# Patient Record
Sex: FEMALE | Race: WHITE | NOT HISPANIC OR LATINO | Employment: OTHER | URBAN - NONMETROPOLITAN AREA
[De-identification: names, ages, dates, MRNs, and addresses within clinical notes are randomized per-mention and may not be internally consistent; named-entity substitution may affect disease eponyms.]

---

## 2017-01-25 DIAGNOSIS — R07.2 PRECORDIAL PAIN: Primary | ICD-10-CM

## 2017-01-26 ENCOUNTER — APPOINTMENT (OUTPATIENT)
Dept: LAB | Facility: HOSPITAL | Age: 57
End: 2017-01-26
Attending: INTERNAL MEDICINE

## 2017-01-26 ENCOUNTER — OFFICE VISIT (OUTPATIENT)
Dept: CARDIOLOGY | Facility: CLINIC | Age: 57
End: 2017-01-26

## 2017-01-26 ENCOUNTER — HOSPITAL ENCOUNTER (OUTPATIENT)
Dept: GENERAL RADIOLOGY | Facility: HOSPITAL | Age: 57
Discharge: HOME | End: 2017-01-26
Attending: INTERNAL MEDICINE | Admitting: INTERNAL MEDICINE

## 2017-01-26 VITALS
HEIGHT: 62 IN | OXYGEN SATURATION: 96 % | HEART RATE: 104 BPM | WEIGHT: 158 LBS | DIASTOLIC BLOOD PRESSURE: 80 MMHG | BODY MASS INDEX: 29.08 KG/M2 | SYSTOLIC BLOOD PRESSURE: 120 MMHG

## 2017-01-26 DIAGNOSIS — I10 ESSENTIAL HYPERTENSION: ICD-10-CM

## 2017-01-26 DIAGNOSIS — R07.2 PRECORDIAL PAIN: Primary | ICD-10-CM

## 2017-01-26 DIAGNOSIS — F17.200 SMOKER: ICD-10-CM

## 2017-01-26 DIAGNOSIS — E78.2 MIXED HYPERLIPIDEMIA: ICD-10-CM

## 2017-01-26 DIAGNOSIS — R91.1 PULMONARY NODULE: ICD-10-CM

## 2017-01-26 DIAGNOSIS — R06.09 DYSPNEA ON EXERTION: ICD-10-CM

## 2017-01-26 LAB
ALBUMIN SERPL-MCNC: 5 G/DL (ref 3.4–4.8)
ALBUMIN UR-MCNC: <0.6 MG/L
ALP SERPL-CCNC: 154 U/L (ref 38–126)
ALT SERPL W P-5'-P-CCNC: 40 U/L (ref 9–52)
ARTICHOKE IGE QN: 83 MG/DL (ref 1–129)
AST SERPL-CCNC: 30 U/L (ref 14–36)
BILIRUB CONJ SERPL-MCNC: 0 MG/DL (ref 0–0.3)
BILIRUB INDIRECT SERPL-MCNC: 0 MG/DL (ref 0–1.1)
BILIRUB SERPL-MCNC: 0.4 MG/DL (ref 0.2–1.3)
CHOLEST SERPL-MCNC: 150 MG/DL (ref 0–199)
HDLC SERPL-MCNC: 31 MG/DL (ref 60–200)
LDLC/HDLC SERPL: 1.67 {RATIO} (ref 0–3.22)
NT-PROBNP SERPL-MCNC: 17.2 PG/ML (ref 0–900)
PROT SERPL-MCNC: 8.1 G/DL (ref 6.3–8.6)
TRIGL SERPL-MCNC: 336 MG/DL (ref 20–199)

## 2017-01-26 PROCEDURE — 93000 ELECTROCARDIOGRAM COMPLETE: CPT | Performed by: INTERNAL MEDICINE

## 2017-01-26 PROCEDURE — 99203 OFFICE O/P NEW LOW 30 MIN: CPT | Performed by: INTERNAL MEDICINE

## 2017-01-26 PROCEDURE — 80076 HEPATIC FUNCTION PANEL: CPT | Performed by: INTERNAL MEDICINE

## 2017-01-26 PROCEDURE — 71020 HC CHEST PA AND LATERAL: CPT

## 2017-01-26 PROCEDURE — 86141 C-REACTIVE PROTEIN HS: CPT | Performed by: INTERNAL MEDICINE

## 2017-01-26 PROCEDURE — 36415 COLL VENOUS BLD VENIPUNCTURE: CPT | Performed by: INTERNAL MEDICINE

## 2017-01-26 PROCEDURE — 80061 LIPID PANEL: CPT | Performed by: INTERNAL MEDICINE

## 2017-01-26 PROCEDURE — 82043 UR ALBUMIN QUANTITATIVE: CPT | Performed by: INTERNAL MEDICINE

## 2017-01-26 PROCEDURE — 83880 ASSAY OF NATRIURETIC PEPTIDE: CPT | Performed by: INTERNAL MEDICINE

## 2017-01-26 RX ORDER — LIDOCAINE 50 MG/G
1 PATCH TOPICAL EVERY 24 HOURS
COMMUNITY
End: 2017-07-26

## 2017-01-26 NOTE — MR AVS SNAPSHOT
[facility] CARDIOLOGY  [phone]                    Marguerite Rob   1/26/2017 10:00 AM   Office Visit    Dept Phone:  [phone]   Encounter #:  [id number]    Provider:  Lucas Puga MD PhD   Department:  [facility] CARDIOLOGY                Your Full Care Plan              Today's Medication Changes          These changes are accurate as of: 1/26/17 11:00 AM.  If you have any questions, ask your nurse or doctor.               Stop taking medication(s)listed here:     metroNIDAZOLE 500 MG tablet   Commonly known as:  FLAGYL   Stopped by:  Lucas Puga MD PhD                      Your Updated Medication List          This list is accurate as of: 1/26/17 11:00 AM.  Always use your most recent med list.                * albuterol 108 (90 BASE) MCG/ACT inhaler   Commonly known as:  PROVENTIL HFA;VENTOLIN HFA       * albuterol (5 MG/ML) 0.5% nebulizer solution   Commonly known as:  PROVENTIL       amitriptyline 100 MG tablet   Commonly known as:  ELAVIL       amLODIPine 5 MG tablet   Commonly known as:  NORVASC       budesonide-formoterol 160-4.5 MCG/ACT inhaler   Commonly known as:  SYMBICORT       CLARITIN 10 MG tablet   Generic drug:  loratadine       famotidine 40 MG tablet   Commonly known as:  PEPCID       fluconazole 100 MG tablet   Commonly known as:  DIFLUCAN       furosemide 40 MG tablet   Commonly known as:  LASIX       HYDROcodone-acetaminophen  MG per tablet   Commonly known as:  NORCO       JANUMET  MG per tablet   Generic drug:  sitaGLIPtin-metFORMIN       lidocaine 5 %   Commonly known as:  LIDODERM       LOPID 600 MG tablet   Generic drug:  gemfibrozil       pantoprazole 20 MG EC tablet   Commonly known as:  PROTONIX       potassium chloride 20 MEQ CR tablet   Commonly known as:  K-DUR,KLOR-CON       pravastatin 20 MG tablet   Commonly known as:  PRAVACHOL       sennosides-docusate sodium 8.6-50 MG tablet   Commonly known as:   SENOKOT-S       * Notice:  This list has 2 medication(s) that are the same as other medications prescribed for you. Read the directions carefully, and ask your doctor or other care provider to review them with you.            We Performed the Following     Adult Transthoracic Echo Complete     Ambulatory Referral to Pulmonology     BNP     BNP     Hepatic Function Panel     High Sensitivity CRP     Lipid Panel     MicroAlbumin, Urine, Random     Pulmonary Function Test     Stress Test With Myocardial Perfusion One Day     Walking Oximetry     XR Chest 2 View       You Were Diagnosed With        Codes Comments    Precordial pain    -  Primary ICD-10-CM: R07.2  ICD-9-CM: 786.51     Essential hypertension     ICD-10-CM: I10  ICD-9-CM: 401.9     Mixed hyperlipidemia     ICD-10-CM: E78.2  ICD-9-CM: 272.2     Dyspnea on exertion     ICD-10-CM: R06.09  ICD-9-CM: 786.09     Smoker     ICD-10-CM: F17.200  ICD-9-CM: 305.1     Pulmonary nodule     ICD-10-CM: R91.1  ICD-9-CM: 793.11       Instructions    Nonspecific Chest Pain   Chest pain can be caused by many different conditions. There is always a chance that your pain could be related to something serious, such as a heart attack or a blood clot in your lungs. Chest pain can also be caused by conditions that are not life-threatening. If you have chest pain, it is very important to follow up with your health care provider.  CAUSES   Chest pain can be caused by:  · Heartburn.  · Pneumonia or bronchitis.  · Anxiety or stress.  · Inflammation around your heart (pericarditis) or lung (pleuritis or pleurisy).  · A blood clot in your lung.  · A collapsed lung (pneumothorax). It can develop suddenly on its own (spontaneous pneumothorax) or from trauma to the chest.  · Shingles infection (varicella-zoster virus).  · Heart attack.  · Damage to the bones, muscles, and cartilage that make up your chest wall. This can include:    Bruised bones due to injury.    Strained muscles or  cartilage due to frequent or repeated coughing or overwork.    Fracture to one or more ribs.    Sore cartilage due to inflammation (costochondritis).  RISK FACTORS   Risk factors for chest pain may include:  · Activities that increase your risk for trauma or injury to your chest.  · Respiratory infections or conditions that cause frequent coughing.  · Medical conditions or overeating that can cause heartburn.  · Heart disease or family history of heart disease.  · Conditions or health behaviors that increase your risk of developing a blood clot.  · Having had chicken pox (varicella zoster).  SIGNS AND SYMPTOMS  Chest pain can feel like:  · Burning or tingling on the surface of your chest or deep in your chest.  · Crushing, pressure, aching, or squeezing pain.  · Dull or sharp pain that is worse when you move, cough, or take a deep breath.  · Pain that is also felt in your back, neck, shoulder, or arm, or pain that spreads to any of these areas.  Your chest pain may come and go, or it may stay constant.  DIAGNOSIS  Lab tests or other studies may be needed to find the cause of your pain. Your health care provider may have you take a test called an ambulatory ECG (electrocardiogram). An ECG records your heartbeat patterns at the time the test is performed. You may also have other tests, such as:  · Transthoracic echocardiogram (TTE). During echocardiography, sound waves are used to create a picture of all of the heart structures and to look at how blood flows through your heart.  · Transesophageal echocardiogram (RODNEY). This is a more advanced imaging test that obtains images from inside your body. It allows your health care provider to see your heart in finer detail.  · Cardiac monitoring. This allows your health care provider to monitor your heart rate and rhythm in real time.  · Holter monitor. This is a portable device that records your heartbeat and can help to diagnose abnormal heartbeats. It allows your health  care provider to track your heart activity for several days, if needed.  · Stress tests. These can be done through exercise or by taking medicine that makes your heart beat more quickly.  · Blood tests.  · Imaging tests.  TREATMENT   Your treatment depends on what is causing your chest pain. Treatment may include:  · Medicines. These may include:    Acid blockers for heartburn.    Anti-inflammatory medicine.    Pain medicine for inflammatory conditions.    Antibiotic medicine, if an infection is present.    Medicines to dissolve blood clots.    Medicines to treat coronary artery disease.  · Supportive care for conditions that do not require medicines. This may include:    Resting.    Applying heat or cold packs to injured areas.    Limiting activities until pain decreases.  HOME CARE INSTRUCTIONS  · If you were prescribed an antibiotic medicine, finish it all even if you start to feel better.  · Avoid any activities that bring on chest pain.  · Do not use any tobacco products, including cigarettes, chewing tobacco, or electronic cigarettes. If you need help quitting, ask your health care provider.  · Do not drink alcohol.  · Take medicines only as directed by your health care provider.  · Keep all follow-up visits as directed by your health care provider. This is important. This includes any further testing if your chest pain does not go away.  · If heartburn is the cause for your chest pain, you may be told to keep your head raised (elevated) while sleeping. This reduces the chance that acid will go from your stomach into your esophagus.  · Make lifestyle changes as directed by your health care provider. These may include:    Getting regular exercise. Ask your health care provider to suggest some activities that are safe for you.    Eating a heart-healthy diet. A registered dietitian can help you to learn healthy eating options.    Maintaining a healthy weight.    Managing diabetes, if necessary.    Reducing  stress.  SEEK MEDICAL CARE IF:  · Your chest pain does not go away after treatment.  · You have a rash with blisters on your chest.  · You have a fever.  SEEK IMMEDIATE MEDICAL CARE IF:   · Your chest pain is worse.  · You have an increasing cough, or you cough up blood.  · You have severe abdominal pain.  · You have severe weakness.  · You faint.  · You have chills.  · You have sudden, unexplained chest discomfort.  · You have sudden, unexplained discomfort in your arms, back, neck, or jaw.  · You have shortness of breath at any time.  · You suddenly start to sweat, or your skin gets clammy.  · You feel nauseous or you vomit.  · You suddenly feel light-headed or dizzy.  · Your heart begins to beat quickly, or it feels like it is skipping beats.  These symptoms may represent a serious problem that is an emergency. Do not wait to see if the symptoms will go away. Get medical help right away. Call your local emergency services (911 in the [place] Do not drive yourself to the hospital.     This information is not intended to replace advice given to you by your health care provider. Make sure you discuss any questions you have with your health care provider.     Document Released: 09/27/2006 Document Revised: 01/08/2016 Document Reviewed: 07/24/2015  [employer] Interactive Patient Education ©2016 [employer]       Patient Instructions History      Upcoming Appointments     Visit Type Date Time Department    NEW PATIENT 1/26/2017 10:00 AM [facility] CARDIOLOGY [facility] ECHO 2D COMPLETE VT 2/7/2017  2:00 PM [facility] CARDIOLOGY [facility]    OFFICE VISIT 3/3/2017  9:00 AM [facility] CARDIOLOGY [facility]    OFFICE VISIT 6/15/2017 10:30 AM [facility] GASTROENT  [facility]      MyChart Signup     Our records indicate that you have declined [employer] signup. If you would like to sign up for [employer], please email [email or web address] or call [phone] to obtain an activation code.             Other Info from Your Visit           Your  "Appointments     Feb 07, 2017  2:00 PM CST   ECHOCARDIOGRAM 2D COMPLETE VISIT with [facility] ECHO ROOM   [facility] CARDIOLOGY (--)    [address] [place] [address] [address]  [address] [place] [id number] [phone]           Bring your insurance cards with you. Wear comfortable clothing and shoes.            Mar 03, 2017  9:00 AM CST   Office Visit with GUILLE Lopez   [facility] CARDIOLOGY (--)    [address] [place] [address] [address]  [address] [place] [id number] [phone]           Arrive 15 minutes prior to appointment.            Rony 15, 2017 10:30 AM CDT   Office Visit with Genaro Franco PA-C   [facility] GASTROENTEROLOGY (--)    [address] [place] [address] [address]  [address] [place] [id number] [phone]           Arrive 15 minutes prior to appointment.              Allergies     Penicillins  Rash      Reason for Visit     Chest Pain           Vital Signs     Blood Pressure Pulse Height Weight Oxygen Saturation Body Mass Index    120/80 (BP Location: Right arm, Patient Position: Sitting, Cuff Size: Adult) 104 62\" (157.5 cm) 158 lb (71.7 kg) 96% 28.9 kg/m2    Smoking Status                   Current Every Day Smoker           Problems and Diagnoses Noted     Precordial chest pain    -  Primary    High blood pressure        Mixed hyperlipidemia        Breathlessness on exertion        Smoker        Pulmonary nodule            "

## 2017-01-26 NOTE — LETTER
"January 26, 2017     Genaro Franco PA-C  [address] [place] [address] [address]  [place] [id number]    Patient: Marguerite Rob   YOB: 1960   Date of Visit: 1/26/2017       Dear Dr. Salvador PA-C:    Thank you for referring Marguerite Rob to me for evaluation. Below are the relevant portions of my assessment and plan of care.    If you have questions, please do not hesitate to call me. I look forward to following Marguerite along with you.         Sincerely,        Lucas Puga MD PhD        CC: GUILLE Waters MD PhD  1/26/2017 10:46 AM  Sign at close encounter  Cardiovascular Medicine   Lucas Puga M.D., Ph.D., [facility]      Genaro Franco PA-C  [address] [place] [address] [address]  [place] [address]  Thank you for asking me to see Marguerite Rob for CP.    History of Present Illness  This is a 56 y.o. female with:  1. HTN  2. HLD  3. CP  4. Hepatitis C  5. Dyspnea  A. TTE 2013 normal PASP  6. Diastolic dysfunction    Chest Pain  Marguerite Rbo complains of chest pain. In December she was having non-exertional SSCP. This is centrally located. It is a \"pain\" sensation. There is no exertional component. It often happens while she is watching her grandchildren. She saw GI. She was diagnosed with gastritis. She is on treatment. Symptoms have improved.     Dyspnea  Marguerite Rob is a 56 y.o. female who presents for evaluation of shortness of breath. She is having mild dyspnea with exertion. She has had LE edema. No prior MI or CHF. She had a TTE in 2013 with diastolic dysfunction. She had a nuclear MPS with Dr. Sandoval that was low-risk in 2010. She has had abdominal ascites. She is apparently not a candidate for treatment given her insurance. She       HTN  Concerning the patient's hypertension, the patient is currently taking Norvasc.  The patient is medication compliant.  Denies side effects.      HLD  Concerning the patient's hyperlipidemia, the patient remains on a " statin.  They are tolerating this very well.  Denies side effects.  Specifically, the patient denies any prior history of asymptomatic LFT elevation, myositis or myalgias.  Patient's laboratory evaluations are followed closely by their PCP.      Review of Systems - History obtained from chart review and the patient  General ROS: negative  positive for  - fatigue  Respiratory ROS: positive for - shortness of breath  Cardiovascular ROS: positive for - chest pain    family history includes Breast cancer in her sister; Cancer in her father and other; Coronary artery disease in her mother; Endometrial cancer in her other; Ovarian cancer in her other.     reports that she has been smoking Cigarettes.  She has been smoking about 1.00 pack per day. She has never used smokeless tobacco. She reports that she does not drink alcohol or use illicit drugs.    Allergies   Allergen Reactions   • Penicillins Rash         Current Outpatient Prescriptions:   •  albuterol (PROVENTIL HFA;VENTOLIN HFA) 108 (90 BASE) MCG/ACT inhaler, Inhale 2 puffs Every 4 (Four) Hours As Needed for wheezing., Disp: , Rfl:   •  albuterol (PROVENTIL) (5 MG/ML) 0.5% nebulizer solution, Take 2.5 mg by nebulization Every 6 (Six) Hours As Needed for wheezing., Disp: , Rfl:   •  amitriptyline (ELAVIL) 100 MG tablet, Take 100 mg by mouth Every Night., Disp: , Rfl:   •  amLODIPine (NORVASC) 5 MG tablet, Take 5 mg by mouth Daily., Disp: , Rfl:   •  budesonide-formoterol (SYMBICORT) 160-4.5 MCG/ACT inhaler, Inhale 2 puffs 2 (Two) Times a Day., Disp: , Rfl:   •  famotidine (PEPCID) 40 MG tablet, Take 40 mg by mouth At Night As Needed for heartburn., Disp: , Rfl:   •  fluconazole (DIFLUCAN) 100 MG tablet, Take 100 mg by mouth 1 (One) Time Per Week., Disp: , Rfl:   •  furosemide (LASIX) 40 MG tablet, Take 40 mg by mouth Daily., Disp: , Rfl:   •  gemfibrozil (LOPID) 600 MG tablet, Take 600 mg by mouth 2 (Two) Times a Day Before Meals., Disp: , Rfl:   •   HYDROcodone-acetaminophen (NORCO)  MG per tablet, Take 1 tablet by mouth 3 (Three) Times a Day., Disp: , Rfl:   •  lidocaine (LIDODERM) 5 %, Place 1 patch on the skin Daily. Remove & Discard patch within 12 hours or as directed by MD, Disp: , Rfl:   •  loratadine (CLARITIN) 10 MG tablet, Take 10 mg by mouth Daily., Disp: , Rfl:   •  pantoprazole (PROTONIX) 20 MG EC tablet, Take 20 mg by mouth Daily., Disp: , Rfl:   •  potassium chloride (K-DUR,KLOR-CON) 20 MEQ CR tablet, Take 20 mEq by mouth 2 (Two) Times a Day., Disp: , Rfl:   •  pravastatin (PRAVACHOL) 20 MG tablet, Take 20 mg by mouth Every Night., Disp: , Rfl:   •  sennosides-docusate sodium (SENOKOT-S) 8.6-50 MG tablet, Take 2 tablets by mouth Every Night., Disp: , Rfl:   •  sitaGLIPtin-metFORMIN (JANUMET)  MG per tablet, Take 1 tablet by mouth 2 (Two) Times a Day With Meals., Disp: , Rfl:     Physical Exam:  Vitals:    01/26/17 1006   BP: 120/80   Pulse:    SpO2:      Body mass index is 28.9 kg/(m^2).    GEN: alert, appears stated age and cooperative  Body Habitus: well-nourished  Neuro: CN II-XII grossly intact.   HEENT: Head: Normocephalic, no lesions, without obvious abnormality.  Neck / Thyroid: Supple, no masses, nodes, nodules or enlargement. No arcus senilis, xanthelasma or xanthomas. PERRL. Normal external ears. No drainage. No thyromegaly. Neck supple. No LAD. Trachea midline. Nose, normal.  JVP: 6 cm of water at 45 degrees HJR: absent      Carotid:  Upstroke: easily palpated bilaterally Volume: Normal.    Carotid Bruit:  None  Subclavian Bruit: Not present.    Lymph: No overt LAD.   Back: Normal.  Chest:  Normal Excursion: Good    I:E: Good  Pulmonary:clear to auscultation, no wheezes, rales or rhonchi, symmetric air entry. Equal chest excursion. Chest physical exam is normal. No tenderness.        Precordium:  No palpable heaves or thrusts. P2 is not palpable.   [place]:  normal size and placement Palpable S4: Not present.   Heart rate:  normal  Heart Rhythm: regular     Heart Sounds: S1: normal intensity  S2: normal intensity  S3: absent   S4: absent  Opening Snap: absent  A2-OS:  N/A  Pericardial rub: absent    Ejection click: None      Murmurs: Systolic: none  Diastolic: none  Abdomen: Soft, non-tender, normal bowel sounds; no bruits, organomegaly or masses.  Extremity: no edema, cyanosis  Pulses: Right radial artery has 2+ (normal) pulse and Left radial artery has 2+ (normal) pulse    DATA REVIEWED:   CT scan 2013, pulmonary nodule without follow-up    EKG: Dated and interpreted today.  Sinus tachycardia with a ventricular rate of 104.  Incomplete right bundle branch block.      Assessment/Plan      1. Chest pain syndrome. The pain complaints are atypical. I do not think this is cardiac in nature, but the patient is Moderate Risk.  Ischemic evaluation:requested.  The patient is not able to exercise secondary to dyspnea. EKG is Abnormal:.   -Risks/Benefits discussed.   -A hand out was provided on the type of stress test. Questions answered.   -I have asked the patient to call 911 or be seen in the ED for further CP complaints.   -Will plan on an ischemia evaluation with Gnammo MPS.  -TTE, Basic Labs, PA/LA CXR    2. Dyspnea. Etiology is likely pulmonary. There  Are no signs of HF on examination. The patient does have risk factors for HF. Functional class is: Class 3 - comfortable at rest but have symptoms with less-than-ordinary effort. She will need to be screened for PH and portal HTN given her chronic hepatitis C.   -Will obtain full PFTs, 6MWT, TTE  -She likely will need an TERRANCE evaluation as her screening was TERRANCE was abnormal.   -Ischemic eval  -Referral to Dr. Marcano for pulmonary nodule follow-up    3. HTN, essential.  HD BP noted to be well controlled today in office.    End-organ damage: None known.  DASH; medication compliance; Low sodium diet  TTE for LVH assessment  Microalbumin, CBC, BMP, EKG   Continue current treatment  regimen.    4. Dyslipidemia.  Under unknown control.  -Statin:  Yes.  -Recommended moderate-intensity statin therapy  -Lipid-lowering medications: Pravastatin   -Smoking cessation    5. Cardiac Risk Assessment: Unknown. Will check basic labs.    6. Tobacco status: current smoker.  Details of the CV risks of smoking were provided in a hand out. Benefits of cessation was covered.   I advised patient to quit, and offered support.  Educational material distributed.  3-10 mintues      Plan for follow-up: in 4-6 weeks      EMR Dragon/Transcription disclaimer:     Much of this encounter note is an electronic transcription/translation of spoken language to printed text. The electronic translation of spoken language may permit erroneous, or at times, nonsensical words or phrases to be inadvertently transcribed; Although I have reviewed the note for such errors, some may still exist.

## 2017-01-26 NOTE — PROGRESS NOTES
"Cardiovascular Medicine   Lucas Puga M.D., Ph.D., [facility]      Genaro Franco PA-C  [address] [place] [address] [address]  [place], [place] [address]  Thank you for asking me to see Marguerite Rob for CP.    History of Present Illness  This is a 56 y.o. female with:  1. HTN  2. HLD  3. CP  4. Hepatitis C  5. Dyspnea  A. TTE 2013 normal PASP  6. Diastolic dysfunction    Chest Pain  Marguertie Rob complains of chest pain. In December she was having non-exertional SSCP. This is centrally located. It is a \"pain\" sensation. There is no exertional component. It often happens while she is watching her grandchildren. She saw GI. She was diagnosed with gastritis. She is on treatment. Symptoms have improved.     Dyspnea  Marguerite Rob is a 56 y.o. female who presents for evaluation of shortness of breath. She is having mild dyspnea with exertion. She has had LE edema. No prior MI or CHF. She had a TTE in 2013 with diastolic dysfunction. She had a nuclear MPS with Dr. Sandoval that was low-risk in 2010. She has had abdominal ascites. She is apparently not a candidate for treatment given her insurance. She       HTN  Concerning the patient's hypertension, the patient is currently taking Norvasc.  The patient is medication compliant.  Denies side effects.      HLD  Concerning the patient's hyperlipidemia, the patient remains on a statin.  They are tolerating this very well.  Denies side effects.  Specifically, the patient denies any prior history of asymptomatic LFT elevation, myositis or myalgias.  Patient's laboratory evaluations are followed closely by their PCP.      Review of Systems - History obtained from chart review and the patient  General ROS: negative  positive for  - fatigue  Respiratory ROS: positive for - shortness of breath  Cardiovascular ROS: positive for - chest pain    family history includes Breast cancer in her sister; Cancer in her father and other; Coronary artery disease in her mother; Endometrial cancer in " her other; Ovarian cancer in her other.     reports that she has been smoking Cigarettes.  She has been smoking about 1.00 pack per day. She has never used smokeless tobacco. She reports that she does not drink alcohol or use illicit drugs.    Allergies   Allergen Reactions   • Penicillins Rash         Current Outpatient Prescriptions:   •  albuterol (PROVENTIL HFA;VENTOLIN HFA) 108 (90 BASE) MCG/ACT inhaler, Inhale 2 puffs Every 4 (Four) Hours As Needed for wheezing., Disp: , Rfl:   •  albuterol (PROVENTIL) (5 MG/ML) 0.5% nebulizer solution, Take 2.5 mg by nebulization Every 6 (Six) Hours As Needed for wheezing., Disp: , Rfl:   •  amitriptyline (ELAVIL) 100 MG tablet, Take 100 mg by mouth Every Night., Disp: , Rfl:   •  amLODIPine (NORVASC) 5 MG tablet, Take 5 mg by mouth Daily., Disp: , Rfl:   •  budesonide-formoterol (SYMBICORT) 160-4.5 MCG/ACT inhaler, Inhale 2 puffs 2 (Two) Times a Day., Disp: , Rfl:   •  famotidine (PEPCID) 40 MG tablet, Take 40 mg by mouth At Night As Needed for heartburn., Disp: , Rfl:   •  fluconazole (DIFLUCAN) 100 MG tablet, Take 100 mg by mouth 1 (One) Time Per Week., Disp: , Rfl:   •  furosemide (LASIX) 40 MG tablet, Take 40 mg by mouth Daily., Disp: , Rfl:   •  gemfibrozil (LOPID) 600 MG tablet, Take 600 mg by mouth 2 (Two) Times a Day Before Meals., Disp: , Rfl:   •  HYDROcodone-acetaminophen (NORCO)  MG per tablet, Take 1 tablet by mouth 3 (Three) Times a Day., Disp: , Rfl:   •  lidocaine (LIDODERM) 5 %, Place 1 patch on the skin Daily. Remove & Discard patch within 12 hours or as directed by MD, Disp: , Rfl:   •  loratadine (CLARITIN) 10 MG tablet, Take 10 mg by mouth Daily., Disp: , Rfl:   •  pantoprazole (PROTONIX) 20 MG EC tablet, Take 20 mg by mouth Daily., Disp: , Rfl:   •  potassium chloride (K-DUR,KLOR-CON) 20 MEQ CR tablet, Take 20 mEq by mouth 2 (Two) Times a Day., Disp: , Rfl:   •  pravastatin (PRAVACHOL) 20 MG tablet, Take 20 mg by mouth Every Night., Disp: , Rfl:    •  sennosides-docusate sodium (SENOKOT-S) 8.6-50 MG tablet, Take 2 tablets by mouth Every Night., Disp: , Rfl:   •  sitaGLIPtin-metFORMIN (JANUMET)  MG per tablet, Take 1 tablet by mouth 2 (Two) Times a Day With Meals., Disp: , Rfl:     Physical Exam:  Vitals:    01/26/17 1006   BP: 120/80   Pulse:    SpO2:      Body mass index is 28.9 kg/(m^2).    GEN: alert, appears stated age and cooperative  Body Habitus: well-nourished  Neuro: CN II-XII grossly intact.   HEENT: Head: Normocephalic, no lesions, without obvious abnormality.  Neck / Thyroid: Supple, no masses, nodes, nodules or enlargement. No arcus senilis, xanthelasma or xanthomas. PERRL. Normal external ears. No drainage. No thyromegaly. Neck supple. No LAD. Trachea midline. Nose, normal.  JVP: 6 cm of water at 45 degrees HJR: absent      Carotid:  Upstroke: easily palpated bilaterally Volume: Normal.    Carotid Bruit:  None  Subclavian Bruit: Not present.    Lymph: No overt LAD.   Back: Normal.  Chest:  Normal Excursion: Good    I:E: Good  Pulmonary:clear to auscultation, no wheezes, rales or rhonchi, symmetric air entry. Equal chest excursion. Chest physical exam is normal. No tenderness.        Precordium:  No palpable heaves or thrusts. P2 is not palpable.   Samson:  normal size and placement Palpable S4: Not present.   Heart rate: normal  Heart Rhythm: regular     Heart Sounds: S1: normal intensity  S2: normal intensity  S3: absent   S4: absent  Opening Snap: absent  A2-OS:  N/A  Pericardial rub: absent    Ejection click: None      Murmurs: Systolic: none  Diastolic: none  Abdomen: Soft, non-tender, normal bowel sounds; no bruits, organomegaly or masses.  Extremity: no edema, cyanosis  Pulses: Right radial artery has 2+ (normal) pulse and Left radial artery has 2+ (normal) pulse    DATA REVIEWED:   CT scan 2013, pulmonary nodule without follow-up    EKG: Dated and interpreted today.  Sinus tachycardia with a ventricular rate of 104.  Incomplete  right bundle branch block.      Assessment/Plan      1. Chest pain syndrome. The pain complaints are atypical. I do not think this is cardiac in nature, but the patient is Moderate Risk.  Ischemic evaluation:requested.  The patient is not able to exercise secondary to dyspnea. EKG is Abnormal:.   -Risks/Benefits discussed.   -A hand out was provided on the type of stress test. Questions answered.   -I have asked the patient to call 911 or be seen in the ED for further CP complaints.   -Will plan on an ischemia evaluation with Lexiscan MPS.  -TTE, Basic Labs, PA/LA CXR    2. Dyspnea. Etiology is likely pulmonary. There  Are no signs of HF on examination. The patient does have risk factors for HF. Functional class is: Class 3 - comfortable at rest but have symptoms with less-than-ordinary effort. She will need to be screened for PH and portal HTN given her chronic hepatitis C.   -Will obtain full PFTs, 6MWT, TTE  -She likely will need an TERRANCE evaluation as her screening was TERRANCE was abnormal.   -Ischemic eval  -Referral to Dr. Marcano for pulmonary nodule follow-up    3. HTN, essential.  HD BP noted to be well controlled today in office.    End-organ damage: None known.  DASH; medication compliance; Low sodium diet  TTE for LVH assessment  Microalbumin, CBC, BMP, EKG   Continue current treatment regimen.    4. Dyslipidemia.  Under unknown control.  -Statin:  Yes.  -Recommended moderate-intensity statin therapy  -Lipid-lowering medications: Pravastatin   -Smoking cessation    5. Cardiac Risk Assessment: Unknown. Will check basic labs.    6. Tobacco status: current smoker.  Details of the CV risks of smoking were provided in a hand out. Benefits of cessation was covered.   I advised patient to quit, and offered support.  Educational material distributed.  3-10 mintues      Plan for follow-up: in 4-6 weeks      EMR Dragon/Transcription disclaimer:     Much of this encounter note is an electronic transcription/translation of  spoken language to printed text. The electronic translation of spoken language may permit erroneous, or at times, nonsensical words or phrases to be inadvertently transcribed; Although I have reviewed the note for such errors, some may still exist.

## 2017-01-26 NOTE — PATIENT INSTRUCTIONS
Nonspecific Chest Pain   Chest pain can be caused by many different conditions. There is always a chance that your pain could be related to something serious, such as a heart attack or a blood clot in your lungs. Chest pain can also be caused by conditions that are not life-threatening. If you have chest pain, it is very important to follow up with your health care provider.  CAUSES   Chest pain can be caused by:  · Heartburn.  · Pneumonia or bronchitis.  · Anxiety or stress.  · Inflammation around your heart (pericarditis) or lung (pleuritis or pleurisy).  · A blood clot in your lung.  · A collapsed lung (pneumothorax). It can develop suddenly on its own (spontaneous pneumothorax) or from trauma to the chest.  · Shingles infection (varicella-zoster virus).  · Heart attack.  · Damage to the bones, muscles, and cartilage that make up your chest wall. This can include:    Bruised bones due to injury.    Strained muscles or cartilage due to frequent or repeated coughing or overwork.    Fracture to one or more ribs.    Sore cartilage due to inflammation (costochondritis).  RISK FACTORS   Risk factors for chest pain may include:  · Activities that increase your risk for trauma or injury to your chest.  · Respiratory infections or conditions that cause frequent coughing.  · Medical conditions or overeating that can cause heartburn.  · Heart disease or family history of heart disease.  · Conditions or health behaviors that increase your risk of developing a blood clot.  · Having had chicken pox (varicella zoster).  SIGNS AND SYMPTOMS  Chest pain can feel like:  · Burning or tingling on the surface of your chest or deep in your chest.  · Crushing, pressure, aching, or squeezing pain.  · Dull or sharp pain that is worse when you move, cough, or take a deep breath.  · Pain that is also felt in your back, neck, shoulder, or arm, or pain that spreads to any of these areas.  Your chest pain may come and go, or it may stay  constant.  DIAGNOSIS  Lab tests or other studies may be needed to find the cause of your pain. Your health care provider may have you take a test called an ambulatory ECG (electrocardiogram). An ECG records your heartbeat patterns at the time the test is performed. You may also have other tests, such as:  · Transthoracic echocardiogram (TTE). During echocardiography, sound waves are used to create a picture of all of the heart structures and to look at how blood flows through your heart.  · Transesophageal echocardiogram (RODNEY). This is a more advanced imaging test that obtains images from inside your body. It allows your health care provider to see your heart in finer detail.  · Cardiac monitoring. This allows your health care provider to monitor your heart rate and rhythm in real time.  · Holter monitor. This is a portable device that records your heartbeat and can help to diagnose abnormal heartbeats. It allows your health care provider to track your heart activity for several days, if needed.  · Stress tests. These can be done through exercise or by taking medicine that makes your heart beat more quickly.  · Blood tests.  · Imaging tests.  TREATMENT   Your treatment depends on what is causing your chest pain. Treatment may include:  · Medicines. These may include:    Acid blockers for heartburn.    Anti-inflammatory medicine.    Pain medicine for inflammatory conditions.    Antibiotic medicine, if an infection is present.    Medicines to dissolve blood clots.    Medicines to treat coronary artery disease.  · Supportive care for conditions that do not require medicines. This may include:    Resting.    Applying heat or cold packs to injured areas.    Limiting activities until pain decreases.  HOME CARE INSTRUCTIONS  · If you were prescribed an antibiotic medicine, finish it all even if you start to feel better.  · Avoid any activities that bring on chest pain.  · Do not use any tobacco products, including  cigarettes, chewing tobacco, or electronic cigarettes. If you need help quitting, ask your health care provider.  · Do not drink alcohol.  · Take medicines only as directed by your health care provider.  · Keep all follow-up visits as directed by your health care provider. This is important. This includes any further testing if your chest pain does not go away.  · If heartburn is the cause for your chest pain, you may be told to keep your head raised (elevated) while sleeping. This reduces the chance that acid will go from your stomach into your esophagus.  · Make lifestyle changes as directed by your health care provider. These may include:    Getting regular exercise. Ask your health care provider to suggest some activities that are safe for you.    Eating a heart-healthy diet. A registered dietitian can help you to learn healthy eating options.    Maintaining a healthy weight.    Managing diabetes, if necessary.    Reducing stress.  SEEK MEDICAL CARE IF:  · Your chest pain does not go away after treatment.  · You have a rash with blisters on your chest.  · You have a fever.  SEEK IMMEDIATE MEDICAL CARE IF:   · Your chest pain is worse.  · You have an increasing cough, or you cough up blood.  · You have severe abdominal pain.  · You have severe weakness.  · You faint.  · You have chills.  · You have sudden, unexplained chest discomfort.  · You have sudden, unexplained discomfort in your arms, back, neck, or jaw.  · You have shortness of breath at any time.  · You suddenly start to sweat, or your skin gets clammy.  · You feel nauseous or you vomit.  · You suddenly feel light-headed or dizzy.  · Your heart begins to beat quickly, or it feels like it is skipping beats.  These symptoms may represent a serious problem that is an emergency. Do not wait to see if the symptoms will go away. Get medical help right away. Call your local emergency services (911 in the [place] Do not drive yourself to the hospital.     This  information is not intended to replace advice given to you by your health care provider. Make sure you discuss any questions you have with your health care provider.     Document Released: 09/27/2006 Document Revised: 01/08/2016 Document Reviewed: 07/24/2015  [employer] Interactive Patient Education ©2016 [employer]

## 2017-01-27 LAB — CRP SERPL HS-MCNC: 1.75 MG/L (ref 0–3)

## 2017-01-31 ENCOUNTER — HOSPITAL ENCOUNTER (OUTPATIENT)
Dept: NUCLEAR MEDICINE | Facility: HOSPITAL | Age: 57
Discharge: HOME | End: 2017-01-31
Attending: INTERNAL MEDICINE

## 2017-01-31 ENCOUNTER — APPOINTMENT (OUTPATIENT)
Dept: NUCLEAR MEDICINE | Facility: HOSPITAL | Age: 57
End: 2017-01-31
Attending: INTERNAL MEDICINE

## 2017-01-31 ENCOUNTER — APPOINTMENT (OUTPATIENT)
Dept: CARDIOLOGY | Facility: HOSPITAL | Age: 57
End: 2017-01-31
Attending: INTERNAL MEDICINE

## 2017-01-31 PROCEDURE — 93016 CV STRESS TEST SUPVJ ONLY: CPT | Performed by: INTERNAL MEDICINE

## 2017-01-31 PROCEDURE — 78452 HT MUSCLE IMAGE SPECT MULT: CPT | Performed by: INTERNAL MEDICINE

## 2017-01-31 PROCEDURE — A9500 TC99M SESTAMIBI: HCPCS | Performed by: INTERNAL MEDICINE

## 2017-01-31 PROCEDURE — 93018 CV STRESS TEST I&R ONLY: CPT | Performed by: INTERNAL MEDICINE

## 2017-01-31 PROCEDURE — 0 TECHNETIUM SESTAMIBI: Performed by: INTERNAL MEDICINE

## 2017-01-31 RX ADMIN — Medication 1 DOSE: at 08:07

## 2017-02-02 ENCOUNTER — HOSPITAL ENCOUNTER (OUTPATIENT)
Dept: NUCLEAR MEDICINE | Facility: HOSPITAL | Age: 57
Discharge: HOME | End: 2017-02-02
Attending: INTERNAL MEDICINE

## 2017-02-02 ENCOUNTER — HOSPITAL ENCOUNTER (OUTPATIENT)
Dept: CARDIOLOGY | Facility: HOSPITAL | Age: 57
Discharge: HOME | End: 2017-02-02
Attending: INTERNAL MEDICINE

## 2017-02-02 VITALS
DIASTOLIC BLOOD PRESSURE: 74 MMHG | WEIGHT: 160 LBS | OXYGEN SATURATION: 98 % | HEART RATE: 93 BPM | SYSTOLIC BLOOD PRESSURE: 116 MMHG | HEIGHT: 62 IN | BODY MASS INDEX: 29.44 KG/M2

## 2017-02-02 PROCEDURE — A9500 TC99M SESTAMIBI: HCPCS | Performed by: INTERNAL MEDICINE

## 2017-02-02 PROCEDURE — 25010000002 REGADENOSON 0.4 MG/5ML SOLUTION: Performed by: INTERNAL MEDICINE

## 2017-02-02 PROCEDURE — 78452 HT MUSCLE IMAGE SPECT MULT: CPT

## 2017-02-02 PROCEDURE — 0 TECHNETIUM SESTAMIBI: Performed by: INTERNAL MEDICINE

## 2017-02-02 PROCEDURE — 93017 CV STRESS TEST TRACING ONLY: CPT

## 2017-02-02 RX ORDER — 0.9 % SODIUM CHLORIDE 0.9 %
10 VIAL (ML) INJECTION EVERY 12 HOURS SCHEDULED
Status: DISCONTINUED | OUTPATIENT
Start: 2017-02-02 | End: 2017-02-03 | Stop reason: HOSPADM

## 2017-02-02 RX ADMIN — SODIUM CHLORIDE 10 ML: 9 INJECTION INTRAMUSCULAR; INTRAVENOUS; SUBCUTANEOUS at 08:14

## 2017-02-02 RX ADMIN — REGADENOSON 0.4 MG: 0.08 INJECTION, SOLUTION INTRAVENOUS at 08:14

## 2017-02-02 RX ADMIN — Medication 1 DOSE: at 08:14

## 2017-02-03 LAB
BH CV NUCLEAR PRIOR STUDY: 2
BH CV STRESS COMMENTS STAGE 1: NORMAL
BH CV STRESS DOSE REGADENOSON STAGE 1: 0.4
BH CV STRESS DURATION MIN STAGE 1: 0
BH CV STRESS DURATION SEC STAGE 1: 10
BH CV STRESS PROTOCOL 1: NORMAL
BH CV STRESS RECOVERY BP: NORMAL MMHG
BH CV STRESS RECOVERY HR: 108 BPM
BH CV STRESS STAGE 1: 1
LV EF NUC BP: 63 %
MAXIMAL PREDICTED HEART RATE: 163 BPM
PERCENT MAX PREDICTED HR: 68.71 %
STRESS BASELINE BP: NORMAL MMHG
STRESS BASELINE HR: 93 BPM
STRESS PERCENT HR: 81 %
STRESS POST ESTIMATED WORKLOAD: 1 METS
STRESS POST PEAK BP: NORMAL MMHG
STRESS POST PEAK HR: 112 BPM
STRESS TARGET HR: 139 BPM

## 2017-02-07 ENCOUNTER — PROCEDURE VISIT (OUTPATIENT)
Dept: CARDIOLOGY | Facility: CLINIC | Age: 57
End: 2017-02-07

## 2017-02-07 DIAGNOSIS — R06.09 DYSPNEA ON EXERTION: ICD-10-CM

## 2017-02-07 DIAGNOSIS — R06.09 DYSPNEA ON EXERTION: Primary | ICD-10-CM

## 2017-02-07 PROCEDURE — 94620 PR PULMONARY STRESS TESTING,SIMPLE: CPT | Performed by: INTERNAL MEDICINE

## 2017-02-07 NOTE — PROGRESS NOTES
Marguerite Luz Maria Rob  Procedure: 6 Minute Walk Test   2/7/17  Indication:dyspnea    Pretest: BP:120/82               HR:118               Sa02:97               Dyspnea:3               Fatigue:5    Post Test: BP:120/72               HR:129               Sa02:95               Dyspnea:2               Fatigue:4    First 6MWT:yes    Supplemental oxygen during test:no    Stopped before 6 minutes:no    Pauses:none    Results in distance walked:360.46 m    Did individual experience any pain or discomfort:no    I was present for the test.  Agree with hemodynamic data.  NYHA functional class II.    Lucas Puga MD PhD

## 2017-02-09 ENCOUNTER — OFFICE VISIT (OUTPATIENT)
Dept: PULMONOLOGY | Facility: CLINIC | Age: 57
End: 2017-02-09

## 2017-02-09 VITALS
SYSTOLIC BLOOD PRESSURE: 118 MMHG | BODY MASS INDEX: 29.44 KG/M2 | WEIGHT: 160 LBS | OXYGEN SATURATION: 98 % | HEART RATE: 106 BPM | DIASTOLIC BLOOD PRESSURE: 70 MMHG | HEIGHT: 62 IN

## 2017-02-09 DIAGNOSIS — J44.9 CHRONIC OBSTRUCTIVE PULMONARY DISEASE, UNSPECIFIED COPD TYPE (HCC): Primary | ICD-10-CM

## 2017-02-09 DIAGNOSIS — J06.9 UPPER RESPIRATORY TRACT INFECTION, UNSPECIFIED TYPE: ICD-10-CM

## 2017-02-09 DIAGNOSIS — R91.1 LUNG NODULE: ICD-10-CM

## 2017-02-09 DIAGNOSIS — R06.09 DYSPNEA ON EXERTION: ICD-10-CM

## 2017-02-09 DIAGNOSIS — F17.200 TOBACCO DEPENDENCE SYNDROME: ICD-10-CM

## 2017-02-09 PROBLEM — I50.9 CHF (CONGESTIVE HEART FAILURE) (HCC): Status: ACTIVE | Noted: 2017-02-09

## 2017-02-09 PROCEDURE — 94060 EVALUATION OF WHEEZING: CPT | Performed by: INTERNAL MEDICINE

## 2017-02-09 PROCEDURE — 99204 OFFICE O/P NEW MOD 45 MIN: CPT | Performed by: INTERNAL MEDICINE

## 2017-02-09 PROCEDURE — 94727 GAS DIL/WSHOT DETER LNG VOL: CPT | Performed by: INTERNAL MEDICINE

## 2017-02-09 PROCEDURE — 94729 DIFFUSING CAPACITY: CPT | Performed by: INTERNAL MEDICINE

## 2017-02-09 PROCEDURE — 99406 BEHAV CHNG SMOKING 3-10 MIN: CPT | Performed by: INTERNAL MEDICINE

## 2017-02-09 RX ORDER — AZITHROMYCIN 250 MG/1
TABLET, FILM COATED ORAL
COMMUNITY
Start: 2017-02-06 | End: 2017-02-23

## 2017-02-09 RX ORDER — NICOTINE 21 MG/24HR
1 PATCH, TRANSDERMAL 24 HOURS TRANSDERMAL EVERY 24 HOURS
Qty: 28 PATCH | Refills: 11 | Status: SHIPPED | OUTPATIENT
Start: 2017-02-09 | End: 2017-07-26

## 2017-02-09 RX ORDER — SITAGLIPTIN AND METFORMIN HYDROCHLORIDE 1000; 50 MG/1; MG/1
1 TABLET, FILM COATED, EXTENDED RELEASE ORAL 2 TIMES DAILY
COMMUNITY
Start: 2017-02-02

## 2017-02-09 NOTE — PROGRESS NOTES
Pulmonary Consultation    Subjective     Chief Complaint   Patient presents with   • Shortness of Breath     Pulm.nodule, ref by Dr Vazquez        History of Present Illness  Marguerite Rob is a 56 y.o. female with a PMH significant for COPD/ asthma, tobacco use, and CHF who presents for evaluation of dyspnea and pulmonary nodule. Pt reports she was seen by Dr. Puga for cardiac evaluation and he noted a lung nodule in 2013. She says she was exposed to black mold several years ago and she was seen by a specialist in [place] who noted a spot on her left lung. Pt cannot recall having a CT scan. She did have what sounds like spirometry done at that time. Pt was told it was due to smoking but no further work-up was planned. She continues to smoke 1ppd for the past 42yrs. Pt has tried to quit cold turkey, but she finds it very hard to quit. She does want to quit but she feels stress and the habit cause her to continue. Pt has had a URI recently for which she is taking a z-pack. She does report being diagnosed with COPD sometime ago. Pt is taking Symbicort BID as well as albuterol nightly. She does feel the inhaler help. Pt admits when she was able to quit briefly she felt much better. She also endorses a chronic cough and wheezing when she lies down at night. Pt does admit to chest soreness, but denies edema. She does have issues with bloating/ gas and has been diagnosed with gastritis for which she takes protonix and pepcid.     Review of Systems: History obtained from chart review and the patient.  Review of Systems   HENT: Positive for congestion, postnasal drip, rhinorrhea and voice change.    Respiratory: Positive for cough, shortness of breath and wheezing.    Cardiovascular: Positive for chest pain and leg swelling.   Gastrointestinal: Positive for abdominal distention and abdominal pain.   Genitourinary: Positive for frequency.   Musculoskeletal: Positive for arthralgias and back pain.    Psychiatric/Behavioral: Positive for dysphoric mood.     As described in the HPI. Otherwise, remainder of ROS (14 systems) were negative.    Patient Active Problem List   Diagnosis   • COPD (chronic obstructive pulmonary disease)   • Tobacco dependence syndrome   • Lung nodule         Current Outpatient Prescriptions:   •  albuterol (PROVENTIL HFA;VENTOLIN HFA) 108 (90 BASE) MCG/ACT inhaler, Inhale 2 puffs Every 4 (Four) Hours As Needed for wheezing., Disp: , Rfl:   •  albuterol (PROVENTIL) (5 MG/ML) 0.5% nebulizer solution, Take 2.5 mg by nebulization Every 6 (Six) Hours As Needed for wheezing., Disp: , Rfl:   •  amitriptyline (ELAVIL) 100 MG tablet, Take 100 mg by mouth Every Night., Disp: , Rfl:   •  amLODIPine (NORVASC) 5 MG tablet, Take 5 mg by mouth Daily., Disp: , Rfl:   •  azithromycin (ZITHROMAX) 250 MG tablet, , Disp: , Rfl:   •  budesonide-formoterol (SYMBICORT) 160-4.5 MCG/ACT inhaler, Inhale 2 puffs 2 (Two) Times a Day., Disp: , Rfl:   •  famotidine (PEPCID) 40 MG tablet, Take 40 mg by mouth At Night As Needed for heartburn., Disp: , Rfl:   •  fluconazole (DIFLUCAN) 100 MG tablet, Take 100 mg by mouth 1 (One) Time Per Week., Disp: , Rfl:   •  furosemide (LASIX) 40 MG tablet, Take 40 mg by mouth Daily., Disp: , Rfl:   •  gemfibrozil (LOPID) 600 MG tablet, Take 600 mg by mouth 2 (Two) Times a Day Before Meals., Disp: , Rfl:   •  HYDROcodone-acetaminophen (NORCO)  MG per tablet, Take 1 tablet by mouth 3 (Three) Times a Day., Disp: , Rfl:   •  JANUMET XR  MG tablet sustained-release 24 hour, , Disp: , Rfl:   •  lidocaine (LIDODERM) 5 %, Place 1 patch on the skin Daily. Remove & Discard patch within 12 hours or as directed by MD, Disp: , Rfl:   •  loratadine (CLARITIN) 10 MG tablet, Take 10 mg by mouth Daily., Disp: , Rfl:   •  pantoprazole (PROTONIX) 20 MG EC tablet, Take 20 mg by mouth Daily., Disp: , Rfl:   •  potassium chloride (K-DUR,KLOR-CON) 20 MEQ CR tablet, Take 20 mEq by mouth 2  (Two) Times a Day., Disp: , Rfl:   •  pravastatin (PRAVACHOL) 20 MG tablet, Take 20 mg by mouth Every Night., Disp: , Rfl:   •  sennosides-docusate sodium (SENOKOT-S) 8.6-50 MG tablet, Take 2 tablets by mouth Every Night., Disp: , Rfl:   •  sitaGLIPtin-metFORMIN (JANUMET)  MG per tablet, Take 1 tablet by mouth 2 (Two) Times a Day With Meals., Disp: , Rfl:   •  nicotine (EQ NICOTINE) 21 MG/24HR patch, Place 1 patch on the skin Daily., Disp: 28 patch, Rfl: 11    Past Medical History   Diagnosis Date   • Alcoholic fatty liver    • Alkaline phosphatase raised    • Anxiety    • Asthma      IgE-MEDIATED ALLERGIC ASTHMA   • Backache      CHRONIC   • CHF (congestive heart failure)    • Chronic hepatitis C      2b. Fibrosure .05/F0, necroinflam .14/A0. Repeat .05/F0, .13/A0      • Chronic neck pain    • Constipation    • COPD (chronic obstructive pulmonary disease)    • Diarrhea    • Elevated levels of transaminase & lactic acid dehydrogenase    • Generalized abdominal pain    • GERD (gastroesophageal reflux disease)      WITH ESOPHAGITIS   • High risk sexual behavior    • Hypertension    • Hypokalemia    • Irritable bowel syndrome (IBS)    • Multiple joint pain    • Need for vaccination    • On long term drug therapy    • Shoulder pain    • Tobacco dependence syndrome      Past Surgical History   Procedure Laterality Date   • Appendectomy     • Back surgery     • Cholecystectomy     • Endoscopy and colonoscopy  08/13/2012     Internal & external hemorrhoids found. Scope could not pass into the TI.   • Colonoscopy w/ polypectomy  02/08/2016     External and internal hemorrhoids.The examination was otherwise normal.Fluid aspiration performed.Several biopsies obtained in the entire colon.   • Injection of medication  08/01/2013     METHYLPREDNISONE, X2   • Hernia repair     • Endoscopy  02/08/2016     Mildly severe esophagitis.Gastritis.Normal examined duodenum.Several biopsies obtained in the lower third of the  "esophagus.Several biopsies obtained in the gastric antrum.Several biopsies obtained in the first part of the duodenum.   • Endoscopy w/ peg tube placement  08/13/2012     Esophagitis seen. Biopsy taken. Gastritis in stomach. Biopsy taken. Normal duodenum. Biopsy taken.   • Hemorrhoidectomy     • Injection of medication  11/03/2011     KENALOG   • Injection of medication  02/24/2011     ROCEPHIN   • Tonsillectomy and adenoidectomy     • Total abdominal hysterectomy with salpingo oophorectomy     • Upper gastrointestinal endoscopy  02/08/2016   • Colonoscopy  02/08/2016     Social History     Social History   • Marital status:      Spouse name: N/A   • Number of children: N/A   • Years of education: N/A     Social History Main Topics   • Smoking status: Current Every Day Smoker     Packs/day: 1.00     Types: Cigarettes   • Smokeless tobacco: Never Used   • Alcohol use No   • Drug use: No   • Sexual activity: Defer     Other Topics Concern   • None     Social History Narrative     Family History   Problem Relation Age of Onset   • Coronary artery disease Mother    • Cancer Father    • Breast cancer Sister    • Cancer Other      COLORECTAL   • Endometrial cancer Other    • Ovarian cancer Other           Objective     Blood pressure 118/70, pulse 106, height 62\" (157.5 cm), weight 160 lb (72.6 kg), SpO2 98 %.  Physical Exam   Constitutional: She is oriented to person, place, and time. Vital signs are normal. She appears well-developed and well-nourished.   HENT:   Head: Normocephalic and atraumatic.   Nose: Mucosal edema present.   Mouth/Throat: Uvula is midline, oropharynx is clear and moist and mucous membranes are normal.   Mallampati 2, cobblestoning   Eyes: Conjunctivae, EOM and lids are normal. Pupils are equal, round, and reactive to light.   Neck: Trachea normal and normal range of motion. No tracheal tenderness present. No thyroid mass present.   Cardiovascular: Normal rate, regular rhythm and normal " heart sounds.  Exam reveals no gallop.    No murmur heard.  Pulmonary/Chest: Effort normal. No respiratory distress. She has wheezes (course wheeze on forced expiration). She has no rhonchi. Chest wall is not dull to percussion. She exhibits no tenderness.   Abdominal: Soft. Normal appearance and bowel sounds are normal. There is no tenderness.       Vascular Status -  Her exam exhibits no right foot edema. Her exam exhibits no left foot edema.  Lymphadenopathy:        Head (right side): No submandibular adenopathy present.        Head (left side): No submandibular adenopathy present.     She has no cervical adenopathy.        Right: No supraclavicular adenopathy present.        Left: No supraclavicular adenopathy present.   Neurological: She is alert and oriented to person, place, and time.   Skin: Skin is warm and dry.   Psychiatric: She has a normal mood and affect. Her speech is normal and behavior is normal. Judgment normal.   Nursing note and vitals reviewed.      PFTs: 2/9/17  Ratio 79  FVC 3.01/ 96%  FEV1 2.37/ 98%  TLC 7.26/ 152%  DLCO 11.53/ 53%  Normal spirometry and volumes.  Moderately reduced diffusion.  No significant bronchodilator response.    Radiology (independently reviewed and interpreted by me): CXR 1/26/17 showed NACPD.       Assessment/Plan     Marguerite was seen today for shortness of breath.    Diagnoses and all orders for this visit:    Chronic obstructive pulmonary disease, unspecified COPD type    Dyspnea on exertion    Tobacco dependence syndrome  -     nicotine (EQ NICOTINE) 21 MG/24HR patch; Place 1 patch on the skin Daily.    Upper respiratory tract infection, unspecified type    Lung nodule  -     CT Chest Without Contrast; Future         Discussion/ Recommendations:   Spirometry and volumes are normal, but diffusion is moderately reduced.  Her spirometry may be normalized due to current use of Symbicort.  Unfortunately, she does continue to smoke which I think contributes to a lot of  her current symptoms.  I do not have the CT abdomen and pelvis available which showed an upper lobe 5 mm nodule in 2013, but due to her smoking history and existence of this nodule, she is due for CT of the chest.    -Continue Symbicort 2 puffs twice a day and albuterol as needed.  -Cancel PFTs scheduled for next week.  -Obtain CT chest without contrast to evaluate for previously identified upper lobe nodule.  Follow-up after CT to review the results.  -I encouraged the patient to avoid all tobacco products.  I counselled her on ways to quit smoking, and spoke to the detriment of second hand smoke.  Discussed ways in which to quit, including with the assistance of nicotine replacement products or medications.  Patient may benefit from group therapy, and 1-800-QUIT-NOW was recommended for support. Spent 6 mins.  -Advised to start nasal saline frequently to assist with current nasal congestion.  Would use Flonase if congestion continues.  -Follow up with Dr. Puga following planned cardiac testing.           Return in about 2 weeks (around 2/23/2017) for follow-up CT chest.      Thank you for allowing me to participate in the care of Marguerite Rob. Please do not hesitate to contact me with any questions.         This document has been electronically signed by Denisse Marcano MD on February 9, 2017 10:51 AM      EMR Dragon/Transcription disclaimer:     Much of this encounter note is an electronic transcription/translation of spoken language to printed text. The electronic translation of spoken language may permit erroneous, or at times, nonsensical words or phrases to be inadvertently transcribed; Although I have reviewed the note for such errors, some may still exist.

## 2017-02-13 ENCOUNTER — HOSPITAL ENCOUNTER (OUTPATIENT)
Dept: CT IMAGING | Facility: HOSPITAL | Age: 57
Discharge: HOME | End: 2017-02-13
Admitting: INTERNAL MEDICINE

## 2017-02-13 ENCOUNTER — HOSPITAL ENCOUNTER (OUTPATIENT)
Dept: PULMONOLOGY | Facility: HOSPITAL | Age: 57
End: 2017-02-13
Attending: INTERNAL MEDICINE

## 2017-02-13 DIAGNOSIS — R91.1 LUNG NODULE: ICD-10-CM

## 2017-02-13 PROCEDURE — 71250 CT THORAX DX C-: CPT

## 2017-02-23 ENCOUNTER — OFFICE VISIT (OUTPATIENT)
Dept: PULMONOLOGY | Facility: CLINIC | Age: 57
End: 2017-02-23

## 2017-02-23 VITALS
OXYGEN SATURATION: 99 % | DIASTOLIC BLOOD PRESSURE: 78 MMHG | HEART RATE: 102 BPM | WEIGHT: 155.3 LBS | HEIGHT: 62 IN | BODY MASS INDEX: 28.58 KG/M2 | SYSTOLIC BLOOD PRESSURE: 120 MMHG

## 2017-02-23 DIAGNOSIS — J30.9 CHRONIC ALLERGIC RHINITIS: ICD-10-CM

## 2017-02-23 DIAGNOSIS — F17.200 TOBACCO DEPENDENCE SYNDROME: ICD-10-CM

## 2017-02-23 DIAGNOSIS — J44.9 COPD WITH ASTHMA (HCC): ICD-10-CM

## 2017-02-23 DIAGNOSIS — R91.1 LUNG NODULE: Primary | ICD-10-CM

## 2017-02-23 PROCEDURE — 99214 OFFICE O/P EST MOD 30 MIN: CPT | Performed by: INTERNAL MEDICINE

## 2017-02-23 PROCEDURE — 99406 BEHAV CHNG SMOKING 3-10 MIN: CPT | Performed by: INTERNAL MEDICINE

## 2017-02-23 RX ORDER — MONTELUKAST SODIUM 10 MG/1
10 TABLET ORAL NIGHTLY
Qty: 30 TABLET | Refills: 11 | Status: SHIPPED | OUTPATIENT
Start: 2017-02-23

## 2017-02-23 RX ORDER — FLUTICASONE PROPIONATE 50 MCG
1 SPRAY, SUSPENSION (ML) NASAL DAILY
COMMUNITY
Start: 2017-02-13

## 2017-02-23 NOTE — PROGRESS NOTES
Pulmonary Office Follow-up    Subjective     Marguerite Rob is seen today at the office for   Chief Complaint   Patient presents with   • Follow-up     2 week thomas, ct results         HPI  Marguerite Rob is a 56 y.o. female with a PMH significant for COPD/ asthma, tobacco use, and CHF who presents for follow-up of recent CT chest. Pt was initially seen by me on 2/9/17 at which time I recommended continuing Symbicort as well as smoking cessation. She had a CT chest to follow-up a previously described lung nodule which was performed on 2/13/17 and showed a 4mm subpleural nodule adjacent to the right major fissure in the RLL as well as scattered lower lobe blebs. She continues to use Symbicort but she is having a lot of nasal congestion. Pt has been trying flonase but it did not help so she has been putting Vicks salve in her nose. She did not try saline spray. Pt denies wheeze, fevers/ chills, epistaxis. She has had a cough productive of black stuff. She has only smoked 3 cigarettes in the past week with using the patch.       Patient Active Problem List   Diagnosis   • COPD (chronic obstructive pulmonary disease)   • Tobacco dependence syndrome   • Lung nodule   • Chronic allergic rhinitis   • COPD with asthma       Review of Systems  Review of Systems   HENT: Positive for congestion, postnasal drip and rhinorrhea.    Respiratory: Positive for cough and shortness of breath. Negative for wheezing.    Cardiovascular: Negative for leg swelling.   Gastrointestinal: Positive for abdominal pain.   Musculoskeletal: Positive for back pain.   Psychiatric/Behavioral: Positive for dysphoric mood.     As described in the HPI. Otherwise, remainder of ROS (14 systems) were negative.    Medications, Allergies, Social, and Family Histories reviewed as per EMR.    Objective     Vitals:    02/23/17 0901   BP: 120/78   Pulse: 102   SpO2: 99%     Physical Exam   Constitutional: She is oriented to person, place, and time. Vital signs  are normal. She appears well-developed and well-nourished.   HENT:   Head: Normocephalic and atraumatic.   Nose: Mucosal edema and rhinorrhea present.   Mouth/Throat: Uvula is midline, oropharynx is clear and moist and mucous membranes are normal.   Mallampati 2, cobblestoning   Eyes: Conjunctivae, EOM and lids are normal. Pupils are equal, round, and reactive to light.   Neck: Trachea normal and normal range of motion. No tracheal tenderness present. No thyroid mass present.   Cardiovascular: Normal rate, regular rhythm and normal heart sounds.  Exam reveals no gallop.    No murmur heard.  Pulmonary/Chest: Effort normal. No respiratory distress. She has no wheezes. She has no rhonchi. Chest wall is not dull to percussion. She exhibits no tenderness.   Abdominal: Soft. Normal appearance and bowel sounds are normal. There is no tenderness.       Vascular Status -  Her exam exhibits no right foot edema. Her exam exhibits no left foot edema.  Lymphadenopathy:        Head (right side): No submandibular adenopathy present.        Head (left side): No submandibular adenopathy present.     She has no cervical adenopathy.        Right: No supraclavicular adenopathy present.        Left: No supraclavicular adenopathy present.   Neurological: She is alert and oriented to person, place, and time.   Skin: Skin is warm and dry.   Psychiatric: She has a normal mood and affect. Her speech is normal and behavior is normal. Judgment normal.   Nursing note and vitals reviewed.    IMAGING: CT chest 2/13/17 (independently reviewed and interpreted by me) showed 4mm RLL subpleural nodule and scattered lower lobe blebs as well as a fatty liver.      Assessment/Plan     Marguerite was seen today for follow-up.    Diagnoses and all orders for this visit:    Lung nodule    Tobacco dependence syndrome    Chronic allergic rhinitis  -     montelukast (SINGULAIR) 10 MG tablet; Take 1 tablet by mouth Every Night.    COPD with asthma  -     montelukast  (SINGULAIR) 10 MG tablet; Take 1 tablet by mouth Every Night.         Discussion/ Recommendations:   -No further f/u of RLL nodule. Can resume annual LDCT in February 2018.  -Continue Symbicort BID and albuterol prn  -Continue Flonase daily.  -Add nasal saline and avoid petroleum-based products within the nose due to risk of aspiration causing a lipoid pneumonia.  -Stop Claritin.  -Start Singulair daily.  If continues to have issues with congestion and allergies, would add Zyrtec temporarily.  -Again, encouraged complete tobacco cessation. Offered support as well as recommendations for NRT and support groups. Spent 4mins.      Return in about 2 months (around 4/23/2017) for Recheck.          This document has been electronically signed by Denisse Marcano MD on February 23, 2017 9:49 AM      EMR Dragon/Transcription disclaimer:     Much of this encounter note is an electronic transcription/translation of spoken language to printed text. The electronic translation of spoken language may permit erroneous, or at times, nonsensical words or phrases to be inadvertently transcribed; Although I have reviewed the note for such errors, some may still exist.

## 2017-03-01 ENCOUNTER — OFFICE VISIT (OUTPATIENT)
Dept: OTOLARYNGOLOGY | Facility: CLINIC | Age: 57
End: 2017-03-01

## 2017-03-01 VITALS — WEIGHT: 153 LBS | TEMPERATURE: 98.6 F | HEIGHT: 62 IN | BODY MASS INDEX: 28.16 KG/M2

## 2017-03-01 DIAGNOSIS — Z71.6 TOBACCO ABUSE COUNSELING: ICD-10-CM

## 2017-03-01 DIAGNOSIS — E04.1 RIGHT THYROID NODULE: Primary | ICD-10-CM

## 2017-03-01 PROCEDURE — 99203 OFFICE O/P NEW LOW 30 MIN: CPT | Performed by: OTOLARYNGOLOGY

## 2017-03-01 NOTE — PROGRESS NOTES
Subjective   Marguerite Rob is a 56 y.o. female.     History of Present Illness   CC thyroid nodule  Patient has a thyroid nodule which is noted on an thyroid ultrasound because she hadn't palpable masses or left neck.  The palpable mass was associated with upper respiratory infection is now resolved.  7 with an ultrasound report which reveals a very small right-sided thyroid nodule in the midportion of the gland.  Not having any new neck problems she is a smoker family history of both benign and malignant thyroid disease.    She is unsure of the type of cancer but her father had thyroid cancer.  Her mother had some sort of thyroid disease which was not cancer according to the patient.  It's no change or swallowing she has had some sore throat but that's not new she's not have any major change in her voice and other than that one neck mass persisted.  She's had no other long-lasting neck masses.  She says she's not  any thyroid blood testing.  He does not have symptoms clearly referable to hyper or hypothyroidism.    The following portions of the patient's history were reviewed and updated as appropriate: allergies, current medications, past family history, past medical history, past social history, past surgical history and problem list.      Marguerite Rob reports that she has been smoking Cigarettes.  She has been smoking about 0.25 packs per day. She has never used smokeless tobacco. She reports that she does not drink alcohol or use illicit drugs.  Patient is a tobacco user and has been counseled for use of tobacco products    Family History   Problem Relation Age of Onset   • Coronary artery disease Mother    • Diabetes Mother    • Heart failure Mother    • Cancer Father    • Diabetes Father    • Heart failure Father    • Thyroid disease Father    • Breast cancer Sister    • Cancer Other      COLORECTAL   • Endometrial cancer Other    • Ovarian cancer Other    • Diabetes Brother          Current Outpatient  Prescriptions:   •  albuterol (PROVENTIL HFA;VENTOLIN HFA) 108 (90 BASE) MCG/ACT inhaler, Inhale 2 puffs Every 4 (Four) Hours As Needed for wheezing., Disp: , Rfl:   •  albuterol (PROVENTIL) (5 MG/ML) 0.5% nebulizer solution, Take 2.5 mg by nebulization Every 6 (Six) Hours As Needed for wheezing., Disp: , Rfl:   •  amitriptyline (ELAVIL) 100 MG tablet, Take 100 mg by mouth Every Night., Disp: , Rfl:   •  amLODIPine (NORVASC) 5 MG tablet, Take 5 mg by mouth Daily., Disp: , Rfl:   •  budesonide-formoterol (SYMBICORT) 160-4.5 MCG/ACT inhaler, Inhale 2 puffs 2 (Two) Times a Day., Disp: , Rfl:   •  famotidine (PEPCID) 40 MG tablet, Take 40 mg by mouth At Night As Needed for heartburn., Disp: , Rfl:   •  fluconazole (DIFLUCAN) 100 MG tablet, Take 100 mg by mouth 1 (One) Time Per Week., Disp: , Rfl:   •  fluticasone (FLONASE) 50 MCG/ACT nasal spray, , Disp: , Rfl:   •  furosemide (LASIX) 40 MG tablet, Take 40 mg by mouth Daily., Disp: , Rfl:   •  gemfibrozil (LOPID) 600 MG tablet, Take 600 mg by mouth 2 (Two) Times a Day Before Meals., Disp: , Rfl:   •  HYDROcodone-acetaminophen (NORCO)  MG per tablet, Take 1 tablet by mouth 3 (Three) Times a Day., Disp: , Rfl:   •  JANUMET XR  MG tablet sustained-release 24 hour, , Disp: , Rfl:   •  lidocaine (LIDODERM) 5 %, Place 1 patch on the skin Daily. Remove & Discard patch within 12 hours or as directed by MD, Disp: , Rfl:   •  montelukast (SINGULAIR) 10 MG tablet, Take 1 tablet by mouth Every Night., Disp: 30 tablet, Rfl: 11  •  nicotine (EQ NICOTINE) 21 MG/24HR patch, Place 1 patch on the skin Daily., Disp: 28 patch, Rfl: 11  •  pantoprazole (PROTONIX) 20 MG EC tablet, Take 20 mg by mouth Daily., Disp: , Rfl:   •  potassium chloride (K-DUR,KLOR-CON) 20 MEQ CR tablet, Take 20 mEq by mouth 2 (Two) Times a Day., Disp: , Rfl:   •  pravastatin (PRAVACHOL) 20 MG tablet, Take 20 mg by mouth Every Night., Disp: , Rfl:   •  sitaGLIPtin-metFORMIN (JANUMET)  MG per  tablet, Take 1 tablet by mouth 2 (Two) Times a Day With Meals., Disp: , Rfl:   •  sennosides-docusate sodium (SENOKOT-S) 8.6-50 MG tablet, Take 2 tablets by mouth Every Night., Disp: , Rfl:       Past Medical History   Diagnosis Date   • Alcoholic fatty liver    • Alkaline phosphatase raised    • Anxiety    • Asthma      IgE-MEDIATED ALLERGIC ASTHMA   • Backache      CHRONIC   • CHF (congestive heart failure)    • Chronic hepatitis C      2b. Fibrosure .05/F0, necroinflam .14/A0. Repeat .05/F0, .13/A0      • Chronic neck pain    • Constipation    • COPD (chronic obstructive pulmonary disease)    • Diabetes    • Diarrhea    • Elevated levels of transaminase & lactic acid dehydrogenase    • Emphysema, unspecified    • Generalized abdominal pain    • GERD (gastroesophageal reflux disease)      WITH ESOPHAGITIS   • Hepatitis    • High risk sexual behavior    • Hypertension    • Hypokalemia    • Irritable bowel syndrome (IBS)    • Multiple joint pain    • Need for vaccination    • On long term drug therapy    • Shoulder pain    • Tobacco dependence syndrome          Review of Systems   Constitutional: Positive for fatigue.   HENT: Positive for sore throat.    Eyes: Positive for visual disturbance.   Respiratory: Positive for shortness of breath and wheezing.    Cardiovascular: Negative.    Gastrointestinal: Positive for abdominal pain.        Heart burn   Genitourinary: Positive for frequency.   Musculoskeletal: Positive for arthralgias.   Allergic/Immunologic: Negative.    Neurological: Positive for headaches.   Hematological: Negative.    Psychiatric/Behavioral:        Depression           Objective   Physical Exam   Constitutional: She is oriented to person, place, and time. She appears well-developed and well-nourished.   HENT:   Head: Normocephalic and atraumatic.   Right Ear: Hearing, tympanic membrane, external ear and ear canal normal.   Left Ear: Hearing, tympanic membrane, external ear and ear canal normal.    Nose: Nose normal. No mucosal edema, rhinorrhea, nasal deformity or septal deviation. No epistaxis. Right sinus exhibits no maxillary sinus tenderness and no frontal sinus tenderness. Left sinus exhibits no maxillary sinus tenderness and no frontal sinus tenderness.   Mouth/Throat: Uvula is midline and oropharynx is clear and moist. No trismus in the jaw. Normal dentition. No oropharyngeal exudate or posterior oropharyngeal edema.       Eyes: Conjunctivae and EOM are normal. Pupils are equal, round, and reactive to light.   Neck: Normal range of motion. Neck supple. No JVD present. No tracheal deviation present. No thyromegaly present.   Cardiovascular: Normal rate and regular rhythm.    Pulmonary/Chest: Effort normal and breath sounds normal.   Musculoskeletal: Normal range of motion.   Lymphadenopathy:        Head (right side): No submental, no submandibular, no tonsillar, no preauricular, no posterior auricular and no occipital adenopathy present.        Head (left side): No submental, no submandibular, no tonsillar, no preauricular, no posterior auricular and no occipital adenopathy present.     She has no cervical adenopathy.        Right cervical: No superficial cervical, no deep cervical and no posterior cervical adenopathy present.       Left cervical: No superficial cervical, no deep cervical and no posterior cervical adenopathy present.   Neurological: She is alert and oriented to person, place, and time. No cranial nerve deficit.   Skin: Skin is warm.   Psychiatric: She has a normal mood and affect. Her speech is normal and behavior is normal. Thought content normal.   Nursing note and vitals reviewed.    No palpable thyroid nodule, salivary gland enlargement , or  Adenopathy or other neck masses    See rport of 3-4mm thyroid nodule.    Assessment/Plan   Marguerite was seen today for thyroid problem.    Diagnoses and all orders for this visit:    Right thyroid nodule    Tobacco abuse counseling    Other  orders  -     TSH; Future  -     T4, free; Future  -     US Thyroid; Future   I discussed the treatment options for this patient which included observation, FNA of the small thyroid nodule, repeat ultrasound, surgical excision.  This should be closely followed the patient agrees to repeat ultrasound to more months which will be 3 months after her last study.  We will do this at the same institution she had a previous ultrasound get a good comparison.  There is any enlargement and FNA is indicated at this size can be somewhat difficult for them to get adequate tissue.  She is agreement this approach.  She wanted to have the repeat ultrasound done the same location because its takes an hour and a half to get to this facility to do the ultrasound.  Also get the blood work done to have adequate understanding of her thyroid function.  As there is a history of thyroid cancer in the family is any enlargement of this nodule she actually may need a full surgical procedure for prefers this approach area especially in light of the fact her diabetes is not well controlled currently she still smoking that we lengthy discussion about smoking cessation.  Continue work on this and she is down to 3 cigarettes per day from 2 packs a day previously.  Sling through the importance of long-term follow-up and not neglecting this nodule

## 2017-03-03 ENCOUNTER — OFFICE VISIT (OUTPATIENT)
Dept: CARDIOLOGY | Facility: CLINIC | Age: 57
End: 2017-03-03

## 2017-03-03 VITALS
DIASTOLIC BLOOD PRESSURE: 72 MMHG | BODY MASS INDEX: 29.15 KG/M2 | WEIGHT: 158.4 LBS | SYSTOLIC BLOOD PRESSURE: 112 MMHG | HEART RATE: 111 BPM | HEIGHT: 62 IN | OXYGEN SATURATION: 97 %

## 2017-03-03 DIAGNOSIS — R07.2 PRECORDIAL PAIN: Primary | ICD-10-CM

## 2017-03-03 DIAGNOSIS — E78.2 MIXED DYSLIPIDEMIA: ICD-10-CM

## 2017-03-03 DIAGNOSIS — J44.9 CHRONIC OBSTRUCTIVE PULMONARY DISEASE, UNSPECIFIED COPD TYPE (HCC): ICD-10-CM

## 2017-03-03 DIAGNOSIS — R06.09 DYSPNEA ON EXERTION: ICD-10-CM

## 2017-03-03 DIAGNOSIS — I10 BENIGN ESSENTIAL HTN: ICD-10-CM

## 2017-03-03 PROBLEM — K21.00 GERD WITH ESOPHAGITIS: Status: ACTIVE | Noted: 2017-03-03

## 2017-03-03 PROBLEM — R06.00 DYSPNEA: Status: ACTIVE | Noted: 2017-03-03

## 2017-03-03 PROBLEM — R07.9 CHEST PAIN: Status: ACTIVE | Noted: 2017-03-03

## 2017-03-03 PROBLEM — K75.81 NASH (NONALCOHOLIC STEATOHEPATITIS): Status: ACTIVE | Noted: 2017-03-03

## 2017-03-03 PROCEDURE — 99214 OFFICE O/P EST MOD 30 MIN: CPT | Performed by: NURSE PRACTITIONER

## 2017-03-03 PROCEDURE — 99406 BEHAV CHNG SMOKING 3-10 MIN: CPT | Performed by: NURSE PRACTITIONER

## 2017-03-03 NOTE — PROGRESS NOTES
"Subjective:     Shortness of Breath (5 week follow up)      Shortness of Breath   This is a chronic problem. The problem occurs intermittently. The problem has been rapidly improving. Pertinent negatives include no abdominal pain, chest pain, claudication, fever, leg swelling, rash or wheezing. The symptoms are aggravated by weather changes. She has tried beta agonist inhalers, steroid inhalers, rest and OTC cough suppressants for the symptoms. The treatment provided moderate relief.     Ms. Rob presents today for test results and follow up for chest pain and dyspnea. Ischemic work up negative. She has not had any chest pain since last visit. Her most obvious condition is her abdominal distention due to TORRES and enlarged liver.   Ms. Rob has a bruise on her chin. When I asked what happened she told me that on Tuesday she \"passed out and hit her chin on the counter\". Event was witnessed. When asked about loss of consciousness, she said yes and that \"they almost had to do CPR\" and then she came around. She was transported to  [facility] where she was treated for acute bronchitis, pneumonia, hyperglycemia, and released with Levaquin PO. I encouraged her, if this happens again, to transport to [facility] for neurology coverage.     Review of Systems   Constitution: Negative for chills, decreased appetite, fever and weakness.   HENT: Negative.    Eyes: Negative.    Cardiovascular: Positive for dyspnea on exertion. Negative for chest pain, claudication, irregular heartbeat, leg swelling and palpitations.   Respiratory: Negative for cough, shortness of breath and wheezing.         Nail clubbing     Endocrine: Negative.    Skin: Positive for nail changes. Negative for dry skin, flushing and rash.   Musculoskeletal: Positive for arthritis. Negative for falls and myalgias.   Gastrointestinal: Positive for bloating and heartburn. Negative for abdominal pain, change in bowel habit and melena.   Genitourinary: Negative for " frequency and hematuria.   Neurological: Negative for dizziness, light-headedness and loss of balance.   Psychiatric/Behavioral: Negative for altered mental status and memory loss. The patient is not nervous/anxious.        Current Outpatient Prescriptions   Medication Sig Dispense Refill   • albuterol (PROVENTIL HFA;VENTOLIN HFA) 108 (90 BASE) MCG/ACT inhaler Inhale 2 puffs Every 4 (Four) Hours As Needed for wheezing.     • albuterol (PROVENTIL) (5 MG/ML) 0.5% nebulizer solution Take 2.5 mg by nebulization Every 6 (Six) Hours As Needed for wheezing.     • amitriptyline (ELAVIL) 100 MG tablet Take 100 mg by mouth Every Night.     • amLODIPine (NORVASC) 5 MG tablet Take 5 mg by mouth Daily.     • budesonide-formoterol (SYMBICORT) 160-4.5 MCG/ACT inhaler Inhale 2 puffs 2 (Two) Times a Day.     • famotidine (PEPCID) 40 MG tablet Take 40 mg by mouth At Night As Needed for heartburn.     • fluconazole (DIFLUCAN) 100 MG tablet Take 100 mg by mouth 1 (One) Time Per Week.     • fluticasone (FLONASE) 50 MCG/ACT nasal spray      • furosemide (LASIX) 40 MG tablet Take 40 mg by mouth Daily.     • gemfibrozil (LOPID) 600 MG tablet Take 600 mg by mouth 2 (Two) Times a Day Before Meals.     • HYDROcodone-acetaminophen (NORCO)  MG per tablet Take 1 tablet by mouth 3 (Three) Times a Day.     • JANUMET XR  MG tablet sustained-release 24 hour      • lidocaine (LIDODERM) 5 % Place 1 patch on the skin Daily. Remove & Discard patch within 12 hours or as directed by MD     • montelukast (SINGULAIR) 10 MG tablet Take 1 tablet by mouth Every Night. 30 tablet 11   • nicotine (EQ NICOTINE) 21 MG/24HR patch Place 1 patch on the skin Daily. 28 patch 11   • pantoprazole (PROTONIX) 20 MG EC tablet Take 20 mg by mouth Daily.     • potassium chloride (K-DUR,KLOR-CON) 20 MEQ CR tablet Take 20 mEq by mouth 2 (Two) Times a Day.     • pravastatin (PRAVACHOL) 20 MG tablet Take 20 mg by mouth Every Night.     • sennosides-docusate sodium  "(SENOKOT-S) 8.6-50 MG tablet Take 2 tablets by mouth Every Night.     • sitaGLIPtin-metFORMIN (JANUMET)  MG per tablet Take 1 tablet by mouth 2 (Two) Times a Day With Meals.       No current facility-administered medications for this visit.         Objective:     Vitals:    03/03/17 0902   BP: 112/72   BP Location: Left arm   Patient Position: Sitting   Cuff Size: Adult   Pulse: 111   SpO2: 97%   Weight: 158 lb 6.4 oz (71.8 kg)   Height: 62\" (157.5 cm)          Physical Exam   Constitutional: She is oriented to person, place, and time. She appears well-developed and well-nourished. No distress.   HENT:   Head: Normocephalic.   Neck: No JVD present.   Cardiovascular: Normal rate, regular rhythm, S1 normal, S2 normal, normal heart sounds and intact distal pulses.    No murmur heard.  Pulmonary/Chest: Effort normal and breath sounds normal. No respiratory distress. She has no wheezes. She has no rales.   Abdominal: Soft. Bowel sounds are normal.   Musculoskeletal: Normal range of motion. She exhibits no edema.   Neurological: She is alert and oriented to person, place, and time.   Skin: Skin is warm and dry. Bruising (chin from fall) noted. No cyanosis or erythema. Nails show clubbing.   Psychiatric: She has a normal mood and affect. Her behavior is normal. Judgment and thought content normal.       PFT's:  Normal spirometry  FEV1- 95% predicted    6MWT 2/7/2017:  Results in distance walked:360.46 m  Did individual experience any pain or discomfort:no  NYHA functional class II.    Cardiographics  Echocardiogram 2/1/2017  Interpretation Summary by Dr. Dowell  · All left ventricular wall segments contract normally.  · Left ventricular function is normal.     ECG: Sinus tachycardia with a ventricular rate of 104.  Incomplete right bundle branch block.    Imaging  Chest x-ray:    CT Scan:  FINDINGS:     LUNGS/PLEURA: 4 mm subpleural nodule adjacent to the right major fissure in the right lower lobe of doubtful " significance. Scattered air-filled blebs are noted in the lower lobes. Lungs otherwise appear clear  TRACHEA AND BRONCHI: The trachea and bronchi are patent.  MEDIASTINUM, JEANNETTE AND LYMPH NODES: No suspicious lymph node enlargement.  HEART AND PERICARDIUM: The heart and pericardium are normal.  VASCULAR: Unremarkable  UPPER ABDOMEN: Fatty liver  OSSEOUS STRUCTURES: Unremarkable.     IMPRESSION:  CONCLUSION:  Fatty liver. Otherwise unremarkable exam     Electronically signed by: Irvin Rowley MD 2/13/2017 11:34 AM CST    Stress Test 2/1/2017:  Interpretation Summary by Dr. Puga     · A 2-day rest/stress protocol myocardial perfusion imaging study was performed  · A pharmacological stress test was performed using regadenoson without low-level exercise.  · No ECG evidence of myocardial ischemia  · Myocardial perfusion imaging indicates a normal myocardial perfusion study with no evidence of ischemia  · TID ratio of 1.4  · Overall, low-risk study,         Lab Review      Ref. Range 1/26/2017 12:09   proBNP Latest Ref Range: 0.0 - 900.0 pg/mL 17.2   Alkaline Phosphatase Latest Ref Range: 38 - 126 U/L 154 (H)   Total Protein Latest Ref Range: 6.3 - 8.6 g/dL 8.1   ALT (SGPT) Latest Ref Range: 9 - 52 U/L 40   AST (SGOT) Latest Ref Range: 14 - 36 U/L 30   Total Bilirubin Latest Ref Range: 0.2 - 1.3 mg/dL 0.4   Albumin Latest Ref Range: 3.40 - 4.80 g/dL 5.00 (H)   Bilirubin, Direct Latest Ref Range: 0.0 - 0.3 mg/dL 0.0   Bilirubin, Indirect Latest Ref Range: 0.0 - 1.1 mg/dL 0.0   Total Cholesterol Latest Ref Range: 0 - 199 mg/dL 150   HDL Cholesterol Latest Ref Range: 60 - 200 mg/dL 31 (L)   LDL Cholesterol  Latest Ref Range: 1 - 129 mg/dL 83   Triglycerides Latest Ref Range: 20 - 199 mg/dL 336 (H)   LDL/HDL Ratio Latest Ref Range: 0.00 - 3.22  1.67   CRP, High Sensitivity Latest Ref Range: 0.00 - 3.00 mg/L 1.75       The following portions of the patient's history were reviewed and updated as appropriate: allergies,  current medications, past family history, past medical history, past social history, past surgical history and problem list.     Assessment/Plan:   Ms. Rob presents today for test results and follow up for chest pain and dyspnea. Her work up for ischemic cardiac disease is negative. She is currently treated for COPD, however her PFT results were normal. This should not be the source of dyspnea. The most likely cause of dyspnea is exercise intolerance and obesity. She has an enlarged liver (Viral Hep C) and a very distended abdomen. This extra weight is her major complaint, and likely source of MAURICE.     Diagnosis Plan   1. Precordial pain  non cardiac chest pain.   Has not had chest pain since last visit.   History of GERD with esophagitis.     2. Dyspnea on exertion  Improved. No heart failure. Most likely due to abdominal distention.      3. Benign essential HTN  Controlled on Norvasc and Lasix     4. Chronic obstructive pulmonary disease, unspecified COPD type  Inhalers. Seeing Dr. Marcano  Currently, she has been diagnosed with acute bronchitis from  [facility]. She was given levaquin and cough syrup. She cannot tolerate Mucinex due to heart racing. Told to follow up with PCP for ongoing symptoms or if fever develops.     5. Mixed dyslipidemia  Pravastatin 20mg     6. Tobacco Abuse Still smoking 3-5 ciggarettes a day. Smoking cessation counseling time spent was greater than 3 minutes but less than 10. Risks of continuing to use tobacco as well as benefits of quitting were discussed. Smoking cessation materials and alternatives were given to the patient. Willingness to quit expressed.         Follow up not needed. Continue seeing PCP, Pulmonlogist, ENT, Gastro, and other specialties that she has appointments with.

## 2017-05-18 ENCOUNTER — OFFICE VISIT (OUTPATIENT)
Dept: OTOLARYNGOLOGY | Facility: CLINIC | Age: 57
End: 2017-05-18

## 2017-05-18 ENCOUNTER — LAB (OUTPATIENT)
Dept: LAB | Facility: HOSPITAL | Age: 57
End: 2017-05-18

## 2017-05-18 ENCOUNTER — TELEPHONE (OUTPATIENT)
Dept: OTOLARYNGOLOGY | Facility: CLINIC | Age: 57
End: 2017-05-18

## 2017-05-18 VITALS — WEIGHT: 152 LBS | BODY MASS INDEX: 27.97 KG/M2 | HEIGHT: 62 IN | TEMPERATURE: 98.5 F

## 2017-05-18 DIAGNOSIS — E04.1 RIGHT THYROID NODULE: Primary | ICD-10-CM

## 2017-05-18 DIAGNOSIS — E04.1 RIGHT THYROID NODULE: ICD-10-CM

## 2017-05-18 LAB
T4 FREE SERPL-MCNC: 1.08 NG/DL (ref 0.78–2.19)
TSH SERPL DL<=0.05 MIU/L-ACNC: 2.73 MIU/ML (ref 0.46–4.68)

## 2017-05-18 PROCEDURE — 84439 ASSAY OF FREE THYROXINE: CPT | Performed by: OTOLARYNGOLOGY

## 2017-05-18 PROCEDURE — 84443 ASSAY THYROID STIM HORMONE: CPT | Performed by: OTOLARYNGOLOGY

## 2017-05-18 PROCEDURE — 99213 OFFICE O/P EST LOW 20 MIN: CPT | Performed by: OTOLARYNGOLOGY

## 2017-05-18 PROCEDURE — 36415 COLL VENOUS BLD VENIPUNCTURE: CPT

## 2017-06-15 ENCOUNTER — HOSPITAL ENCOUNTER (OUTPATIENT)
Dept: ULTRASOUND IMAGING | Facility: HOSPITAL | Age: 57
Discharge: HOME | End: 2017-06-15
Admitting: PHYSICIAN ASSISTANT

## 2017-06-15 DIAGNOSIS — B18.2 CHRONIC HEPATITIS C WITHOUT HEPATIC COMA (HCC): ICD-10-CM

## 2017-06-15 DIAGNOSIS — R10.9 CHRONIC ABDOMINAL PAIN: ICD-10-CM

## 2017-06-15 DIAGNOSIS — R07.89 OTHER CHEST PAIN: ICD-10-CM

## 2017-06-15 DIAGNOSIS — G89.29 CHRONIC ABDOMINAL PAIN: ICD-10-CM

## 2017-06-15 DIAGNOSIS — K75.81 NASH (NONALCOHOLIC STEATOHEPATITIS): ICD-10-CM

## 2017-06-15 PROCEDURE — 76705 ECHO EXAM OF ABDOMEN: CPT

## 2017-07-17 ENCOUNTER — LAB (OUTPATIENT)
Dept: LAB | Facility: HOSPITAL | Age: 57
End: 2017-07-17

## 2017-07-17 DIAGNOSIS — B18.2 CHRONIC HEPATITIS C WITHOUT HEPATIC COMA (HCC): ICD-10-CM

## 2017-07-17 DIAGNOSIS — B18.2 CHRONIC HEPATITIS C WITHOUT HEPATIC COMA (HCC): Primary | ICD-10-CM

## 2017-07-17 LAB
ALBUMIN SERPL-MCNC: 4.4 G/DL (ref 3.4–4.8)
ALBUMIN/GLOB SERPL: 1.4 G/DL (ref 1.1–1.8)
ALP SERPL-CCNC: 131 U/L (ref 38–126)
ALT SERPL W P-5'-P-CCNC: 32 U/L (ref 9–52)
ANION GAP SERPL CALCULATED.3IONS-SCNC: 13 MMOL/L (ref 5–15)
AST SERPL-CCNC: 27 U/L (ref 14–36)
BILIRUB SERPL-MCNC: 0.2 MG/DL (ref 0.2–1.3)
BUN BLD-MCNC: 8 MG/DL (ref 7–21)
BUN/CREAT SERPL: 17.4 (ref 7–25)
CALCIUM SPEC-SCNC: 8.8 MG/DL (ref 8.4–10.2)
CHLORIDE SERPL-SCNC: 103 MMOL/L (ref 95–110)
CO2 SERPL-SCNC: 22 MMOL/L (ref 22–31)
CREAT BLD-MCNC: 0.46 MG/DL (ref 0.5–1)
DEPRECATED RDW RBC AUTO: 43.8 FL (ref 36.4–46.3)
ERYTHROCYTE [DISTWIDTH] IN BLOOD BY AUTOMATED COUNT: 15 % (ref 11.5–14.5)
GFR SERPL CREATININE-BSD FRML MDRD: 140 ML/MIN/1.73 (ref 60–120)
GLOBULIN UR ELPH-MCNC: 3.2 GM/DL (ref 2.3–3.5)
GLUCOSE BLD-MCNC: 129 MG/DL (ref 60–100)
HCT VFR BLD AUTO: 38 % (ref 35–45)
HGB BLD-MCNC: 12.4 G/DL (ref 12–15.5)
INR PPP: 0.87 (ref 0.8–1.2)
MCH RBC QN AUTO: 26.5 PG (ref 26.5–34)
MCHC RBC AUTO-ENTMCNC: 32.6 G/DL (ref 31.4–36)
MCV RBC AUTO: 81.2 FL (ref 80–98)
PLATELET # BLD AUTO: 226 10*3/MM3 (ref 150–450)
PMV BLD AUTO: 10.8 FL (ref 8–12)
POTASSIUM BLD-SCNC: 4.6 MMOL/L (ref 3.5–5.1)
PROT SERPL-MCNC: 7.6 G/DL (ref 6.3–8.6)
PROTHROMBIN TIME: 11.7 SECONDS (ref 11.1–15.3)
RBC # BLD AUTO: 4.68 10*6/MM3 (ref 3.77–5.16)
SODIUM BLD-SCNC: 138 MMOL/L (ref 137–145)
WBC NRBC COR # BLD: 6.8 10*3/MM3 (ref 3.2–9.8)

## 2017-07-17 PROCEDURE — 80053 COMPREHEN METABOLIC PANEL: CPT

## 2017-07-17 PROCEDURE — 82247 BILIRUBIN TOTAL: CPT

## 2017-07-17 PROCEDURE — 84460 ALANINE AMINO (ALT) (SGPT): CPT

## 2017-07-17 PROCEDURE — 82977 ASSAY OF GGT: CPT

## 2017-07-17 PROCEDURE — 85027 COMPLETE CBC AUTOMATED: CPT

## 2017-07-17 PROCEDURE — 85610 PROTHROMBIN TIME: CPT

## 2017-07-17 PROCEDURE — 83883 ASSAY NEPHELOMETRY NOT SPEC: CPT

## 2017-07-17 PROCEDURE — 82105 ALPHA-FETOPROTEIN SERUM: CPT

## 2017-07-17 PROCEDURE — 36415 COLL VENOUS BLD VENIPUNCTURE: CPT

## 2017-07-17 PROCEDURE — 83010 ASSAY OF HAPTOGLOBIN QUANT: CPT

## 2017-07-18 LAB — AFP-TM SERPL-MCNC: 2.4 NG/ML (ref 0–8.3)

## 2017-07-19 LAB
A2 MACROGLOB SERPL-MCNC: 168 MG/DL (ref 110–276)
ALT SERPL W P-5'-P-CCNC: 25 IU/L (ref 0–40)
APO A-I SERPL-MCNC: 92 MG/DL (ref 116–209)
BILIRUB SERPL-MCNC: 0.1 MG/DL (ref 0–1.2)
FIBROSIS SCORING:: ABNORMAL
FIBROSIS STAGE SERPL QL: ABNORMAL
GGT SERPL-CCNC: 15 IU/L (ref 0–60)
HAPTOGLOB SERPL-MCNC: 155 MG/DL (ref 34–200)
HCV AB SER QL: ABNORMAL
LABORATORY COMMENT REPORT: ABNORMAL
LIMITATIONS:: ABNORMAL
LIVER FIBR SCORE SERPL CALC.FIBROSURE: 0.07 (ref 0–0.21)
NECROINFLAMM ACTIVITY SCORING:: ABNORMAL
NECROINFLAMMATORY ACT GRADE SERPL QL: ABNORMAL
NECROINFLAMMATORY ACT SCORE SERPL: 0.08 (ref 0–0.17)

## 2017-07-26 ENCOUNTER — OFFICE VISIT (OUTPATIENT)
Dept: GASTROENTEROLOGY | Facility: CLINIC | Age: 57
End: 2017-07-26

## 2017-07-26 VITALS
WEIGHT: 151.8 LBS | DIASTOLIC BLOOD PRESSURE: 74 MMHG | HEART RATE: 109 BPM | SYSTOLIC BLOOD PRESSURE: 119 MMHG | BODY MASS INDEX: 27.94 KG/M2 | HEIGHT: 62 IN

## 2017-07-26 DIAGNOSIS — K75.81 NASH (NONALCOHOLIC STEATOHEPATITIS): Primary | ICD-10-CM

## 2017-07-26 DIAGNOSIS — B18.2 CHRONIC HEPATITIS C WITHOUT HEPATIC COMA (HCC): ICD-10-CM

## 2017-07-26 DIAGNOSIS — G89.29 CHRONIC ABDOMINAL PAIN: ICD-10-CM

## 2017-07-26 DIAGNOSIS — R10.9 CHRONIC ABDOMINAL PAIN: ICD-10-CM

## 2017-07-26 DIAGNOSIS — K21.00 GASTROESOPHAGEAL REFLUX DISEASE WITH ESOPHAGITIS: ICD-10-CM

## 2017-07-26 PROCEDURE — 99213 OFFICE O/P EST LOW 20 MIN: CPT | Performed by: PHYSICIAN ASSISTANT

## 2017-07-26 RX ORDER — CETIRIZINE HYDROCHLORIDE 10 MG/1
10 TABLET ORAL DAILY
COMMUNITY

## 2017-07-26 RX ORDER — POTASSIUM CHLORIDE 750 MG/1
30 TABLET, EXTENDED RELEASE ORAL 2 TIMES DAILY
COMMUNITY

## 2017-07-26 RX ORDER — FUROSEMIDE 20 MG/1
40 TABLET ORAL 2 TIMES DAILY
COMMUNITY

## 2017-07-26 RX ORDER — PANTOPRAZOLE SODIUM 40 MG/1
40 TABLET, DELAYED RELEASE ORAL DAILY
COMMUNITY
End: 2018-02-15 | Stop reason: DRUGHIGH

## 2017-11-10 ENCOUNTER — HOSPITAL ENCOUNTER (OUTPATIENT)
Dept: ULTRASOUND IMAGING | Facility: HOSPITAL | Age: 57
Discharge: HOME | End: 2017-11-10
Admitting: OTOLARYNGOLOGY

## 2017-11-10 ENCOUNTER — HOSPITAL ENCOUNTER (OUTPATIENT)
Dept: ULTRASOUND IMAGING | Facility: HOSPITAL | Age: 57
Discharge: HOME | End: 2017-11-10

## 2017-11-10 PROCEDURE — 76536 US EXAM OF HEAD AND NECK: CPT

## 2017-12-21 ENCOUNTER — OFFICE VISIT (OUTPATIENT)
Dept: OTOLARYNGOLOGY | Facility: CLINIC | Age: 57
End: 2017-12-21

## 2017-12-21 VITALS — BODY MASS INDEX: 27.95 KG/M2 | WEIGHT: 151.9 LBS | TEMPERATURE: 97.5 F | HEIGHT: 62 IN

## 2017-12-21 DIAGNOSIS — E04.2 MULTIPLE THYROID NODULES: Primary | ICD-10-CM

## 2017-12-21 PROCEDURE — 99213 OFFICE O/P EST LOW 20 MIN: CPT | Performed by: OTOLARYNGOLOGY

## 2017-12-21 NOTE — PROGRESS NOTES
Subjective   Marguerite Luz Maria Rob is a 57 y.o. female.   CC: f/u US thyroid    History of Present Illness   Follow-up ultrasound of thyroid patient has a history of thyroid nodules she's not have any neck pain sore throat swallowing or voice changes currently says she's doing relatively well with her general health.  Not having any new swallowing problems      The following portions of the patient's history were reviewed and updated as appropriate: allergies, current medications, past family history, past medical history, past social history, past surgical history and problem list.      Current Outpatient Prescriptions:   •  albuterol (PROVENTIL HFA;VENTOLIN HFA) 108 (90 BASE) MCG/ACT inhaler, Inhale 2 puffs Every 4 (Four) Hours As Needed for wheezing., Disp: , Rfl:   •  albuterol (PROVENTIL) (5 MG/ML) 0.5% nebulizer solution, Take 2.5 mg by nebulization Every 6 (Six) Hours As Needed for wheezing., Disp: , Rfl:   •  amitriptyline (ELAVIL) 100 MG tablet, Take 100 mg by mouth Every Night., Disp: , Rfl:   •  amLODIPine (NORVASC) 5 MG tablet, Take 5 mg by mouth Daily., Disp: , Rfl:   •  budesonide-formoterol (SYMBICORT) 160-4.5 MCG/ACT inhaler, Inhale 2 puffs 2 (Two) Times a Day., Disp: , Rfl:   •  cetirizine (zyrTEC) 10 MG tablet, Take 10 mg by mouth Daily., Disp: , Rfl:   •  famotidine (PEPCID) 40 MG tablet, Take 40 mg by mouth Daily., Disp: , Rfl:   •  fluconazole (DIFLUCAN) 100 MG tablet, Take 100 mg by mouth 1 (One) Time Per Week., Disp: , Rfl:   •  fluticasone (FLONASE) 50 MCG/ACT nasal spray, , Disp: , Rfl:   •  furosemide (LASIX) 20 MG tablet, Take 20 mg by mouth Daily., Disp: , Rfl:   •  gemfibrozil (LOPID) 600 MG tablet, Take 600 mg by mouth 2 (Two) Times a Day Before Meals., Disp: , Rfl:   •  HYDROcodone-acetaminophen (NORCO)  MG per tablet, Take 1 tablet by mouth 4 (Four) Times a Day., Disp: , Rfl:   •  JANUMET XR  MG tablet sustained-release 24 hour, Take  by mouth 2 (Two) Times a Day., Disp: ,  Rfl:   •  montelukast (SINGULAIR) 10 MG tablet, Take 1 tablet by mouth Every Night., Disp: 30 tablet, Rfl: 11  •  pantoprazole (PROTONIX) 40 MG EC tablet, Take 40 mg by mouth Daily., Disp: , Rfl:   •  potassium chloride (K-DUR,KLOR-CON) 10 MEQ CR tablet, Take 10 mEq by mouth 2 (Two) Times a Day., Disp: , Rfl:   •  pravastatin (PRAVACHOL) 20 MG tablet, Take 20 mg by mouth Every Night., Disp: , Rfl:   •  sennosides-docusate sodium (SENOKOT-S) 8.6-50 MG tablet, Take 2 tablets by mouth Every Night., Disp: , Rfl:     Allergies   Allergen Reactions   • Penicillins Rash            Review of Systems   Constitutional: Negative for fever.   HENT: Negative for sore throat, trouble swallowing and voice change.    Hematological: Negative for adenopathy.           Objective   Physical Exam   Constitutional: She is oriented to person, place, and time. She appears well-developed and well-nourished.   HENT:   Head: Normocephalic and atraumatic.   Right Ear: Hearing, tympanic membrane, external ear and ear canal normal.   Left Ear: Hearing, tympanic membrane, external ear and ear canal normal.   Nose: Nose normal. No mucosal edema, rhinorrhea, nasal deformity or septal deviation. No epistaxis. Right sinus exhibits no maxillary sinus tenderness and no frontal sinus tenderness. Left sinus exhibits no maxillary sinus tenderness and no frontal sinus tenderness.   Mouth/Throat: Uvula is midline and oropharynx is clear and moist. No trismus in the jaw. Normal dentition. No oropharyngeal exudate or posterior oropharyngeal edema.       Eyes: Conjunctivae are normal. Pupils are equal, round, and reactive to light.   Neck: Normal range of motion. Neck supple. No JVD present. No tracheal deviation present. No thyromegaly present.   Cardiovascular: Normal rate.    Pulmonary/Chest: Effort normal.   Musculoskeletal: Normal range of motion.   Lymphadenopathy:        Head (right side): No submental, no submandibular, no tonsillar, no preauricular,  no posterior auricular and no occipital adenopathy present.        Head (left side): No submental, no submandibular, no tonsillar, no preauricular, no posterior auricular and no occipital adenopathy present.     She has no cervical adenopathy.        Right cervical: No superficial cervical, no deep cervical and no posterior cervical adenopathy present.       Left cervical: No superficial cervical, no deep cervical and no posterior cervical adenopathy present.   Neurological: She is alert and oriented to person, place, and time. No cranial nerve deficit.   Skin: Skin is warm.   Psychiatric: She has a normal mood and affect. Her speech is normal and behavior is normal. Thought content normal.   Nursing note and vitals reviewed.   no palpable nodules  PROCEDURE: Neck sonogram     COMPARISON: No comparison     HISTORY: Nodule     FINDINGS: Realtime grayscale and color-flow imaging is obtained  of the thyroid gland. The thyroid is normal in size with uniform  echotexture. The right lobe measures 4.5 x 1.5 x 2.3 cm. The left  lobe measures 4.6 x 1.5 x 1.5 cm.     The mid right lobe contains a nodule measuring 4 x 3 x 3 mm.  Composition:mixed cystic and solid(1)  Echogenicity:hypoechoic(2)  Shape:wider-than-tall(0)  Margins:smooth(0)  Echogenic foci: none(0)  ACR TI-RADS points: 3; ACR TI-RADS risk category: TR3:Mildly  Suspicious     The mid left lobe contains a nodule measuring 4 x 2 x 4 mm.  Composition:mixed cystic and solid(1)  Echogenicity:hypoechoic(2)  Shape:wider-than-tall(0)  Margins:smooth(0)  Echogenic foci: none(0)  ACR TI-RADS points: 3; ACR TI-RADS risk category: TR3:Mildly  Suspicious     IMPRESSION:  Tiny bilateral cystic and solid nodules.  ACR TI-RADS TR3:Mildly Suspicious  Recommendation (see below): Based on small size, no follow-up  imaging is recommended.        ACR TI-RADS recommendations:     TR5 (>=7 points):  FNA if >=1cm  Follow-up if 0.5-0.9 cm every year for 5 years     TR4 (4-6 points):  FNA  if >= 1.5cm  Follow-up if 1-1.4 cm in 1, 2, 3 and 5 years     TR3 (3 points):  FNA if >= 2.5cm  Follow-up if 1.5-2.4 cm in 1, 3 and 5 years     Note: ACR TI-RADS recommends that no more than two nodules with  the highest total points should be biopsied, and that no more  than four nodules should be followed.     Electronically signed by:  Delfina Davis MD  11/10/2017 12:38 PM  CST Workstation: [id number]   Imaging   US thyroid (Order #[id number]) on 5/18/2017 - Imaging Information   Signed by   Signed Date/Time    Phone Pager   DELFINA DAVIS            Assessment/Plan   Marguerite was seen today for follow-up.    Diagnoses and all orders for this visit:    Multiple thyroid nodules  -     US Thyroid; Future    Patient is doing well and based on the exam and imaging I think we can recheck in 1 year she's going to problems or questions a culture in stable then can probably not necessary follow-up for several years she is agreement this will call for changes or problems

## 2018-01-08 ENCOUNTER — TELEPHONE (OUTPATIENT)
Dept: GASTROENTEROLOGY | Facility: CLINIC | Age: 58
End: 2018-01-08

## 2018-01-08 NOTE — TELEPHONE ENCOUNTER
"Patient left a voicemail on 12/28/17 complaining of rectal bleeding. Her call has been returned today. Patient states this has not happened since then. She also states this is an \"on again\" \"off again\" issue. Recommend patient let our office know if this happens again. Also encouraged the patient to report to the er if she is unable to reach us.   "

## 2018-01-18 ENCOUNTER — LAB (OUTPATIENT)
Dept: LAB | Facility: HOSPITAL | Age: 58
End: 2018-01-18

## 2018-01-18 DIAGNOSIS — K75.81 NASH (NONALCOHOLIC STEATOHEPATITIS): ICD-10-CM

## 2018-01-18 DIAGNOSIS — K21.00 GASTROESOPHAGEAL REFLUX DISEASE WITH ESOPHAGITIS: ICD-10-CM

## 2018-01-18 DIAGNOSIS — G89.29 CHRONIC ABDOMINAL PAIN: ICD-10-CM

## 2018-01-18 DIAGNOSIS — B18.2 CHRONIC HEPATITIS C WITHOUT HEPATIC COMA (HCC): ICD-10-CM

## 2018-01-18 DIAGNOSIS — R10.9 CHRONIC ABDOMINAL PAIN: ICD-10-CM

## 2018-01-18 LAB
ALBUMIN SERPL-MCNC: 4.6 G/DL (ref 3.4–4.8)
ALP SERPL-CCNC: 130 U/L (ref 38–126)
ALT SERPL W P-5'-P-CCNC: 43 U/L (ref 9–52)
AST SERPL-CCNC: 32 U/L (ref 14–36)
BILIRUB CONJ SERPL-MCNC: 0 MG/DL (ref 0–0.3)
BILIRUB INDIRECT SERPL-MCNC: 0.1 MG/DL (ref 0–1.1)
BILIRUB SERPL-MCNC: 0.2 MG/DL (ref 0.2–1.3)
PROT SERPL-MCNC: 7.9 G/DL (ref 6.3–8.6)

## 2018-01-18 PROCEDURE — 82947 ASSAY GLUCOSE BLOOD QUANT: CPT

## 2018-01-18 PROCEDURE — 84460 ALANINE AMINO (ALT) (SGPT): CPT

## 2018-01-18 PROCEDURE — 83883 ASSAY NEPHELOMETRY NOT SPEC: CPT

## 2018-01-18 PROCEDURE — 82977 ASSAY OF GGT: CPT

## 2018-01-18 PROCEDURE — 83010 ASSAY OF HAPTOGLOBIN QUANT: CPT

## 2018-01-18 PROCEDURE — 82247 BILIRUBIN TOTAL: CPT

## 2018-01-18 PROCEDURE — 84450 TRANSFERASE (AST) (SGOT): CPT

## 2018-01-18 PROCEDURE — 82172 ASSAY OF APOLIPOPROTEIN: CPT

## 2018-01-18 PROCEDURE — 80076 HEPATIC FUNCTION PANEL: CPT

## 2018-01-18 PROCEDURE — 84478 ASSAY OF TRIGLYCERIDES: CPT

## 2018-01-18 PROCEDURE — 82465 ASSAY BLD/SERUM CHOLESTEROL: CPT

## 2018-01-18 PROCEDURE — 36415 COLL VENOUS BLD VENIPUNCTURE: CPT

## 2018-01-23 LAB
A2 MACROGLOB SERPL-MCNC: 144 MG/DL (ref 110–276)
A2 MACROGLOB SERPL-MCNC: 157 MG/DL (ref 110–276)
ALT SERPL W P-5'-P-CCNC: 30 IU/L (ref 0–40)
ALT SERPL W P-5'-P-CCNC: 31 IU/L (ref 0–40)
APO A-I SERPL-MCNC: 110 MG/DL (ref 116–209)
APO A-I SERPL-MCNC: 112 MG/DL (ref 116–209)
AST SERPL W P-5'-P-CCNC: 30 IU/L (ref 0–40)
BILIRUB SERPL-MCNC: 0.2 MG/DL (ref 0–1.2)
BILIRUB SERPL-MCNC: 0.3 MG/DL (ref 0–1.2)
CHOLEST SERPL-MCNC: 158 MG/DL (ref 100–199)
FIBROSIS SCORING:: ABNORMAL
FIBROSIS SCORING:: ABNORMAL
FIBROSIS STAGE SERPL QL: ABNORMAL
FIBROSIS STAGE SERPL QL: ABNORMAL
GGT SERPL-CCNC: 16 IU/L (ref 0–60)
GGT SERPL-CCNC: 17 IU/L (ref 0–60)
GLUCOSE SERPL-MCNC: 147 MG/DL (ref 65–99)
HAPTOGLOB SERPL-MCNC: 145 MG/DL (ref 34–200)
HAPTOGLOB SERPL-MCNC: 156 MG/DL (ref 34–200)
HCV AB SER QL: ABNORMAL
INTERPRETATIONS: (REFERENCE): ABNORMAL
LABORATORY COMMENT REPORT: ABNORMAL
LABORATORY COMMENT REPORT: ABNORMAL
LIMITATIONS: (REFERENCE): ABNORMAL
LIMITATIONS:: ABNORMAL
LIVER FIBR SCORE SERPL CALC.FIBROSURE: 0.07 (ref 0–0.21)
LIVER FIBR SCORE SERPL CALC.FIBROSURE: 0.11 (ref 0–0.21)
NASH GRADE (REFERENCE): ABNORMAL
NASH SCORE (REFERENCE): 0.75
NASH SCORING (REFERENCE): ABNORMAL
NECROINFLAMM ACTIVITY SCORING:: ABNORMAL
NECROINFLAMMATORY ACT GRADE SERPL QL: ABNORMAL
NECROINFLAMMATORY ACT SCORE SERPL: 0.12 (ref 0–0.17)
STEATOSIS GRADE (REFERENCE): ABNORMAL
STEATOSIS GRADING (REFERENCE): ABNORMAL
STEATOSIS SCORE (REFERENCE): 0.74 (ref 0–0.3)
TRIGL SERPL-MCNC: 227 MG/DL (ref 0–149)
WEIGHT: (REFERENCE): 151 LBS

## 2018-01-24 ENCOUNTER — APPOINTMENT (OUTPATIENT)
Dept: LAB | Facility: HOSPITAL | Age: 58
End: 2018-01-24

## 2018-01-24 ENCOUNTER — OFFICE VISIT (OUTPATIENT)
Dept: GASTROENTEROLOGY | Facility: CLINIC | Age: 58
End: 2018-01-24

## 2018-01-24 VITALS
WEIGHT: 154.2 LBS | SYSTOLIC BLOOD PRESSURE: 122 MMHG | HEART RATE: 112 BPM | HEIGHT: 62 IN | DIASTOLIC BLOOD PRESSURE: 80 MMHG | BODY MASS INDEX: 28.37 KG/M2

## 2018-01-24 DIAGNOSIS — K21.00 GASTROESOPHAGEAL REFLUX DISEASE WITH ESOPHAGITIS: ICD-10-CM

## 2018-01-24 DIAGNOSIS — B18.2 CHRONIC HEPATITIS C WITHOUT HEPATIC COMA (HCC): Primary | ICD-10-CM

## 2018-01-24 DIAGNOSIS — K75.81 NASH (NONALCOHOLIC STEATOHEPATITIS): ICD-10-CM

## 2018-01-24 DIAGNOSIS — K62.5 HEMORRHAGE OF ANUS AND RECTUM: ICD-10-CM

## 2018-01-24 LAB
AMPHET+METHAMPHET UR QL: NEGATIVE
BARBITURATES UR QL SCN: NEGATIVE
BASOPHILS # BLD AUTO: 0.05 10*3/MM3 (ref 0–0.2)
BASOPHILS NFR BLD AUTO: 1 % (ref 0–2)
BENZODIAZ UR QL SCN: NEGATIVE
CANNABINOIDS SERPL QL: NEGATIVE
COCAINE UR QL: NEGATIVE
DEPRECATED RDW RBC AUTO: 41.4 FL (ref 36.4–46.3)
EOSINOPHIL # BLD AUTO: 0.13 10*3/MM3 (ref 0–0.7)
EOSINOPHIL NFR BLD AUTO: 2.5 % (ref 0–7)
ERYTHROCYTE [DISTWIDTH] IN BLOOD BY AUTOMATED COUNT: 14 % (ref 11.5–14.5)
ETHANOL BLD-MCNC: <10 MG/DL (ref 0–10)
ETHANOL UR QL: <0.01 %
HCT VFR BLD AUTO: 42.3 % (ref 35–45)
HGB BLD-MCNC: 14 G/DL (ref 12–15.5)
IMM GRANULOCYTES # BLD: 0.06 10*3/MM3 (ref 0–0.02)
IMM GRANULOCYTES NFR BLD: 1.1 % (ref 0–0.5)
LYMPHOCYTES # BLD AUTO: 1.99 10*3/MM3 (ref 0.6–4.2)
LYMPHOCYTES NFR BLD AUTO: 38 % (ref 10–50)
MCH RBC QN AUTO: 26.8 PG (ref 26.5–34)
MCHC RBC AUTO-ENTMCNC: 33.1 G/DL (ref 31.4–36)
MCV RBC AUTO: 81 FL (ref 80–98)
METHADONE UR QL SCN: NEGATIVE
MONOCYTES # BLD AUTO: 0.52 10*3/MM3 (ref 0–0.9)
MONOCYTES NFR BLD AUTO: 9.9 % (ref 0–12)
NEUTROPHILS # BLD AUTO: 2.48 10*3/MM3 (ref 2–8.6)
NEUTROPHILS NFR BLD AUTO: 47.5 % (ref 37–80)
NRBC BLD MANUAL-RTO: 0 /100 WBC (ref 0–0)
OPIATES UR QL: POSITIVE
OXYCODONE UR QL SCN: NEGATIVE
PLATELET # BLD AUTO: 222 10*3/MM3 (ref 150–450)
PMV BLD AUTO: 10.8 FL (ref 8–12)
RBC # BLD AUTO: 5.22 10*6/MM3 (ref 3.77–5.16)
WBC NRBC COR # BLD: 5.23 10*3/MM3 (ref 3.2–9.8)

## 2018-01-24 PROCEDURE — 80307 DRUG TEST PRSMV CHEM ANLYZR: CPT | Performed by: PHYSICIAN ASSISTANT

## 2018-01-24 PROCEDURE — 85025 COMPLETE CBC W/AUTO DIFF WBC: CPT | Performed by: PHYSICIAN ASSISTANT

## 2018-01-24 PROCEDURE — 99213 OFFICE O/P EST LOW 20 MIN: CPT | Performed by: PHYSICIAN ASSISTANT

## 2018-01-24 PROCEDURE — 87522 HEPATITIS C REVRS TRNSCRPJ: CPT | Performed by: PHYSICIAN ASSISTANT

## 2018-01-24 PROCEDURE — 36415 COLL VENOUS BLD VENIPUNCTURE: CPT | Performed by: PHYSICIAN ASSISTANT

## 2018-01-24 RX ORDER — HYDROCODONE BITARTRATE AND ACETAMINOPHEN 5; 325 MG/1; MG/1
1 TABLET ORAL EVERY 6 HOURS PRN
COMMUNITY
End: 2018-02-15

## 2018-01-24 NOTE — PROGRESS NOTES
Chief Complaint   Patient presents with   • Abdominal Pain   • Hepatitis C   • Torres       ENDO PROCEDURE ORDERED:    Subjective    Marguerite Rob is a 57 y.o. female. she is here today for follow-up.    History of Present Illness    HISTORY: Patient is seen on a recheck of her chronic active hepatitis C, abdominal pain, TORRES. Last seen 07/26/2017. Genotype 2b. F0. Patient is treatment naive. She denies drug and alcohol use. Her last ultrasound showed a fatty liver on 06/15/2017. The patient had thyroid ultrasound on 11/10/2017 that showed mixed nodules. She had laboratory on 01/18/2018: LFT showed an alkaline phosphatase 130, otherwise normal. TORRES FibroSure 0.07/F0, steatosis 0.74/S3, and 0.75/N2. Within the panel glucose 147, triglycerides 227. HCV FibroSure 0.11/F0, inflammation 0.12/A0. The laboratory did not draw her HCV RNA, safe report was completed. She had had a previous colonoscopy showing hemorrhoids in 2012. Her last colonoscopy on 02/08/2016 showed hemorrhoids. She states she has been having intermittent rectal bleeding, usually if she strains. She does take Senokot if needed. She is on Pepcid and Protonix for chronic heartburn and denied nausea, vomiting, or dysphagia. Weight is up 2.7 pounds since last visit.     ASSESSMENT/PLAN: GERD appears to be doing well on Protonix and Pepcid. Chronic active hepatitis C. I recommend that we try to get approval for 8 weeks of Mavyret. She is encouraged dietary modification and weight loss. We will need CBC, HCV RNA by PCR quantitative, urine drug screen, alcohol level to try to get her medications. Hepatoma screening is due in June. She states she already has Anusol hemorrhoidal cream. She is encouraged to increase the fiber in her diet. I discussed with her the need to reduce her acid-reducing medications if we are starting her on treatment. We will plan followup in 1 month, further pending clinical course and the results of the above.          The following  portions of the patient's history were reviewed and updated as appropriate:   Past Medical History:   Diagnosis Date   • Alcoholic fatty liver    • Alkaline phosphatase raised    • Anxiety    • Asthma     IgE-MEDIATED ALLERGIC ASTHMA   • Backache     CHRONIC   • CHF (congestive heart failure)    • Chronic hepatitis C     2b. Fibrosure .05/F0, necroinflam .14/A0. Repeat .05/F0, .13/A0      • Chronic neck pain    • Constipation    • COPD (chronic obstructive pulmonary disease)    • Diabetes    • Diarrhea    • Elevated levels of transaminase & lactic acid dehydrogenase    • Emphysema, unspecified    • Generalized abdominal pain    • GERD (gastroesophageal reflux disease)     WITH ESOPHAGITIS   • Hepatitis    • High risk sexual behavior    • Hypertension    • Hypokalemia    • Irritable bowel syndrome (IBS)    • Multiple joint pain    • Need for vaccination    • On long term drug therapy    • Shoulder pain    • Tobacco dependence syndrome      Past Surgical History:   Procedure Laterality Date   • APPENDECTOMY     • APPENDECTOMY     • BACK SURGERY     • CHOLECYSTECTOMY     • CHOLECYSTECTOMY     • COLONOSCOPY  02/08/2016   • COLONOSCOPY W/ POLYPECTOMY  02/08/2016    External and internal hemorrhoids.The examination was otherwise normal.Fluid aspiration performed.Several biopsies obtained in the entire colon.   • ENDOSCOPY  02/08/2016    Mildly severe esophagitis.Gastritis.Normal examined duodenum.Several biopsies obtained in the lower third of the esophagus.Several biopsies obtained in the gastric antrum.Several biopsies obtained in the first part of the duodenum.   • ENDOSCOPY AND COLONOSCOPY  08/13/2012    Internal & external hemorrhoids found. Scope could not pass into the TI.   • ENDOSCOPY W/ PEG TUBE PLACEMENT  08/13/2012    Esophagitis seen. Biopsy taken. Gastritis in stomach. Biopsy taken. Normal duodenum. Biopsy taken.   • HEMORRHOIDECTOMY     • HERNIA REPAIR     • INJECTION OF MEDICATION  08/01/2013     METHYLPREDNISONE, X2   • INJECTION OF MEDICATION  11/03/2011    KENALOG   • INJECTION OF MEDICATION  02/24/2011    ROCEPHIN   • TONSILLECTOMY AND ADENOIDECTOMY     • TOTAL ABDOMINAL HYSTERECTOMY WITH SALPINGO OOPHORECTOMY     • UPPER GASTROINTESTINAL ENDOSCOPY  02/08/2016     Family History   Problem Relation Age of Onset   • Coronary artery disease Mother    • Diabetes Mother    • Heart failure Mother    • Cancer Father    • Diabetes Father    • Heart failure Father    • Thyroid disease Father    • Breast cancer Sister    • Cancer Other      COLORECTAL   • Endometrial cancer Other    • Ovarian cancer Other    • Diabetes Brother      OB History     No data available        Allergies   Allergen Reactions   • Doxycycline    • Penicillins Rash     Social History     Social History   • Marital status:      Spouse name: N/A   • Number of children: N/A   • Years of education: N/A     Social History Main Topics   • Smoking status: Current Every Day Smoker     Packs/day: 0.25     Types: Cigarettes   • Smokeless tobacco: Never Used   • Alcohol use No   • Drug use: No   • Sexual activity: Defer     Other Topics Concern   • None     Social History Narrative       Current Outpatient Prescriptions:   •  albuterol (PROVENTIL HFA;VENTOLIN HFA) 108 (90 BASE) MCG/ACT inhaler, Inhale 2 puffs Every 4 (Four) Hours As Needed for wheezing., Disp: , Rfl:   •  albuterol (PROVENTIL) (5 MG/ML) 0.5% nebulizer solution, Take 2.5 mg by nebulization Every 6 (Six) Hours As Needed for wheezing., Disp: , Rfl:   •  amitriptyline (ELAVIL) 100 MG tablet, Take 100 mg by mouth Every Night., Disp: , Rfl:   •  amLODIPine (NORVASC) 5 MG tablet, Take 5 mg by mouth Daily., Disp: , Rfl:   •  budesonide-formoterol (SYMBICORT) 160-4.5 MCG/ACT inhaler, Inhale 2 puffs 2 (Two) Times a Day., Disp: , Rfl:   •  cetirizine (zyrTEC) 10 MG tablet, Take 10 mg by mouth Daily., Disp: , Rfl:   •  famotidine (PEPCID) 40 MG tablet, Take 40 mg by mouth Daily., Disp: ,  "Rfl:   •  fluconazole (DIFLUCAN) 100 MG tablet, Take 100 mg by mouth 1 (One) Time Per Week., Disp: , Rfl:   •  fluticasone (FLONASE) 50 MCG/ACT nasal spray, 1 spray into each nostril Daily., Disp: , Rfl:   •  furosemide (LASIX) 20 MG tablet, Take 20 mg by mouth Daily., Disp: , Rfl:   •  gemfibrozil (LOPID) 600 MG tablet, Take 600 mg by mouth 2 (Two) Times a Day Before Meals., Disp: , Rfl:   •  HYDROcodone-acetaminophen (NORCO) 5-325 MG per tablet, Take 1 tablet by mouth Every 6 (Six) Hours As Needed., Disp: , Rfl:   •  JANUMET XR  MG tablet sustained-release 24 hour, Take  by mouth 2 (Two) Times a Day., Disp: , Rfl:   •  montelukast (SINGULAIR) 10 MG tablet, Take 1 tablet by mouth Every Night., Disp: 30 tablet, Rfl: 11  •  pantoprazole (PROTONIX) 40 MG EC tablet, Take 40 mg by mouth Daily., Disp: , Rfl:   •  potassium chloride (K-DUR,KLOR-CON) 10 MEQ CR tablet, Take 10 mEq by mouth 2 (Two) Times a Day., Disp: , Rfl:   •  pravastatin (PRAVACHOL) 20 MG tablet, Take 20 mg by mouth Every Night., Disp: , Rfl:   •  sennosides-docusate sodium (SENOKOT-S) 8.6-50 MG tablet, Take 2 tablets by mouth Every Night., Disp: , Rfl:   •  Glecaprevir-Pibrentasvir 100-40 MG tablet, Take 3 tablets by mouth Daily., Disp: 84 tablet, Rfl: 1  Review of Systems  Review of Systems       Objective    /80 (BP Location: Left arm)  Pulse 112  Ht 157.4 cm (61.97\")  Wt 69.9 kg (154 lb 3.2 oz)  BMI 28.23 kg/m2  Physical Exam   Constitutional: She is oriented to person, place, and time. She appears well-developed and well-nourished. No distress.   HENT:   Head: Normocephalic and atraumatic.   Eyes: EOM are normal. Pupils are equal, round, and reactive to light.   Neck: Normal range of motion.   Cardiovascular: Normal rate, regular rhythm and normal heart sounds.    Pulmonary/Chest: Effort normal and breath sounds normal.   Abdominal: Soft. Bowel sounds are normal. She exhibits no shifting dullness, no distension, no abdominal bruit, " no ascites and no mass. There is no hepatosplenomegaly. There is tenderness. There is no rigidity, no rebound, no guarding and no CVA tenderness. No hernia. Hernia confirmed negative in the ventral area.   Mild diffuse   Musculoskeletal: Normal range of motion.   Neurological: She is alert and oriented to person, place, and time.   Skin: Skin is warm and dry.   Psychiatric: She has a normal mood and affect. Her behavior is normal. Judgment and thought content normal.   Nursing note and vitals reviewed.    Assessment/Plan      1. Chronic hepatitis C without hepatic coma    2. TORRES (nonalcoholic steatohepatitis)    3. Gastroesophageal reflux disease with esophagitis    4. Hemorrhage of anus and rectum    .   Marguerite was seen today for abdominal pain, hepatitis c and torres.    Diagnoses and all orders for this visit:    Chronic hepatitis C without hepatic coma  Comments:  2b/F0  Orders:  -     CBC Auto Differential  -     HCV RT-PCR,Quant(Non-Graph)  -     Urine Drug Screen - Urine, Clean Catch  -     Ethanol    TORRES (nonalcoholic steatohepatitis)  Comments:  F0/S3/N2  Orders:  -     CBC Auto Differential  -     HCV RT-PCR,Quant(Non-Graph)  -     Urine Drug Screen - Urine, Clean Catch  -     Ethanol    Gastroesophageal reflux disease with esophagitis  -     CBC Auto Differential  -     HCV RT-PCR,Quant(Non-Graph)  -     Urine Drug Screen - Urine, Clean Catch  -     Ethanol    Hemorrhage of anus and rectum  -     CBC Auto Differential  -     HCV RT-PCR,Quant(Non-Graph)  -     Urine Drug Screen - Urine, Clean Catch  -     Ethanol    Other orders  -     Discontinue: Glecaprevir-Pibrentasvir 100-40 MG tablet; Take 3 tablets by mouth Daily.  -     Glecaprevir-Pibrentasvir 100-40 MG tablet; Take 3 tablets by mouth Daily.        Orders placed during this encounter include:  Orders Placed This Encounter   Procedures   • CBC Auto Differential   • Urine Drug Screen - Urine, Clean Catch   • Ethanol       Medications prescribed:  New  Medications Ordered This Visit   Medications   • Glecaprevir-Pibrentasvir 100-40 MG tablet     Sig: Take 3 tablets by mouth Daily.     Dispense:  84 tablet     Refill:  1     Discontinued Medications       Reason for Discontinue    HYDROcodone-acetaminophen (NORCO)  MG per tablet Discontinued by another clinician        Requested Prescriptions     Signed Prescriptions Disp Refills   • Glecaprevir-Pibrentasvir 100-40 MG tablet 84 tablet 1     Sig: Take 3 tablets by mouth Daily.       Review and/or summary of lab tests, radiology, procedures, medications. Review and summary of old records and obtaining of history. The risks and benefits of my recommendations, as well as other treatment options were discussed with the patient today. Questions were answered.    Follow-up: Return in about 1 month (around 2/24/2018), or if symptoms worsen or fail to improve.     * Surgery not found *      This document has been electronically signed by Genaro Franco PA-C on January 26, 2018 3:26 PM      Results for orders placed or performed in visit on 01/24/18   HCV RT-PCR,Quant(Non-Graph)   Result Value Ref Range    Hepatitis C Quantitation [id number] IU/mL    HCV log10 6.342 log10 IU/mL    Test Information Comment    CBC Auto Differential   Result Value Ref Range    WBC 5.23 3.20 - 9.80 10*3/mm3    RBC 5.22 (H) 3.77 - 5.16 10*6/mm3    Hemoglobin 14.0 12.0 - 15.5 g/dL    Hematocrit 42.3 35.0 - 45.0 %    MCV 81.0 80.0 - 98.0 fL    MCH 26.8 26.5 - 34.0 pg    MCHC 33.1 31.4 - 36.0 g/dL    RDW 14.0 11.5 - 14.5 %    RDW-SD 41.4 36.4 - 46.3 fl    MPV 10.8 8.0 - 12.0 fL    Platelets 222 150 - 450 10*3/mm3    Neutrophil % 47.5 37.0 - 80.0 %    Lymphocyte % 38.0 10.0 - 50.0 %    Monocyte % 9.9 0.0 - 12.0 %    Eosinophil % 2.5 0.0 - 7.0 %    Basophil % 1.0 0.0 - 2.0 %    Immature Grans % 1.1 (H) 0.0 - 0.5 %    Neutrophils, Absolute 2.48 2.00 - 8.60 10*3/mm3    Lymphocytes, Absolute 1.99 0.60 - 4.20 10*3/mm3    Monocytes, Absolute 0.52  0.00 - 0.90 10*3/mm3    Eosinophils, Absolute 0.13 0.00 - 0.70 10*3/mm3    Basophils, Absolute 0.05 0.00 - 0.20 10*3/mm3    Immature Grans, Absolute 0.06 (H) 0.00 - 0.02 10*3/mm3    nRBC 0.0 0.0 - 0.0 /100 WBC   Urine Drug Screen - Urine, Clean Catch   Result Value Ref Range    Amphetamine Screen, Urine Negative Negative    Barbiturates Screen, Urine Negative Negative    Benzodiazepine Screen, Urine Negative Negative    Cocaine Screen, Urine Negative Negative    Methadone Screen, Urine Negative Negative    Opiate Screen Positive (A) Negative    Oxycodone Screen, Urine Negative Negative    THC, Screen, Urine Negative Negative   Ethanol   Result Value Ref Range    Ethanol <10 0 - 10 mg/dL    Ethanol % <0.010 %   Results for orders placed or performed in visit on 01/18/18   TORRES Fibrosure   Result Value Ref Range    Fibrosis Score (References) 0.07 0.00 - 0.21    Fibrosis Stage (Reference) Comment     Steatosis Score (Reference) 0.74 (H) 0.00 - 0.30    Steatosis Grade (Reference) Comment     TORRES Score (Reference) 0.75 0.25    Torres Grade (Reference) Comment     Height: (Reference) 62 Inches    Weight: (Reference) 151 LBS    Alpha 2-Macroglobulins, Qn 144 110 - 276 mg/dL    Haptoglobin 156 34 - 200 mg/dL    Apolipoprotein A-1 112 (L) 116 - 209 mg/dL    Total Bilirubin 0.2 0.0 - 1.2 mg/dL    GGT 17 0 - 60 IU/L    ALT (SGPT) 30 0 - 40 IU/L    AST (SGOT) P5P (Reference) 30 0 - 40 IU/L    Cholesterol, Total (Reference) 158 100 - 199 mg/dL    Glucose, Serum (Reference) 147 (H) 65 - 99 mg/dL    Triglycerides 227 (H) 0 - 149 mg/dL    Interpretations: (Reference) Comment     Fibrosis Scoring: Comment     Steatosis Grading (Reference) Comment     Torres Scoring (Reference) Comment     Limitations: (Reference) Comment     Comment (Reference) Comment    Results for orders placed or performed in visit on 01/18/18   HCV FibroSURE   Result Value Ref Range    Fibrosis Score 0.11 0.00 - 0.21    Fibrosis Stage Comment     Necroinflammat  Activity Score 0.12 0.00 - 0.17    Necroinflammat Activity Grade A0-No activity     Alpha 2-Macroglobulins, Qn 157 110 - 276 mg/dL    Haptoglobin 145 34 - 200 mg/dL    Apolipoprotein A-1 110 (L) 116 - 209 mg/dL    Total Bilirubin 0.3 0.0 - 1.2 mg/dL    GGT 16 0 - 60 IU/L    ALT (SGPT) 31 0 - 40 IU/L    HCV Qual Interp Comment     Fibrosis Scoring: Comment     Necroinflamm Activity Scoring: Comment     Limitations: Comment     Comment Comment    Results for orders placed or performed in visit on 01/18/18   Hepatic Function Panel   Result Value Ref Range    Total Protein 7.9 6.3 - 8.6 g/dL    Albumin 4.60 3.40 - 4.80 g/dL    ALT (SGPT) 43 9 - 52 U/L    AST (SGOT) 32 14 - 36 U/L    Alkaline Phosphatase 130 (H) 38 - 126 U/L    Total Bilirubin 0.2 0.2 - 1.3 mg/dL    Bilirubin, Direct 0.0 0.0 - 0.3 mg/dL    Bilirubin, Indirect 0.1 0.0 - 1.1 mg/dL   Results for orders placed or performed in visit on 07/17/17   AFP Tumor Marker   Result Value Ref Range    AFP Tumor Marker 2.4 0.0 - 8.3 ng/mL   Results for orders placed or performed in visit on 07/17/17   HCV FibroSURE   Result Value Ref Range    Fibrosis Score 0.07 0.00 - 0.21    Fibrosis Stage Comment     Necroinflammat Activity Score 0.08 0.00 - 0.17    Necroinflammat Activity Grade A0-No activity     Alpha 2-Macroglobulins, Qn 168 110 - 276 mg/dL    Haptoglobin 155 34 - 200 mg/dL    Apolipoprotein A-1 92 (L) 116 - 209 mg/dL    Total Bilirubin 0.1 0.0 - 1.2 mg/dL    GGT 15 0 - 60 IU/L    ALT (SGPT) 25 0 - 40 IU/L    HCV Qual Interp Comment     Fibrosis Scoring: Comment     Necroinflamm Activity Scoring: Comment     Limitations: Comment     Comment Comment      *Note: Due to a large number of results and/or encounters for the requested time period, some results have not been displayed. A complete set of results can be found in Results Review.       Some portions of this note have been dictated using voice recognition software and may contain errors and/or omissions.

## 2018-01-25 LAB
HCV RNA SERPL NAA+PROBE-ACNC: NORMAL IU/ML
HCV RNA SERPL NAA+PROBE-LOG IU: 6.34 LOG10 IU/ML
TEST INFORMATION: NORMAL

## 2018-02-07 ENCOUNTER — TELEPHONE (OUTPATIENT)
Dept: GASTROENTEROLOGY | Facility: CLINIC | Age: 58
End: 2018-02-07

## 2018-02-07 DIAGNOSIS — B18.2 CHRONIC HEPATITIS C WITHOUT HEPATIC COMA (HCC): Primary | ICD-10-CM

## 2018-02-07 NOTE — TELEPHONE ENCOUNTER
Patient has been contacted and made aware that her Rx for Mavyret has been approved. Her rx has to go through [facility] and that has been sent in for her. She will contact our office once she receives the medication. She is aware that she can not take any medication until she is seen in office first. Patient voiced understanding.

## 2018-02-15 ENCOUNTER — OFFICE VISIT (OUTPATIENT)
Dept: GASTROENTEROLOGY | Facility: CLINIC | Age: 58
End: 2018-02-15

## 2018-02-15 ENCOUNTER — OFFICE VISIT (OUTPATIENT)
Dept: PULMONOLOGY | Facility: CLINIC | Age: 58
End: 2018-02-15

## 2018-02-15 VITALS
SYSTOLIC BLOOD PRESSURE: 118 MMHG | DIASTOLIC BLOOD PRESSURE: 72 MMHG | HEART RATE: 114 BPM | WEIGHT: 156.8 LBS | HEIGHT: 62 IN | BODY MASS INDEX: 28.85 KG/M2

## 2018-02-15 VITALS
SYSTOLIC BLOOD PRESSURE: 118 MMHG | BODY MASS INDEX: 28.85 KG/M2 | OXYGEN SATURATION: 96 % | HEART RATE: 116 BPM | HEIGHT: 62 IN | DIASTOLIC BLOOD PRESSURE: 72 MMHG | WEIGHT: 156.8 LBS

## 2018-02-15 DIAGNOSIS — Z87.891 PERSONAL HISTORY OF TOBACCO USE, PRESENTING HAZARDS TO HEALTH: ICD-10-CM

## 2018-02-15 DIAGNOSIS — B18.2 CHRONIC HEPATITIS C WITHOUT HEPATIC COMA (HCC): Primary | ICD-10-CM

## 2018-02-15 DIAGNOSIS — K21.00 GASTROESOPHAGEAL REFLUX DISEASE WITH ESOPHAGITIS: ICD-10-CM

## 2018-02-15 DIAGNOSIS — J44.9 COPD WITH ASTHMA (HCC): Primary | ICD-10-CM

## 2018-02-15 DIAGNOSIS — J30.89 CHRONIC NON-SEASONAL ALLERGIC RHINITIS, UNSPECIFIED TRIGGER: ICD-10-CM

## 2018-02-15 DIAGNOSIS — F17.200 TOBACCO DEPENDENCE SYNDROME: ICD-10-CM

## 2018-02-15 DIAGNOSIS — K75.81 NASH (NONALCOHOLIC STEATOHEPATITIS): ICD-10-CM

## 2018-02-15 PROCEDURE — 99214 OFFICE O/P EST MOD 30 MIN: CPT | Performed by: INTERNAL MEDICINE

## 2018-02-15 PROCEDURE — G0296 VISIT TO DETERM LDCT ELIG: HCPCS | Performed by: INTERNAL MEDICINE

## 2018-02-15 PROCEDURE — 99213 OFFICE O/P EST LOW 20 MIN: CPT | Performed by: PHYSICIAN ASSISTANT

## 2018-02-15 PROCEDURE — 99406 BEHAV CHNG SMOKING 3-10 MIN: CPT | Performed by: INTERNAL MEDICINE

## 2018-02-15 RX ORDER — HYDROCODONE BITARTRATE AND ACETAMINOPHEN 7.5; 325 MG/1; MG/1
1 TABLET ORAL 3 TIMES DAILY
COMMUNITY
End: 2019-05-14 | Stop reason: HOSPADM

## 2018-02-15 RX ORDER — BENZONATATE 100 MG/1
100 CAPSULE ORAL 3 TIMES DAILY PRN
COMMUNITY
End: 2019-04-30

## 2018-02-15 RX ORDER — OMEPRAZOLE 20 MG/1
20 CAPSULE, DELAYED RELEASE ORAL DAILY
Qty: 30 CAPSULE | Refills: 1 | Status: SHIPPED | OUTPATIENT
Start: 2018-02-15 | End: 2018-08-02

## 2018-02-15 RX ORDER — DIPHENOXYLATE HYDROCHLORIDE AND ATROPINE SULFATE 2.5; .025 MG/1; MG/1
1 TABLET ORAL 4 TIMES DAILY PRN
COMMUNITY
End: 2019-04-30

## 2018-02-15 NOTE — PROGRESS NOTES
Chief Complaint   Patient presents with   • Hepatitis C     first visit to start 8 weeks Mavyret       ENDO PROCEDURE ORDERED:    Subjective    Marguerite Rob is a 57 y.o. female. she is here today for follow-up.    History of Present Illness    SUBJECTIVE:  The patient was seen on a recheck of her chronic hepatitis C, GERD, abdominal pain.  Last seen 01/24/2018.  Genotype 2B, F0.  The patient is treatment naive.  She agrees to be compliant with treatment protocol.  She has been approved for 8 weeks treatment of Mavyret.  Studies from last visit show negative alcohol level, fairly normal CBC.  Urine drug screen positive for opiates.  HCV RNA by PCR quantitative was 2,200,000 international units per mL, equivalent to 6.342 log international units.  The patient states she is to have a knee replacement in [place].  She states she is going to be going to a new pain clinic.  She is concerned about any interactions with the medications.  We had a long discussion of potential drug interactions.  She will stop the Pepcid and will decrease her Protonix to 20 mg daily.  I cautioned her not to take the medication with her cholesterol medications.  She denied nausea, vomiting, dysphagia.  Bowels are moving without blood.  Weight is up 2.5 pounds since last visit.  Last colonoscopy 02/08/2016.    ASSESSMENT/PLAN:  Patient with chronic GERD, epigastric pain.  We will try decreasing her Protonix to 20 mg daily.  She is ready to begin 8 weeks treatment with Mavyret.  I discussed the risks, benefits, alternatives, potential drug interactions, the importance of following protocol and getting her laboratories every 2 weeks.  She agrees to be compliant.  We will plan follow up at 5 weeks.  She will be due for hepatoma screening in June 2018.       The following portions of the patient's history were reviewed and updated as appropriate:   Past Medical History:   Diagnosis Date   • Alcoholic fatty liver    • Alkaline phosphatase  raised    • Anxiety    • Asthma     IgE-MEDIATED ALLERGIC ASTHMA   • Backache     CHRONIC   • CHF (congestive heart failure)    • Chronic hepatitis C     2b. Fibrosure .05/F0, necroinflam .14/A0. Repeat .05/F0, .13/A0      • Chronic neck pain    • Constipation    • COPD (chronic obstructive pulmonary disease)    • Diabetes    • Diarrhea    • Elevated levels of transaminase & lactic acid dehydrogenase    • Emphysema, unspecified    • Generalized abdominal pain    • GERD (gastroesophageal reflux disease)     WITH ESOPHAGITIS   • Hepatitis    • High risk sexual behavior    • Hypertension    • Hypokalemia    • Irritable bowel syndrome (IBS)    • Multiple joint pain    • Need for vaccination    • On long term drug therapy    • Shoulder pain    • Tobacco dependence syndrome      Past Surgical History:   Procedure Laterality Date   • APPENDECTOMY     • APPENDECTOMY     • BACK SURGERY     • CHOLECYSTECTOMY     • CHOLECYSTECTOMY     • COLONOSCOPY  02/08/2016   • COLONOSCOPY W/ POLYPECTOMY  02/08/2016    External and internal hemorrhoids.The examination was otherwise normal.Fluid aspiration performed.Several biopsies obtained in the entire colon.   • ENDOSCOPY  02/08/2016    Mildly severe esophagitis.Gastritis.Normal examined duodenum.Several biopsies obtained in the lower third of the esophagus.Several biopsies obtained in the gastric antrum.Several biopsies obtained in the first part of the duodenum.   • ENDOSCOPY AND COLONOSCOPY  08/13/2012    Internal & external hemorrhoids found. Scope could not pass into the TI.   • ENDOSCOPY W/ PEG TUBE PLACEMENT  08/13/2012    Esophagitis seen. Biopsy taken. Gastritis in stomach. Biopsy taken. Normal duodenum. Biopsy taken.   • HEMORRHOIDECTOMY     • HERNIA REPAIR     • INJECTION OF MEDICATION  08/01/2013    METHYLPREDNISONE, X2   • INJECTION OF MEDICATION  11/03/2011    KENALOG   • INJECTION OF MEDICATION  02/24/2011    ROCEPHIN   • TONSILLECTOMY AND ADENOIDECTOMY     • TOTAL  ABDOMINAL HYSTERECTOMY WITH SALPINGO OOPHORECTOMY     • UPPER GASTROINTESTINAL ENDOSCOPY  02/08/2016     Family History   Problem Relation Age of Onset   • Coronary artery disease Mother    • Diabetes Mother    • Heart failure Mother    • Cancer Father    • Diabetes Father    • Heart failure Father    • Thyroid disease Father    • Breast cancer Sister    • Cancer Other      COLORECTAL   • Endometrial cancer Other    • Ovarian cancer Other    • Diabetes Brother      OB History     No data available        Allergies   Allergen Reactions   • Doxycycline Itching   • Penicillins Rash     Social History     Social History   • Marital status:      Spouse name: N/A   • Number of children: N/A   • Years of education: N/A     Social History Main Topics   • Smoking status: Current Every Day Smoker     Packs/day: 0.25     Types: Cigarettes   • Smokeless tobacco: Never Used   • Alcohol use No   • Drug use: No   • Sexual activity: Defer     Other Topics Concern   • None     Social History Narrative       Current Outpatient Prescriptions:   •  albuterol (PROVENTIL HFA;VENTOLIN HFA) 108 (90 BASE) MCG/ACT inhaler, Inhale 2 puffs Every 4 (Four) Hours As Needed for wheezing., Disp: , Rfl:   •  albuterol (PROVENTIL) (5 MG/ML) 0.5% nebulizer solution, Take 2.5 mg by nebulization Every 6 (Six) Hours As Needed for wheezing., Disp: , Rfl:   •  amitriptyline (ELAVIL) 100 MG tablet, Take 100 mg by mouth Every Night., Disp: , Rfl:   •  amLODIPine (NORVASC) 5 MG tablet, Take 5 mg by mouth Daily., Disp: , Rfl:   •  benzonatate (TESSALON) 100 MG capsule, Take 100 mg by mouth 3 (Three) Times a Day As Needed for Cough., Disp: , Rfl:   •  budesonide-formoterol (SYMBICORT) 160-4.5 MCG/ACT inhaler, Inhale 2 puffs 2 (Two) Times a Day., Disp: , Rfl:   •  cetirizine (zyrTEC) 10 MG tablet, Take 10 mg by mouth Daily., Disp: , Rfl:   •  diphenoxylate-atropine (LOMOTIL) 2.5-0.025 MG per tablet, Take 1 tablet by mouth 4 (Four) Times a Day As Needed  "for Diarrhea., Disp: , Rfl:   •  fluconazole (DIFLUCAN) 100 MG tablet, Take 100 mg by mouth 1 (One) Time Per Week., Disp: , Rfl:   •  fluticasone (FLONASE) 50 MCG/ACT nasal spray, 1 spray into each nostril Daily., Disp: , Rfl:   •  furosemide (LASIX) 20 MG tablet, Take 20 mg by mouth Daily., Disp: , Rfl:   •  gemfibrozil (LOPID) 600 MG tablet, Take 600 mg by mouth 2 (Two) Times a Day Before Meals., Disp: , Rfl:   •  Glecaprevir-Pibrentasvir 100-40 MG tablet, Take 3 tablets by mouth Daily., Disp: 84 tablet, Rfl: 1  •  HYDROcodone-acetaminophen (NORCO) 7.5-325 MG per tablet, Take 1 tablet by mouth 3 (Three) Times a Day., Disp: , Rfl:   •  JANUMET XR  MG tablet sustained-release 24 hour, Take  by mouth 2 (Two) Times a Day., Disp: , Rfl:   •  montelukast (SINGULAIR) 10 MG tablet, Take 1 tablet by mouth Every Night., Disp: 30 tablet, Rfl: 11  •  potassium chloride (K-DUR,KLOR-CON) 10 MEQ CR tablet, Take 10 mEq by mouth 2 (Two) Times a Day., Disp: , Rfl:   •  pravastatin (PRAVACHOL) 20 MG tablet, Take 20 mg by mouth Every Night., Disp: , Rfl:   •  sennosides-docusate sodium (SENOKOT-S) 8.6-50 MG tablet, Take 2 tablets by mouth Every Night., Disp: , Rfl:   •  omeprazole (priLOSEC) 20 MG capsule, Take 1 capsule by mouth Daily., Disp: 30 capsule, Rfl: 1  Review of Systems  Review of Systems       Objective    /72 (BP Location: Left arm)  Pulse 114  Ht 157.4 cm (61.97\")  Wt 71.1 kg (156 lb 12.8 oz)  BMI 28.71 kg/m2  Physical Exam   Constitutional: She is oriented to person, place, and time. She appears well-developed and well-nourished. No distress.   HENT:   Head: Normocephalic and atraumatic.   Eyes: EOM are normal. Pupils are equal, round, and reactive to light.   Neck: Normal range of motion.   Cardiovascular: Normal rate, regular rhythm and normal heart sounds.    Pulmonary/Chest: Effort normal and breath sounds normal.   Abdominal: Soft. Bowel sounds are normal. She exhibits no shifting dullness, no " distension, no abdominal bruit, no ascites and no mass. There is no hepatosplenomegaly. There is tenderness. There is no rigidity, no rebound, no guarding and no CVA tenderness. No hernia. Hernia confirmed negative in the ventral area.   Mild diffuse   Musculoskeletal: Normal range of motion.   Neurological: She is alert and oriented to person, place, and time.   Skin: Skin is warm and dry.   Psychiatric: She has a normal mood and affect. Her behavior is normal. Judgment and thought content normal.   Nursing note and vitals reviewed.    Assessment/Plan      1. Chronic hepatitis C without hepatic coma    2. TORRES (nonalcoholic steatohepatitis)    3. Gastroesophageal reflux disease with esophagitis    .   Marguerite was seen today for hepatitis c.    Diagnoses and all orders for this visit:    Chronic hepatitis C without hepatic coma  Comments:  2b/F0  Orders:  -     CBC Auto Differential; Future  -     Comprehensive Metabolic Panel; Future  -     CBC Auto Differential; Future  -     Comprehensive Metabolic Panel; Future  -     TSH; Future  -     HCV RT-PCR,Quant(Non-Graph); Future    TORRES (nonalcoholic steatohepatitis)    Gastroesophageal reflux disease with esophagitis    Other orders  -     omeprazole (priLOSEC) 20 MG capsule; Take 1 capsule by mouth Daily.        Orders placed during this encounter include:  Orders Placed This Encounter   Procedures   • CBC Auto Differential     Due 3/2/18     Standing Status:   Future     Standing Expiration Date:   3/9/2018   • Comprehensive Metabolic Panel     Due 3/2/18     Standing Status:   Future     Standing Expiration Date:   3/9/2018   • CBC Auto Differential     3/16/18     Standing Status:   Future     Standing Expiration Date:   3/23/2018   • Comprehensive Metabolic Panel     3/16/18     Standing Status:   Future     Standing Expiration Date:   3/23/2018   • TSH     3/16/18     Standing Status:   Future     Standing Expiration Date:   3/23/2018       Medications  prescribed:  New Medications Ordered This Visit   Medications   • omeprazole (priLOSEC) 20 MG capsule     Sig: Take 1 capsule by mouth Daily.     Dispense:  30 capsule     Refill:  1     Discontinued Medications       Reason for Discontinue    HYDROcodone-acetaminophen (NORCO) 5-325 MG per tablet Cost of medication        Requested Prescriptions     Signed Prescriptions Disp Refills   • omeprazole (priLOSEC) 20 MG capsule 30 capsule 1     Sig: Take 1 capsule by mouth Daily.       Review and/or summary of lab tests, radiology, procedures, medications. Review and summary of old records and obtaining of history. The risks and benefits of my recommendations, as well as other treatment options were discussed with the patient today. Questions were answered.    Follow-up: Return in about 5 weeks (around 3/22/2018), or if symptoms worsen or fail to improve, for After the above.     * Surgery not found *      This document has been electronically signed by Genaro Franco PA-C on February 18, 2018 7:00 PM      Results for orders placed or performed in visit on 01/24/18   HCV RT-PCR,Quant(Non-Graph)   Result Value Ref Range    Hepatitis C Quantitation [id number] IU/mL    HCV log10 6.342 log10 IU/mL    Test Information Comment    CBC Auto Differential   Result Value Ref Range    WBC 5.23 3.20 - 9.80 10*3/mm3    RBC 5.22 (H) 3.77 - 5.16 10*6/mm3    Hemoglobin 14.0 12.0 - 15.5 g/dL    Hematocrit 42.3 35.0 - 45.0 %    MCV 81.0 80.0 - 98.0 fL    MCH 26.8 26.5 - 34.0 pg    MCHC 33.1 31.4 - 36.0 g/dL    RDW 14.0 11.5 - 14.5 %    RDW-SD 41.4 36.4 - 46.3 fl    MPV 10.8 8.0 - 12.0 fL    Platelets 222 150 - 450 10*3/mm3    Neutrophil % 47.5 37.0 - 80.0 %    Lymphocyte % 38.0 10.0 - 50.0 %    Monocyte % 9.9 0.0 - 12.0 %    Eosinophil % 2.5 0.0 - 7.0 %    Basophil % 1.0 0.0 - 2.0 %    Immature Grans % 1.1 (H) 0.0 - 0.5 %    Neutrophils, Absolute 2.48 2.00 - 8.60 10*3/mm3    Lymphocytes, Absolute 1.99 0.60 - 4.20 10*3/mm3    Monocytes,  Absolute 0.52 0.00 - 0.90 10*3/mm3    Eosinophils, Absolute 0.13 0.00 - 0.70 10*3/mm3    Basophils, Absolute 0.05 0.00 - 0.20 10*3/mm3    Immature Grans, Absolute 0.06 (H) 0.00 - 0.02 10*3/mm3    nRBC 0.0 0.0 - 0.0 /100 WBC   Urine Drug Screen - Urine, Clean Catch   Result Value Ref Range    Amphetamine Screen, Urine Negative Negative    Barbiturates Screen, Urine Negative Negative    Benzodiazepine Screen, Urine Negative Negative    Cocaine Screen, Urine Negative Negative    Methadone Screen, Urine Negative Negative    Opiate Screen Positive (A) Negative    Oxycodone Screen, Urine Negative Negative    THC, Screen, Urine Negative Negative   Ethanol   Result Value Ref Range    Ethanol <10 0 - 10 mg/dL    Ethanol % <0.010 %   Results for orders placed or performed in visit on 01/18/18   TORRES Fibrosure   Result Value Ref Range    Fibrosis Score (References) 0.07 0.00 - 0.21    Fibrosis Stage (Reference) Comment     Steatosis Score (Reference) 0.74 (H) 0.00 - 0.30    Steatosis Grade (Reference) Comment     TORRES Score (Reference) 0.75 0.25    Torres Grade (Reference) Comment     Height: (Reference) 62 Inches    Weight: (Reference) 151 LBS    Alpha 2-Macroglobulins, Qn 144 110 - 276 mg/dL    Haptoglobin 156 34 - 200 mg/dL    Apolipoprotein A-1 112 (L) 116 - 209 mg/dL    Total Bilirubin 0.2 0.0 - 1.2 mg/dL    GGT 17 0 - 60 IU/L    ALT (SGPT) 30 0 - 40 IU/L    AST (SGOT) P5P (Reference) 30 0 - 40 IU/L    Cholesterol, Total (Reference) 158 100 - 199 mg/dL    Glucose, Serum (Reference) 147 (H) 65 - 99 mg/dL    Triglycerides 227 (H) 0 - 149 mg/dL    Interpretations: (Reference) Comment     Fibrosis Scoring: Comment     Steatosis Grading (Reference) Comment     Torres Scoring (Reference) Comment     Limitations: (Reference) Comment     Comment (Reference) Comment    Results for orders placed or performed in visit on 01/18/18   HCV FibroSURE   Result Value Ref Range    Fibrosis Score 0.11 0.00 - 0.21    Fibrosis Stage Comment      Necroinflammat Activity Score 0.12 0.00 - 0.17    Necroinflammat Activity Grade A0-No activity     Alpha 2-Macroglobulins, Qn 157 110 - 276 mg/dL    Haptoglobin 145 34 - 200 mg/dL    Apolipoprotein A-1 110 (L) 116 - 209 mg/dL    Total Bilirubin 0.3 0.0 - 1.2 mg/dL    GGT 16 0 - 60 IU/L    ALT (SGPT) 31 0 - 40 IU/L    HCV Qual Interp Comment     Fibrosis Scoring: Comment     Necroinflamm Activity Scoring: Comment     Limitations: Comment     Comment Comment    Results for orders placed or performed in visit on 01/18/18   Hepatic Function Panel   Result Value Ref Range    Total Protein 7.9 6.3 - 8.6 g/dL    Albumin 4.60 3.40 - 4.80 g/dL    ALT (SGPT) 43 9 - 52 U/L    AST (SGOT) 32 14 - 36 U/L    Alkaline Phosphatase 130 (H) 38 - 126 U/L    Total Bilirubin 0.2 0.2 - 1.3 mg/dL    Bilirubin, Direct 0.0 0.0 - 0.3 mg/dL    Bilirubin, Indirect 0.1 0.0 - 1.1 mg/dL   Results for orders placed or performed in visit on 07/17/17   AFP Tumor Marker   Result Value Ref Range    AFP Tumor Marker 2.4 0.0 - 8.3 ng/mL   Results for orders placed or performed in visit on 07/17/17   HCV FibroSURE   Result Value Ref Range    Fibrosis Score 0.07 0.00 - 0.21    Fibrosis Stage Comment     Necroinflammat Activity Score 0.08 0.00 - 0.17    Necroinflammat Activity Grade A0-No activity     Alpha 2-Macroglobulins, Qn 168 110 - 276 mg/dL    Haptoglobin 155 34 - 200 mg/dL    Apolipoprotein A-1 92 (L) 116 - 209 mg/dL    Total Bilirubin 0.1 0.0 - 1.2 mg/dL    GGT 15 0 - 60 IU/L    ALT (SGPT) 25 0 - 40 IU/L    HCV Qual Interp Comment     Fibrosis Scoring: Comment     Necroinflamm Activity Scoring: Comment     Limitations: Comment     Comment Comment      *Note: Due to a large number of results and/or encounters for the requested time period, some results have not been displayed. A complete set of results can be found in Results Review.       Some portions of this note have been dictated using voice recognition software and may contain errors and/or  omissions.

## 2018-02-15 NOTE — PROGRESS NOTES
Pulmonary Office Follow-up    Subjective     Marguerite Rob is seen today at the office for   Chief Complaint   Patient presents with   • Follow-up     1 year         HPI  Marguerite Rob is a 57 y.o. female with a PMH significant for COPD/ asthma, tobacco use, and CHF who presents for follow-up of COPD. she was last seen on 2/23/17, at which time I recommended continuing Symbicort, albuterol, Flonase, and changing Claritin to zyrtec.  I also recommended annual LD CT screen to resume and February 2018.  Unfortunately, she was lost to follow-up and is just presenting now a year later.  She states since her last visit that she continues to do well on Symbicort and has not required her albuterol.  She has been able to cut back on her smoking is currently smoking 0.5ppd.  She had the patch but when she uses it made her sick though she believes it may have been due to smoking while using the patch.  Her allergies are much better since changing Claritin to Zyrtec and she continues on Flonase as well.  Patient denies any recent exacerbations or steroid use.  She is about to begin a new antiviral therapy for her chronic hepatitis C at the direction of GI.      Patient Active Problem List   Diagnosis   • COPD (chronic obstructive pulmonary disease)   • Tobacco dependence syndrome   • Lung nodule   • Chronic allergic rhinitis   • COPD with asthma   • Chest pain   • Dyspnea   • Benign essential HTN   • Mixed dyslipidemia   • TORRES (nonalcoholic steatohepatitis)   • GERD with esophagitis   • DM (diabetes mellitus)   • Mold exposure   • Smoker       Review of Systems  Review of Systems   Constitutional: Negative for fatigue.   HENT: Negative for congestion, postnasal drip and rhinorrhea.    Respiratory: Positive for cough and shortness of breath. Negative for wheezing.    Cardiovascular: Negative for leg swelling.   Gastrointestinal: Positive for abdominal pain.   Musculoskeletal: Positive for back pain.     As described in  the HPI. Otherwise, remainder of ROS (14 systems) were negative.    Medications, Allergies, Social, and Family Histories reviewed as per EMR.    Objective     Vitals:    02/15/18 1033   BP: 118/72   Pulse: 116   SpO2: 96%     Physical Exam   Constitutional: She is oriented to person, place, and time. Vital signs are normal. She appears well-developed and well-nourished.   HENT:   Head: Normocephalic and atraumatic.   Nose: No mucosal edema.   Mouth/Throat: Uvula is midline, oropharynx is clear and moist and mucous membranes are normal.   Mallampati 2   Eyes: Conjunctivae, EOM and lids are normal. Pupils are equal, round, and reactive to light.   Neck: Trachea normal and normal range of motion. No tracheal tenderness present. No thyroid mass present.   Cardiovascular: Normal rate, regular rhythm and normal heart sounds.  Exam reveals no gallop.    No murmur heard.  Pulmonary/Chest: Effort normal. No respiratory distress. She has no wheezes. She has no rhonchi. She exhibits no tenderness.   Abdominal: Soft. Normal appearance and bowel sounds are normal. She exhibits distension. There is no hepatosplenomegaly. There is no tenderness.   Musculoskeletal:   Normal gait, no extremity edema       Vascular Status -  Her exam exhibits no right foot edema. Her exam exhibits no left foot edema.  Lymphadenopathy:        Head (right side): No submandibular adenopathy present.        Head (left side): No submandibular adenopathy present.     She has no cervical adenopathy.        Right: No supraclavicular adenopathy present.        Left: No supraclavicular adenopathy present.   Neurological: She is alert and oriented to person, place, and time.   Skin: Skin is warm and dry.   Psychiatric: She has a normal mood and affect. Her speech is normal and behavior is normal. Judgment normal.   Nursing note and vitals reviewed.    IMAGING: CT chest 2/13/17 (independently reviewed and interpreted by me) showed 4mm RLL subpleural nodule and  scattered lower lobe blebs as well as a fatty liver.      Assessment/Plan     Marguerite was seen today for follow-up.    Diagnoses and all orders for this visit:    COPD with asthma    Tobacco dependence syndrome    Chronic non-seasonal allergic rhinitis, unspecified trigger    Personal history of tobacco use, presenting hazards to health  -     CT Chest Low Dose Wo; Future         Discussion/ Recommendations:   She continues to do well on Symbicort as maintenance with infrequent albuterol need.  Unfortunately, she does continue to smoke but is committed to complete cessation because she is taking care of her grandkids.  Her allergies are better controlled on her current regimen.    -Continue Symbicort BID and albuterol prn  -Continue Flonase, singulair and zyrtec.  -Encouraged frequent nasal saline.   -The patient meets criteria for lung cancer screening CT (LDCT).  Reviewed benefits of such screening including, early detection of potentially curable lung cancer.  Also, discussed risks of screening including, radiation exposure, test anxiety, false positives, risk associated with future procedures, and possibility of clinically significant incidental findings.  The patient does agree to undergo lung cancer screening.  I did inform her that it is possible her Medicaid plan may not cover and she understood.   -Again, encouraged complete tobacco cessation.  Recommended that she pick a quit date and start using the nicotine patch.  Offered support.  Spent 3mins.    She is to call approximately 2 days following her CT to receive results.    Return in about 6 months (around 8/15/2018) for Recheck.          This document has been electronically signed by Denisse Marcano MD on February 15, 2018 10:49 AM      EMR Dragon/Transcription disclaimer:     Much of this encounter note is an electronic transcription/translation of spoken language to printed text. The electronic translation of spoken language may permit erroneous, or at  times, nonsensical words or phrases to be inadvertently transcribed; Although I have reviewed the note for such errors, some may still exist.

## 2018-02-16 ENCOUNTER — DOCUMENTATION (OUTPATIENT)
Dept: CARDIOLOGY | Facility: CLINIC | Age: 58
End: 2018-02-16

## 2018-02-20 ENCOUNTER — TELEPHONE (OUTPATIENT)
Dept: GASTROENTEROLOGY | Facility: CLINIC | Age: 58
End: 2018-02-20

## 2018-02-20 NOTE — TELEPHONE ENCOUNTER
----- Message from Genaro Franco PA-C sent at 2/20/2018 10:12 AM CST -----  She can take fish oil. The patient wanted to stop the lopid. She was taking all her meds together. She shouldn't take the lopid with any other meds  ----- Message -----     From: Talia Gustafson MA     Sent: 2/20/2018   9:38 AM       To: LEMUEL Roman from Dr. Hdz's office called asking if the patient was supposed to stop her lopid and pravastatin and if so could she take fish oil while taking Mavyret?

## 2018-03-02 ENCOUNTER — LAB (OUTPATIENT)
Dept: LAB | Facility: HOSPITAL | Age: 58
End: 2018-03-02

## 2018-03-02 DIAGNOSIS — B18.2 CHRONIC HEPATITIS C WITHOUT HEPATIC COMA (HCC): ICD-10-CM

## 2018-03-02 LAB
ALBUMIN SERPL-MCNC: 4.5 G/DL (ref 3.4–4.8)
ALBUMIN/GLOB SERPL: 1.4 G/DL (ref 1.1–1.8)
ALP SERPL-CCNC: 122 U/L (ref 38–126)
ALT SERPL W P-5'-P-CCNC: 49 U/L (ref 9–52)
ANION GAP SERPL CALCULATED.3IONS-SCNC: 17 MMOL/L (ref 5–15)
AST SERPL-CCNC: 23 U/L (ref 14–36)
BASOPHILS # BLD AUTO: 0.05 10*3/MM3 (ref 0–0.2)
BASOPHILS NFR BLD AUTO: 0.7 % (ref 0–2)
BILIRUB SERPL-MCNC: 0.3 MG/DL (ref 0.2–1.3)
BUN BLD-MCNC: 12 MG/DL (ref 7–21)
BUN/CREAT SERPL: 24.5 (ref 7–25)
CALCIUM SPEC-SCNC: 9.8 MG/DL (ref 8.4–10.2)
CHLORIDE SERPL-SCNC: 99 MMOL/L (ref 95–110)
CO2 SERPL-SCNC: 24 MMOL/L (ref 22–31)
CREAT BLD-MCNC: 0.49 MG/DL (ref 0.5–1)
DEPRECATED RDW RBC AUTO: 38 FL (ref 36.4–46.3)
EOSINOPHIL # BLD AUTO: 0.25 10*3/MM3 (ref 0–0.7)
EOSINOPHIL NFR BLD AUTO: 3.6 % (ref 0–7)
ERYTHROCYTE [DISTWIDTH] IN BLOOD BY AUTOMATED COUNT: 13.4 % (ref 11.5–14.5)
GFR SERPL CREATININE-BSD FRML MDRD: 130 ML/MIN/1.73 (ref 60–120)
GLOBULIN UR ELPH-MCNC: 3.2 GM/DL (ref 2.3–3.5)
GLUCOSE BLD-MCNC: 145 MG/DL (ref 60–100)
HCT VFR BLD AUTO: 40 % (ref 35–45)
HGB BLD-MCNC: 13.4 G/DL (ref 12–15.5)
IMM GRANULOCYTES # BLD: 0.04 10*3/MM3 (ref 0–0.02)
IMM GRANULOCYTES NFR BLD: 0.6 % (ref 0–0.5)
LYMPHOCYTES # BLD AUTO: 1.76 10*3/MM3 (ref 0.6–4.2)
LYMPHOCYTES NFR BLD AUTO: 25.3 % (ref 10–50)
MCH RBC QN AUTO: 26.4 PG (ref 26.5–34)
MCHC RBC AUTO-ENTMCNC: 33.5 G/DL (ref 31.4–36)
MCV RBC AUTO: 78.9 FL (ref 80–98)
MONOCYTES # BLD AUTO: 0.45 10*3/MM3 (ref 0–0.9)
MONOCYTES NFR BLD AUTO: 6.5 % (ref 0–12)
NEUTROPHILS # BLD AUTO: 4.41 10*3/MM3 (ref 2–8.6)
NEUTROPHILS NFR BLD AUTO: 63.3 % (ref 37–80)
PLATELET # BLD AUTO: 236 10*3/MM3 (ref 150–450)
PMV BLD AUTO: 10.5 FL (ref 8–12)
POTASSIUM BLD-SCNC: 4.1 MMOL/L (ref 3.5–5.1)
PROT SERPL-MCNC: 7.7 G/DL (ref 6.3–8.6)
RBC # BLD AUTO: 5.07 10*6/MM3 (ref 3.77–5.16)
SODIUM BLD-SCNC: 140 MMOL/L (ref 137–145)
WBC NRBC COR # BLD: 6.96 10*3/MM3 (ref 3.2–9.8)

## 2018-03-02 PROCEDURE — 80053 COMPREHEN METABOLIC PANEL: CPT

## 2018-03-02 PROCEDURE — 36415 COLL VENOUS BLD VENIPUNCTURE: CPT

## 2018-03-02 PROCEDURE — 85025 COMPLETE CBC W/AUTO DIFF WBC: CPT

## 2018-03-16 ENCOUNTER — TRANSCRIBE ORDERS (OUTPATIENT)
Dept: LAB | Facility: HOSPITAL | Age: 58
End: 2018-03-16

## 2018-03-16 ENCOUNTER — LAB (OUTPATIENT)
Dept: LAB | Facility: HOSPITAL | Age: 58
End: 2018-03-16

## 2018-03-16 DIAGNOSIS — B18.2 HEPATITIS C CARRIER (HCC): ICD-10-CM

## 2018-03-16 DIAGNOSIS — B18.2 CHRONIC HEPATITIS C WITHOUT HEPATIC COMA (HCC): ICD-10-CM

## 2018-03-16 DIAGNOSIS — B18.2 HEPATITIS C CARRIER (HCC): Primary | ICD-10-CM

## 2018-03-16 LAB
ALBUMIN SERPL-MCNC: 4.4 G/DL (ref 3.4–4.8)
ALBUMIN/GLOB SERPL: 1.4 G/DL (ref 1.1–1.8)
ALP SERPL-CCNC: 125 U/L (ref 38–126)
ALT SERPL W P-5'-P-CCNC: 39 U/L (ref 9–52)
ANION GAP SERPL CALCULATED.3IONS-SCNC: 14 MMOL/L (ref 5–15)
AST SERPL-CCNC: 25 U/L (ref 14–36)
BASOPHILS # BLD AUTO: 0.04 10*3/MM3 (ref 0–0.2)
BASOPHILS NFR BLD AUTO: 0.6 % (ref 0–2)
BILIRUB SERPL-MCNC: 0.4 MG/DL (ref 0.2–1.3)
BUN BLD-MCNC: 11 MG/DL (ref 7–21)
BUN/CREAT SERPL: 19.6 (ref 7–25)
CALCIUM SPEC-SCNC: 9.8 MG/DL (ref 8.4–10.2)
CHLORIDE SERPL-SCNC: 100 MMOL/L (ref 95–110)
CO2 SERPL-SCNC: 26 MMOL/L (ref 22–31)
CREAT BLD-MCNC: 0.56 MG/DL (ref 0.5–1)
DEPRECATED RDW RBC AUTO: 38.6 FL (ref 36.4–46.3)
EOSINOPHIL # BLD AUTO: 0.23 10*3/MM3 (ref 0–0.7)
EOSINOPHIL NFR BLD AUTO: 3.2 % (ref 0–7)
ERYTHROCYTE [DISTWIDTH] IN BLOOD BY AUTOMATED COUNT: 13.7 % (ref 11.5–14.5)
GFR SERPL CREATININE-BSD FRML MDRD: 111 ML/MIN/1.73 (ref 60–120)
GLOBULIN UR ELPH-MCNC: 3.2 GM/DL (ref 2.3–3.5)
GLUCOSE BLD-MCNC: 162 MG/DL (ref 60–100)
HCT VFR BLD AUTO: 39.2 % (ref 35–45)
HGB BLD-MCNC: 13.3 G/DL (ref 12–15.5)
IMM GRANULOCYTES # BLD: 0.04 10*3/MM3 (ref 0–0.02)
IMM GRANULOCYTES NFR BLD: 0.6 % (ref 0–0.5)
LYMPHOCYTES # BLD AUTO: 2.16 10*3/MM3 (ref 0.6–4.2)
LYMPHOCYTES NFR BLD AUTO: 29.8 % (ref 10–50)
MCH RBC QN AUTO: 26.8 PG (ref 26.5–34)
MCHC RBC AUTO-ENTMCNC: 33.9 G/DL (ref 31.4–36)
MCV RBC AUTO: 78.9 FL (ref 80–98)
MONOCYTES # BLD AUTO: 0.52 10*3/MM3 (ref 0–0.9)
MONOCYTES NFR BLD AUTO: 7.2 % (ref 0–12)
NEUTROPHILS # BLD AUTO: 4.25 10*3/MM3 (ref 2–8.6)
NEUTROPHILS NFR BLD AUTO: 58.6 % (ref 37–80)
PLATELET # BLD AUTO: 243 10*3/MM3 (ref 150–450)
PMV BLD AUTO: 10.5 FL (ref 8–12)
POTASSIUM BLD-SCNC: 4.3 MMOL/L (ref 3.5–5.1)
PROT SERPL-MCNC: 7.6 G/DL (ref 6.3–8.6)
RBC # BLD AUTO: 4.97 10*6/MM3 (ref 3.77–5.16)
SODIUM BLD-SCNC: 140 MMOL/L (ref 137–145)
TSH SERPL DL<=0.05 MIU/L-ACNC: 2.47 MIU/ML (ref 0.46–4.68)
WBC NRBC COR # BLD: 7.24 10*3/MM3 (ref 3.2–9.8)

## 2018-03-16 PROCEDURE — 80053 COMPREHEN METABOLIC PANEL: CPT

## 2018-03-16 PROCEDURE — 36415 COLL VENOUS BLD VENIPUNCTURE: CPT

## 2018-03-16 PROCEDURE — 84443 ASSAY THYROID STIM HORMONE: CPT

## 2018-03-16 PROCEDURE — 85025 COMPLETE CBC W/AUTO DIFF WBC: CPT

## 2018-03-16 PROCEDURE — 87522 HEPATITIS C REVRS TRNSCRPJ: CPT

## 2018-03-17 LAB
HCV RNA SERPL NAA+PROBE-ACNC: NORMAL IU/ML
TEST INFORMATION: NORMAL

## 2018-03-22 ENCOUNTER — OFFICE VISIT (OUTPATIENT)
Dept: GASTROENTEROLOGY | Facility: CLINIC | Age: 58
End: 2018-03-22

## 2018-03-22 VITALS
DIASTOLIC BLOOD PRESSURE: 72 MMHG | WEIGHT: 160 LBS | BODY MASS INDEX: 29.44 KG/M2 | HEIGHT: 62 IN | SYSTOLIC BLOOD PRESSURE: 108 MMHG | HEART RATE: 114 BPM

## 2018-03-22 DIAGNOSIS — K75.81 NASH (NONALCOHOLIC STEATOHEPATITIS): ICD-10-CM

## 2018-03-22 DIAGNOSIS — B18.2 CHRONIC HEPATITIS C WITHOUT HEPATIC COMA (HCC): Primary | ICD-10-CM

## 2018-03-22 DIAGNOSIS — K21.00 GASTROESOPHAGEAL REFLUX DISEASE WITH ESOPHAGITIS: ICD-10-CM

## 2018-03-22 PROCEDURE — 99213 OFFICE O/P EST LOW 20 MIN: CPT | Performed by: PHYSICIAN ASSISTANT

## 2018-03-22 NOTE — PATIENT INSTRUCTIONS
Hepatitis C  Hepatitis C is a liver infection. It is caused by a germ that can spread through blood and other bodily fluids. Your doctor will use blood and liver tests to:  · Check for this infection.  · Decide how to treat you.  · Check your health after treatment.  Follow these instructions at home:  · Rest.  · Do not take any medicine unless your doctor says it is okay. This includes over-the-counter medicine and birth control pills.  · Do not drink alcohol.  · Do not have sex until your doctor says it is okay.  · Do not share toothbrushes, nail clippers, razors, or needles.  · Take all medicines as told by your doctor.  Contact a doctor if:  · You have a fever.  · Your belly (abdomen) hurts.  · Your pee (urine) is dark.  · Your poop (bowel movement) is the color of xander.  · You have joint pain.  Get help right away if:  · You feel more and more tired (fatigued).  · You feel more and more weak.  · You do not feel like eating.  · You feel sick to your stomach (nauseous) or throw up (vomit).  · Your skin or the whites of your eyes turn yellow (jaundice) or turn more yellow than they were before.  · You bruise or bleed easily.  This information is not intended to replace advice given to you by your health care provider. Make sure you discuss any questions you have with your health care provider.  Document Released: 11/30/2009 Document Revised: 05/25/2017 Document Reviewed: 04/01/2015  [employer] Interactive Patient Education © 2017 [employer]  MyPlate from [employer]  The general, healthful diet is based on the 2010 Dietary Guidelines for Americans. The amount of food you need to eat from each food group depends on your age, sex, and level of physical activity and can be individualized by a dietitian. Go to ChooseMyPlate.gov for more information.  What do I need to know about the MyPlate plan?  · Enjoy your food, but eat less.  · Avoid oversized portions.  ¨ ½ of your plate should include fruits and vegetables.  ¨ ¼ of  your plate should be grains.  ¨ ¼ of your plate should be protein.  Grains   · Make at least half of your grains whole grains.  · For a 2,000 calorie daily food plan, eat 6 oz every day.  · 1 oz is about 1 slice bread, 1 cup cereal, or ½ cup cooked rice, cereal, or pasta.  Vegetables   · Make half your plate fruits and vegetables.  · For a 2,000 calorie daily food plan, eat 2½ cups every day.  · 1 cup is about 1 cup raw or cooked vegetables or vegetable juice or 2 cups raw leafy greens.  Fruits   · Make half your plate fruits and vegetables.  · For a 2,000 calorie daily food plan, eat 2 cups every day.  · 1 cup is about 1 cup fruit or 100% fruit juice or ½ cup dried fruit.  Protein   · For a 2,000 calorie daily food plan, eat 5½ oz every day.  · 1 oz is about 1 oz meat, poultry, or fish, ¼ cup cooked beans, 1 egg, 1 Tbsp peanut butter, or ½ oz nuts or seeds.  Dairy   · Switch to fat-free or low-fat (1%) milk.  · For a 2,000 calorie daily food plan, eat 3 cups every day.  · 1 cup is about 1 cup milk or yogurt or soy milk (soy beverage), 1½ oz natural cheese, or 2 oz processed cheese.  Fats, Oils, and Empty Calories   · Only small amounts of oils are recommended.  · Empty calories are calories from solid fats or added sugars.  · Compare sodium in foods like soup, bread, and frozen meals. Choose the foods with lower numbers.  · Drink water instead of sugary drinks.  What foods can I eat?  Grains   Whole grains such as whole wheat, quinoa, millet, and bulgur. Bread, rolls, and pasta made from whole grains. Brown or wild rice. Hot or cold cereals made from whole grains and without added sugar.  Vegetables   All fresh vegetables, especially fresh red, dark green, or orange vegetables. Peas and beans. Low-sodium frozen or canned vegetables prepared without added salt. Low-sodium vegetable juices.  Fruits   All fresh, frozen, and dried fruits. Canned fruit packed in water or fruit juice without added sugar. Fruit juices  without added sugar.  Meats and Other Protein Sources   Boiled, baked, or grilled lean meat trimmed of fat. Skinless poultry. Fresh seafood and shellfish. Canned seafood packed in water. Unsalted nuts and unsalted nut butters. Tofu. Dried beans and pea. Eggs.  Dairy   Low-fat or fat-free milk, yogurt, and cheeses.  Sweets and Desserts   Frozen desserts made from low-fat milk.  Fats and Oils   Olive, peanut, and canola oils and margarine. Salad dressing and mayonnaise made from these oils.  Other   Soups and casseroles made from allowed ingredients and without added fat or salt.  The items listed above may not be a complete list of recommended foods or beverages. Contact your dietitian for more options.   What foods are not recommended?  Grains   Sweetened, low-fiber cereals. Packaged baked goods. Snack crackers and chips. Cheese crackers, butter crackers, and biscuits. Frozen waffles, sweet breads, doughnuts, pastries, packaged baking mixes, pancakes, cakes, and cookies.  Vegetables   Regular canned or frozen vegetables or vegetables prepared with salt. Canned tomatoes. Canned tomato sauce. Fried vegetables. Vegetables in cream sauce or cheese sauce.  Fruits   Fruits packed in syrup or made with added sugar.  Meats and Other Protein Sources   Marbled or fatty meats such as ribs. Poultry with skin. Fried meats, poultry, eggs, or fish. Sausages, hot dogs, and deli meats such as pastrami, bologna, or salami.  Dairy   Whole milk, cream, cheeses made from whole milk, sour cream. Ice cream or yogurt made from whole milk or with added sugar.  Beverages   For adults, no more than one alcoholic drink per day. Regular soft drinks or other sugary beverages. Juice drinks.  Sweets and Desserts   Sugary or fatty desserts, candy, and other sweets.  Fats and Oils   Solid shortening or partially hydrogenated oils. Solid margarine. Margarine that contains trans fats. Butter.  The items listed above may not be a complete list of foods  and beverages to avoid. Contact your dietitian for more information.   This information is not intended to replace advice given to you by your health care provider. Make sure you discuss any questions you have with your health care provider.  Document Released: 01/06/2009 Document Revised: 05/25/2017 Document Reviewed: 11/26/2014  [facility] Interactive Patient Education © 2017 [facility]  BMI for Adults  Body mass index (BMI) is a number that is calculated from a person's weight and height. In most adults, the number is used to find how much of an adult's weight is made up of fat. BMI is not as accurate as a direct measure of body fat.  How is BMI calculated?  BMI is calculated by dividing weight in kilograms by height in meters squared. It can also be calculated by dividing weight in pounds by height in inches squared, then multiplying the resulting number by 703. Charts are available to help you find your BMI quickly and easily without doing this calculation.  How is BMI interpreted?  Health care professionals use BMI charts to identify whether an adult is underweight, at a normal weight, or overweight based on the following guidelines:  · Underweight: BMI less than 18.5.  · Normal weight: BMI between 18.5 and 24.9.  · Overweight: BMI between 25 and 29.9.  · Obese: BMI of 30 and above.  BMI is usually interpreted the same for males and females.  Weight includes both fat and muscle, so someone with a muscular build, such as an athlete, may have a BMI that is higher than 24.9. In cases like these, BMI may not accurately depict body fat. To determine if excess body fat is the cause of a BMI of 25 or higher, further assessments may need to be done by a health care provider.  Why is BMI a useful tool?  BMI is used to identify a possible weight problem that may be related to a medical problem or may increase the risk for medical problems. BMI can also be used to promote changes to reach a healthy weight.  This  information is not intended to replace advice given to you by your health care provider. Make sure you discuss any questions you have with your health care provider.  Document Released: 08/29/2005 Document Revised: 04/27/2017 Document Reviewed: 05/15/2015  [employer] Interactive Patient Education © 2017 [employer]

## 2018-03-30 ENCOUNTER — LAB (OUTPATIENT)
Dept: LAB | Facility: HOSPITAL | Age: 58
End: 2018-03-30

## 2018-03-30 DIAGNOSIS — B18.2 CHRONIC HEPATITIS C WITHOUT HEPATIC COMA (HCC): ICD-10-CM

## 2018-03-30 LAB
ALBUMIN SERPL-MCNC: 4.3 G/DL (ref 3.4–4.8)
ALBUMIN/GLOB SERPL: 1.4 G/DL (ref 1.1–1.8)
ALP SERPL-CCNC: 125 U/L (ref 38–126)
ALT SERPL W P-5'-P-CCNC: 28 U/L (ref 9–52)
ANION GAP SERPL CALCULATED.3IONS-SCNC: 16 MMOL/L (ref 5–15)
AST SERPL-CCNC: 18 U/L (ref 14–36)
BASOPHILS # BLD AUTO: 0.04 10*3/MM3 (ref 0–0.2)
BASOPHILS NFR BLD AUTO: 0.5 % (ref 0–2)
BILIRUB SERPL-MCNC: 0.5 MG/DL (ref 0.2–1.3)
BUN BLD-MCNC: 14 MG/DL (ref 7–21)
BUN/CREAT SERPL: 24.1 (ref 7–25)
CALCIUM SPEC-SCNC: 9.5 MG/DL (ref 8.4–10.2)
CHLORIDE SERPL-SCNC: 98 MMOL/L (ref 95–110)
CO2 SERPL-SCNC: 27 MMOL/L (ref 22–31)
CREAT BLD-MCNC: 0.58 MG/DL (ref 0.5–1)
DEPRECATED RDW RBC AUTO: 39.6 FL (ref 36.4–46.3)
EOSINOPHIL # BLD AUTO: 0.21 10*3/MM3 (ref 0–0.7)
EOSINOPHIL NFR BLD AUTO: 2.7 % (ref 0–7)
ERYTHROCYTE [DISTWIDTH] IN BLOOD BY AUTOMATED COUNT: 13.8 % (ref 11.5–14.5)
GFR SERPL CREATININE-BSD FRML MDRD: 106 ML/MIN/1.73 (ref 51–120)
GLOBULIN UR ELPH-MCNC: 3.1 GM/DL (ref 2.3–3.5)
GLUCOSE BLD-MCNC: 118 MG/DL (ref 60–100)
HCT VFR BLD AUTO: 38.6 % (ref 35–45)
HGB BLD-MCNC: 13 G/DL (ref 12–15.5)
IMM GRANULOCYTES # BLD: 0.07 10*3/MM3 (ref 0–0.02)
IMM GRANULOCYTES NFR BLD: 0.9 % (ref 0–0.5)
LYMPHOCYTES # BLD AUTO: 2.26 10*3/MM3 (ref 0.6–4.2)
LYMPHOCYTES NFR BLD AUTO: 29.4 % (ref 10–50)
MCH RBC QN AUTO: 26.7 PG (ref 26.5–34)
MCHC RBC AUTO-ENTMCNC: 33.7 G/DL (ref 31.4–36)
MCV RBC AUTO: 79.3 FL (ref 80–98)
MONOCYTES # BLD AUTO: 0.5 10*3/MM3 (ref 0–0.9)
MONOCYTES NFR BLD AUTO: 6.5 % (ref 0–12)
NEUTROPHILS # BLD AUTO: 4.61 10*3/MM3 (ref 2–8.6)
NEUTROPHILS NFR BLD AUTO: 60 % (ref 37–80)
NRBC BLD MANUAL-RTO: 0 /100 WBC (ref 0–0)
PLATELET # BLD AUTO: 246 10*3/MM3 (ref 150–450)
PMV BLD AUTO: 10 FL (ref 8–12)
POTASSIUM BLD-SCNC: 3.9 MMOL/L (ref 3.5–5.1)
PROT SERPL-MCNC: 7.4 G/DL (ref 6.3–8.6)
RBC # BLD AUTO: 4.87 10*6/MM3 (ref 3.77–5.16)
SODIUM BLD-SCNC: 141 MMOL/L (ref 137–145)
WBC NRBC COR # BLD: 7.69 10*3/MM3 (ref 3.2–9.8)

## 2018-03-30 PROCEDURE — 36415 COLL VENOUS BLD VENIPUNCTURE: CPT

## 2018-03-30 PROCEDURE — 80053 COMPREHEN METABOLIC PANEL: CPT

## 2018-03-30 PROCEDURE — 85025 COMPLETE CBC W/AUTO DIFF WBC: CPT

## 2018-04-20 ENCOUNTER — LAB (OUTPATIENT)
Dept: LAB | Facility: HOSPITAL | Age: 58
End: 2018-04-20

## 2018-04-20 ENCOUNTER — TRANSCRIBE ORDERS (OUTPATIENT)
Dept: LAB | Facility: HOSPITAL | Age: 58
End: 2018-04-20

## 2018-04-20 DIAGNOSIS — I10 ESSENTIAL (PRIMARY) HYPERTENSION: ICD-10-CM

## 2018-04-20 DIAGNOSIS — B18.2 CHRONIC HEPATITIS C WITHOUT HEPATIC COMA (HCC): ICD-10-CM

## 2018-04-20 DIAGNOSIS — F11.90 OPIATE USE: Primary | ICD-10-CM

## 2018-04-20 LAB
ALBUMIN SERPL-MCNC: 4.3 G/DL (ref 3.4–4.8)
ALBUMIN UR-MCNC: <0.6 MG/L
ALBUMIN/GLOB SERPL: 1.3 G/DL (ref 1.1–1.8)
ALP SERPL-CCNC: 106 U/L (ref 38–126)
ALT SERPL W P-5'-P-CCNC: 33 U/L (ref 9–52)
ANION GAP SERPL CALCULATED.3IONS-SCNC: 16 MMOL/L (ref 5–15)
AST SERPL-CCNC: 28 U/L (ref 14–36)
BILIRUB SERPL-MCNC: 0.1 MG/DL (ref 0.2–1.3)
BUN BLD-MCNC: 11 MG/DL (ref 7–21)
BUN/CREAT SERPL: 21.2 (ref 7–25)
CALCIUM SPEC-SCNC: 9.4 MG/DL (ref 8.4–10.2)
CHLORIDE SERPL-SCNC: 101 MMOL/L (ref 95–110)
CO2 SERPL-SCNC: 23 MMOL/L (ref 22–31)
CREAT BLD-MCNC: 0.52 MG/DL (ref 0.5–1)
DEPRECATED RDW RBC AUTO: 39.4 FL (ref 36.4–46.3)
ERYTHROCYTE [DISTWIDTH] IN BLOOD BY AUTOMATED COUNT: 14 % (ref 11.5–14.5)
GFR SERPL CREATININE-BSD FRML MDRD: 121 ML/MIN/1.73 (ref 51–120)
GLOBULIN UR ELPH-MCNC: 3.2 GM/DL (ref 2.3–3.5)
GLUCOSE BLD-MCNC: 160 MG/DL (ref 60–100)
HBA1C MFR BLD: 7.9 % (ref 4–5.6)
HCT VFR BLD AUTO: 39.2 % (ref 35–45)
HGB BLD-MCNC: 12.9 G/DL (ref 12–15.5)
MCH RBC QN AUTO: 25.6 PG (ref 26.5–34)
MCHC RBC AUTO-ENTMCNC: 32.9 G/DL (ref 31.4–36)
MCV RBC AUTO: 77.9 FL (ref 80–98)
PLATELET # BLD AUTO: 227 10*3/MM3 (ref 150–450)
PMV BLD AUTO: 11.1 FL (ref 8–12)
POTASSIUM BLD-SCNC: 4.2 MMOL/L (ref 3.5–5.1)
PROT SERPL-MCNC: 7.5 G/DL (ref 6.3–8.6)
RBC # BLD AUTO: 5.03 10*6/MM3 (ref 3.77–5.16)
SODIUM BLD-SCNC: 140 MMOL/L (ref 137–145)
TSH SERPL DL<=0.05 MIU/L-ACNC: 2.52 MIU/ML (ref 0.46–4.68)
WBC NRBC COR # BLD: 6.72 10*3/MM3 (ref 3.2–9.8)

## 2018-04-20 PROCEDURE — 84443 ASSAY THYROID STIM HORMONE: CPT | Performed by: NURSE PRACTITIONER

## 2018-04-20 PROCEDURE — 80061 LIPID PANEL: CPT | Performed by: NURSE PRACTITIONER

## 2018-04-20 PROCEDURE — 85027 COMPLETE CBC AUTOMATED: CPT

## 2018-04-20 PROCEDURE — 83010 ASSAY OF HAPTOGLOBIN QUANT: CPT

## 2018-04-20 PROCEDURE — 83036 HEMOGLOBIN GLYCOSYLATED A1C: CPT | Performed by: NURSE PRACTITIONER

## 2018-04-20 PROCEDURE — 83883 ASSAY NEPHELOMETRY NOT SPEC: CPT

## 2018-04-20 PROCEDURE — 36415 COLL VENOUS BLD VENIPUNCTURE: CPT

## 2018-04-20 PROCEDURE — 82247 BILIRUBIN TOTAL: CPT

## 2018-04-20 PROCEDURE — 84460 ALANINE AMINO (ALT) (SGPT): CPT

## 2018-04-20 PROCEDURE — 82977 ASSAY OF GGT: CPT

## 2018-04-20 PROCEDURE — 82172 ASSAY OF APOLIPOPROTEIN: CPT

## 2018-04-20 PROCEDURE — 83700 LIPOPRO BLD ELECTROPHORETIC: CPT | Performed by: NURSE PRACTITIONER

## 2018-04-20 PROCEDURE — 80053 COMPREHEN METABOLIC PANEL: CPT | Performed by: NURSE PRACTITIONER

## 2018-04-20 PROCEDURE — 87522 HEPATITIS C REVRS TRNSCRPJ: CPT

## 2018-04-20 PROCEDURE — 82043 UR ALBUMIN QUANTITATIVE: CPT | Performed by: NURSE PRACTITIONER

## 2018-04-21 LAB
HCV RNA SERPL NAA+PROBE-ACNC: NORMAL IU/ML
TEST INFORMATION: NORMAL

## 2018-04-24 LAB
A2 MACROGLOB SERPL-MCNC: 207 MG/DL (ref 110–276)
ALT SERPL W P-5'-P-CCNC: 25 IU/L (ref 0–40)
APO A-I SERPL-MCNC: 114 MG/DL (ref 116–209)
BILIRUB SERPL-MCNC: 0.1 MG/DL (ref 0–1.2)
FIBROSIS SCORING:: ABNORMAL
FIBROSIS STAGE SERPL QL: ABNORMAL
GGT SERPL-CCNC: 19 IU/L (ref 0–60)
HAPTOGLOB SERPL-MCNC: 231 MG/DL (ref 34–200)
HCV AB SER QL: ABNORMAL
LABORATORY COMMENT REPORT: ABNORMAL
LIMITATIONS:: ABNORMAL
LIVER FIBR SCORE SERPL CALC.FIBROSURE: 0.07 (ref 0–0.21)
NECROINFLAMM ACTIVITY SCORING:: ABNORMAL
NECROINFLAMMATORY ACT GRADE SERPL QL: ABNORMAL
NECROINFLAMMATORY ACT SCORE SERPL: 0.08 (ref 0–0.17)

## 2018-04-27 LAB
CHOLEST SERPL-MCNC: 186 MG/DL (ref 100–199)
HDLC SERPL-MCNC: 36 MG/DL
LDLC SERPL CALC-MCNC: ABNORMAL MG/DL (ref 0–99)
LP SERPL ELPH-IMP: ABNORMAL
TRIGL SERPL-MCNC: 448 MG/DL (ref 0–149)
VLDLC SERPL-MCNC: ABNORMAL MG/DL (ref 5–40)

## 2018-05-02 ENCOUNTER — OFFICE VISIT (OUTPATIENT)
Dept: GASTROENTEROLOGY | Facility: CLINIC | Age: 58
End: 2018-05-02

## 2018-05-02 VITALS
DIASTOLIC BLOOD PRESSURE: 82 MMHG | HEIGHT: 63 IN | HEART RATE: 114 BPM | SYSTOLIC BLOOD PRESSURE: 114 MMHG | WEIGHT: 159.2 LBS | BODY MASS INDEX: 28.21 KG/M2

## 2018-05-02 DIAGNOSIS — K21.00 GASTROESOPHAGEAL REFLUX DISEASE WITH ESOPHAGITIS: ICD-10-CM

## 2018-05-02 DIAGNOSIS — B18.2 CHRONIC HEPATITIS C WITHOUT HEPATIC COMA (HCC): Primary | ICD-10-CM

## 2018-05-02 DIAGNOSIS — K75.81 NASH (NONALCOHOLIC STEATOHEPATITIS): ICD-10-CM

## 2018-05-02 PROCEDURE — 99214 OFFICE O/P EST MOD 30 MIN: CPT | Performed by: PHYSICIAN ASSISTANT

## 2018-05-02 RX ORDER — AMITRIPTYLINE HYDROCHLORIDE 100 MG/1
200 TABLET, FILM COATED ORAL NIGHTLY
COMMUNITY

## 2018-05-02 NOTE — PATIENT INSTRUCTIONS
MyPlate from [employer]  The general, healthful diet is based on the 2010 Dietary Guidelines for Americans. The amount of food you need to eat from each food group depends on your age, sex, and level of physical activity and can be individualized by a dietitian. Go to ChooseMyPlate.gov for more information.  What do I need to know about the MyPlate plan?  · Enjoy your food, but eat less.  · Avoid oversized portions.  ¨ ½ of your plate should include fruits and vegetables.  ¨ ¼ of your plate should be grains.  ¨ ¼ of your plate should be protein.  Grains   · Make at least half of your grains whole grains.  · For a 2,000 calorie daily food plan, eat 6 oz every day.  · 1 oz is about 1 slice bread, 1 cup cereal, or ½ cup cooked rice, cereal, or pasta.  Vegetables   · Make half your plate fruits and vegetables.  · For a 2,000 calorie daily food plan, eat 2½ cups every day.  · 1 cup is about 1 cup raw or cooked vegetables or vegetable juice or 2 cups raw leafy greens.  Fruits   · Make half your plate fruits and vegetables.  · For a 2,000 calorie daily food plan, eat 2 cups every day.  · 1 cup is about 1 cup fruit or 100% fruit juice or ½ cup dried fruit.  Protein   · For a 2,000 calorie daily food plan, eat 5½ oz every day.  · 1 oz is about 1 oz meat, poultry, or fish, ¼ cup cooked beans, 1 egg, 1 Tbsp peanut butter, or ½ oz nuts or seeds.  Dairy   · Switch to fat-free or low-fat (1%) milk.  · For a 2,000 calorie daily food plan, eat 3 cups every day.  · 1 cup is about 1 cup milk or yogurt or soy milk (soy beverage), 1½ oz natural cheese, or 2 oz processed cheese.  Fats, Oils, and Empty Calories   · Only small amounts of oils are recommended.  · Empty calories are calories from solid fats or added sugars.  · Compare sodium in foods like soup, bread, and frozen meals. Choose the foods with lower numbers.  · Drink water instead of sugary drinks.  What foods can I eat?  Grains   Whole grains such as whole wheat, quinoa, millet,  and bulgur. Bread, rolls, and pasta made from whole grains. Brown or wild rice. Hot or cold cereals made from whole grains and without added sugar.  Vegetables   All fresh vegetables, especially fresh red, dark green, or orange vegetables. Peas and beans. Low-sodium frozen or canned vegetables prepared without added salt. Low-sodium vegetable juices.  Fruits   All fresh, frozen, and dried fruits. Canned fruit packed in water or fruit juice without added sugar. Fruit juices without added sugar.  Meats and Other Protein Sources   Boiled, baked, or grilled lean meat trimmed of fat. Skinless poultry. Fresh seafood and shellfish. Canned seafood packed in water. Unsalted nuts and unsalted nut butters. Tofu. Dried beans and pea. Eggs.  Dairy   Low-fat or fat-free milk, yogurt, and cheeses.  Sweets and Desserts   Frozen desserts made from low-fat milk.  Fats and Oils   Olive, peanut, and canola oils and margarine. Salad dressing and mayonnaise made from these oils.  Other   Soups and casseroles made from allowed ingredients and without added fat or salt.  The items listed above may not be a complete list of recommended foods or beverages. Contact your dietitian for more options.   What foods are not recommended?  Grains   Sweetened, low-fiber cereals. Packaged baked goods. Snack crackers and chips. Cheese crackers, butter crackers, and biscuits. Frozen waffles, sweet breads, doughnuts, pastries, packaged baking mixes, pancakes, cakes, and cookies.  Vegetables   Regular canned or frozen vegetables or vegetables prepared with salt. Canned tomatoes. Canned tomato sauce. Fried vegetables. Vegetables in cream sauce or cheese sauce.  Fruits   Fruits packed in syrup or made with added sugar.  Meats and Other Protein Sources   Marbled or fatty meats such as ribs. Poultry with skin. Fried meats, poultry, eggs, or fish. Sausages, hot dogs, and deli meats such as pastrami, bologna, or salami.  Dairy   Whole milk, cream, cheeses made  from whole milk, sour cream. Ice cream or yogurt made from whole milk or with added sugar.  Beverages   For adults, no more than one alcoholic drink per day. Regular soft drinks or other sugary beverages. Juice drinks.  Sweets and Desserts   Sugary or fatty desserts, candy, and other sweets.  Fats and Oils   Solid shortening or partially hydrogenated oils. Solid margarine. Margarine that contains trans fats. Butter.  The items listed above may not be a complete list of foods and beverages to avoid. Contact your dietitian for more information.   This information is not intended to replace advice given to you by your health care provider. Make sure you discuss any questions you have with your health care provider.  Document Released: 01/06/2009 Document Revised: 05/25/2017 Document Reviewed: 11/26/2014  [employer] Interactive Patient Education © 2017 [employer]  BMI for Adults  Body mass index (BMI) is a number that is calculated from a person's weight and height. In most adults, the number is used to find how much of an adult's weight is made up of fat. BMI is not as accurate as a direct measure of body fat.  How is BMI calculated?  BMI is calculated by dividing weight in kilograms by height in meters squared. It can also be calculated by dividing weight in pounds by height in inches squared, then multiplying the resulting number by 703. Charts are available to help you find your BMI quickly and easily without doing this calculation.  How is BMI interpreted?  Health care professionals use BMI charts to identify whether an adult is underweight, at a normal weight, or overweight based on the following guidelines:  · Underweight: BMI less than 18.5.  · Normal weight: BMI between 18.5 and 24.9.  · Overweight: BMI between 25 and 29.9.  · Obese: BMI of 30 and above.  BMI is usually interpreted the same for males and females.  Weight includes both fat and muscle, so someone with a muscular build, such as an athlete, may  have a BMI that is higher than 24.9. In cases like these, BMI may not accurately depict body fat. To determine if excess body fat is the cause of a BMI of 25 or higher, further assessments may need to be done by a health care provider.  Why is BMI a useful tool?  BMI is used to identify a possible weight problem that may be related to a medical problem or may increase the risk for medical problems. BMI can also be used to promote changes to reach a healthy weight.  This information is not intended to replace advice given to you by your health care provider. Make sure you discuss any questions you have with your health care provider.  Document Released: 08/29/2005 Document Revised: 04/27/2017 Document Reviewed: 05/15/2015  [employer] Interactive Patient Education © 2017 [employer]  Hepatitis C  Hepatitis C is a viral infection of the liver. It can lead to scarring of the liver (cirrhosis), liver failure, or liver cancer. Hepatitis C may go undetected for months or years because people with the infection may not have symptoms, or they may have only mild symptoms.  What are the causes?  Hepatitis C is caused by the hepatitis C virus (HCV). The virus can be passed from one person to another through:  · Blood.  · Contaminated needles, such as those used for tattooing, body piercing, acupuncture, or injecting drugs.  · Having unprotected sex with an infected person.  · Childbirth.  · Blood transfusions or organ transplants done in the [place] before 1992.  What increases the risk?  Risk factors for hepatitis C include:  · Having unprotected sex with an infected person.  · Using illegal drugs.  What are the signs or symptoms?  Symptoms of hepatitis C may include:  · Fatigue.  · Loss of appetite.  · Nausea.  · Vomiting.  · Abdominal pain.  · Dark yellow urine.  · Yellowish skin and eyes (jaundice).  · Itching of the skin.  · Jarrod-colored bowel movements.  · Joint pain.  Symptoms are not always present.  How is this  diagnosed?  Hepatitis C is diagnosed with blood tests. Other types of tests may also be done to check how your liver is functioning.  How is this treated?  Your health care provider may perform noninvasive tests or a liver biopsy to help determine the best course of treatment. Treatment for hepatitis C may include one or more medicines. Your health care provider may check you for a recurring infection or other liver conditions every 6-12 months after treatment.  Follow these instructions at home:  · Rest as needed.  · Take all medicines as directed by your health care provider.  · Do not take any medicine unless approved by your health care provider. This includes over-the-counter medicine and birth control pills.  · Do not drink alcohol.  · Do not have sex until approved by your health care provider.  · Do not share toothbrushes, nail clippers, razors, or needles.  How is this prevented?  There is no vaccine for hepatitis C. The only way to prevent the disease is to reduce the risk of exposure to the virus. This may be done by:  · Practicing safe sex and using condoms.  · Avoiding illegal drugs.  Contact a health care provider if:  · You have a fever.  · You develop abdominal pain.  · You develop dark urine.  · You have xander-colored bowel movements.  · You develop joint pains.  Get help right away if:  · You have increasing fatigue or weakness.  · You lose your appetite.  · You feel nauseous or vomit.  · You develop jaundice or your jaundice gets worse.  · You bruise or bleed easily.  This information is not intended to replace advice given to you by your health care provider. Make sure you discuss any questions you have with your health care provider.  Document Released: 12/15/2001 Document Revised: 05/25/2017 Document Reviewed: 04/01/2015  [employer] Interactive Patient Education © 2017 [employer]

## 2018-05-02 NOTE — PROGRESS NOTES
Chief Complaint   Patient presents with   • Hepatitis C     Finished 8 weeks Mavyret on 4/12/18   • Torres       ENDO PROCEDURE ORDERED:    Subjective    Marguerite Rob is a 58 y.o. female. she is here today for follow-up.    History of Present Illness    Patient seen on a recheck of her GERD, chronic active hepatitis C, TORRES. Last seen 03/22/2018, Genotype 2B/F0. Patient completed 8 weeks of Mavyret on 04/16/2018. She has had no significant difficulties. She would like to resume her medications for her severe reflux. She denied nausea, vomiting, dysphagia, bowel movements are regular without blood or mucus. Weight is down less than 1 pound since last visit. Last colonoscopy 02/08/2016.      Laboratory on 03/30/2018 showed normal CBC, CMP showed a glucose 108, otherwise normal.     Studies on 04/20/2018 showed normal CBC, TSH, CMP showed a glucose 160, otherwise normal. A1c 7.9%. HCV RNA by PCR quantitative was not detected. HCV FibroSure was performed but I did not order this study, it did not show a significant change.     A/P: Chronic active hepatitis C, patient has completed treatment. Will plan follow-up at 12 weeks to see if she has cleared, she may resume her Protonix, Lopid, Pepcid. She will be due for hepatoma screening in June and is scheduled for right upper quadrant ultrasound, CBC, CMP, AFP, RNA at 12 weeks from her final medication. Further pending clinical course and the results of the above.         The following portions of the patient's history were reviewed and updated as appropriate:   Past Medical History:   Diagnosis Date   • Alcoholic fatty liver    • Alkaline phosphatase raised    • Anxiety    • Asthma     IgE-MEDIATED ALLERGIC ASTHMA   • Backache     CHRONIC   • CHF (congestive heart failure)    • Chronic hepatitis C     2b. Fibrosure .05/F0, necroinflam .14/A0. Repeat .05/F0, .13/A0      • Chronic neck pain    • Constipation    • COPD (chronic obstructive pulmonary disease)    • Diabetes     • Diarrhea    • Elevated levels of transaminase & lactic acid dehydrogenase    • Emphysema, unspecified    • Generalized abdominal pain    • GERD (gastroesophageal reflux disease)     WITH ESOPHAGITIS   • Hepatitis    • High risk sexual behavior    • Hypertension    • Hypokalemia    • Irritable bowel syndrome (IBS)    • Multiple joint pain    • Need for vaccination    • On long term drug therapy    • Shoulder pain    • Tobacco dependence syndrome      Past Surgical History:   Procedure Laterality Date   • APPENDECTOMY     • APPENDECTOMY     • BACK SURGERY     • CHOLECYSTECTOMY     • CHOLECYSTECTOMY     • COLONOSCOPY  02/08/2016   • COLONOSCOPY W/ POLYPECTOMY  02/08/2016    External and internal hemorrhoids.The examination was otherwise normal.Fluid aspiration performed.Several biopsies obtained in the entire colon.   • ENDOSCOPY  02/08/2016    Mildly severe esophagitis.Gastritis.Normal examined duodenum.Several biopsies obtained in the lower third of the esophagus.Several biopsies obtained in the gastric antrum.Several biopsies obtained in the first part of the duodenum.   • ENDOSCOPY AND COLONOSCOPY  08/13/2012    Internal & external hemorrhoids found. Scope could not pass into the TI.   • ENDOSCOPY W/ PEG TUBE PLACEMENT  08/13/2012    Esophagitis seen. Biopsy taken. Gastritis in stomach. Biopsy taken. Normal duodenum. Biopsy taken.   • HEMORRHOIDECTOMY     • HERNIA REPAIR     • INJECTION OF MEDICATION  08/01/2013    METHYLPREDNISONE, X2   • INJECTION OF MEDICATION  11/03/2011    KENALOG   • INJECTION OF MEDICATION  02/24/2011    ROCEPHIN   • TONSILLECTOMY AND ADENOIDECTOMY     • TOTAL ABDOMINAL HYSTERECTOMY WITH SALPINGO OOPHORECTOMY     • UPPER GASTROINTESTINAL ENDOSCOPY  02/08/2016     Family History   Problem Relation Age of Onset   • Coronary artery disease Mother    • Diabetes Mother    • Heart failure Mother    • Cancer Father    • Diabetes Father    • Heart failure Father    • Thyroid disease Father    •  Breast cancer Sister    • Cancer Other      COLORECTAL   • Endometrial cancer Other    • Ovarian cancer Other    • Diabetes Brother      OB History     No data available        Allergies   Allergen Reactions   • Doxycycline Itching   • Penicillins Rash     Social History     Social History   • Marital status:      Social History Main Topics   • Smoking status: Current Every Day Smoker     Packs/day: 0.25     Types: Cigarettes   • Smokeless tobacco: Never Used   • Alcohol use No   • Drug use: No   • Sexual activity: Defer     Other Topics Concern   • Not on file       Current Outpatient Prescriptions:   •  albuterol (PROVENTIL HFA;VENTOLIN HFA) 108 (90 BASE) MCG/ACT inhaler, Inhale 2 puffs Every 4 (Four) Hours As Needed for wheezing., Disp: , Rfl:   •  albuterol (PROVENTIL) (5 MG/ML) 0.5% nebulizer solution, Take 2.5 mg by nebulization Every 6 (Six) Hours As Needed for wheezing., Disp: , Rfl:   •  amitriptyline (ELAVIL) 100 MG tablet, Take 200 mg by mouth Every Night., Disp: , Rfl:   •  amLODIPine (NORVASC) 5 MG tablet, Take 5 mg by mouth Daily., Disp: , Rfl:   •  benzonatate (TESSALON) 100 MG capsule, Take 100 mg by mouth 3 (Three) Times a Day As Needed for Cough., Disp: , Rfl:   •  budesonide-formoterol (SYMBICORT) 160-4.5 MCG/ACT inhaler, Inhale 2 puffs 2 (Two) Times a Day., Disp: , Rfl:   •  cetirizine (zyrTEC) 10 MG tablet, Take 10 mg by mouth Daily., Disp: , Rfl:   •  diphenoxylate-atropine (LOMOTIL) 2.5-0.025 MG per tablet, Take 1 tablet by mouth 4 (Four) Times a Day As Needed for Diarrhea., Disp: , Rfl:   •  fluconazole (DIFLUCAN) 100 MG tablet, Take 100 mg by mouth 1 (One) Time Per Week., Disp: , Rfl:   •  fluticasone (FLONASE) 50 MCG/ACT nasal spray, 1 spray into each nostril Daily., Disp: , Rfl:   •  furosemide (LASIX) 20 MG tablet, Take 20 mg by mouth Daily., Disp: , Rfl:   •  HYDROcodone-acetaminophen (NORCO) 7.5-325 MG per tablet, Take 1 tablet by mouth 3 (Three) Times a Day., Disp: , Rfl:   •   "JANUMET XR  MG tablet sustained-release 24 hour, Take 1 tablet by mouth 2 (Two) Times a Day., Disp: , Rfl:   •  montelukast (SINGULAIR) 10 MG tablet, Take 1 tablet by mouth Every Night., Disp: 30 tablet, Rfl: 11  •  omeprazole (priLOSEC) 20 MG capsule, Take 1 capsule by mouth Daily., Disp: 30 capsule, Rfl: 1  •  potassium chloride (K-DUR,KLOR-CON) 10 MEQ CR tablet, Take 10 mEq by mouth 2 (Two) Times a Day., Disp: , Rfl:   •  pravastatin (PRAVACHOL) 20 MG tablet, Take 20 mg by mouth Every Night., Disp: , Rfl:   •  sennosides-docusate sodium (SENOKOT-S) 8.6-50 MG tablet, Take 2 tablets by mouth Every Night., Disp: , Rfl:   Review of Systems  Review of Systems       Objective    /82 (BP Location: Left arm)   Pulse 114   Ht 160 cm (63\")   Wt 72.2 kg (159 lb 3.2 oz)   BMI 28.20 kg/m²   Physical Exam   Constitutional: She is oriented to person, place, and time. She appears well-developed and well-nourished. No distress.   HENT:   Head: Normocephalic and atraumatic.   Eyes: EOM are normal. Pupils are equal, round, and reactive to light.   Neck: Normal range of motion.   Cardiovascular: Normal rate, regular rhythm and normal heart sounds.    Pulmonary/Chest: Effort normal and breath sounds normal.   Abdominal: Soft. Bowel sounds are normal. She exhibits no shifting dullness, no distension, no abdominal bruit, no ascites and no mass. There is no hepatosplenomegaly. There is tenderness. There is no rigidity, no rebound, no guarding and no CVA tenderness. No hernia. Hernia confirmed negative in the ventral area.   Mild diffuse   Musculoskeletal: Normal range of motion.   Neurological: She is alert and oriented to person, place, and time.   Skin: Skin is warm and dry.   Psychiatric: She has a normal mood and affect. Her behavior is normal. Judgment and thought content normal.   Nursing note and vitals reviewed.    Assessment/Plan      1. Chronic hepatitis C without hepatic coma    2. TORRES (nonalcoholic " steatohepatitis)    3. Gastroesophageal reflux disease with esophagitis    .   Marguerite was seen today for hepatitis c and richards.    Diagnoses and all orders for this visit:    Chronic hepatitis C without hepatic coma  -     CBC Auto Differential; Future  -     Comprehensive Metabolic Panel; Future  -     AFP Tumor Marker; Future  -     HCV RT-PCR,Quant(Non-Graph); Future  -     Protime-INR; Future  -     US Liver; Future    RICHARDS (nonalcoholic steatohepatitis)  -     CBC Auto Differential; Future  -     Comprehensive Metabolic Panel; Future  -     AFP Tumor Marker; Future  -     HCV RT-PCR,Quant(Non-Graph); Future  -     Protime-INR; Future  -     US Liver; Future    Gastroesophageal reflux disease with esophagitis  -     CBC Auto Differential; Future  -     Comprehensive Metabolic Panel; Future  -     AFP Tumor Marker; Future  -     HCV RT-PCR,Quant(Non-Graph); Future  -     Protime-INR; Future  -     US Liver; Future        Orders placed during this encounter include:  Orders Placed This Encounter   Procedures   • US Liver     Due before follow up in July     Standing Status:   Future     Standing Expiration Date:   7/13/2018     Scheduling Instructions:      RUQ     Order Specific Question:   Reason for Exam:     Answer:   HCV, elevated LFT     Order Specific Question:   Will this be performed with Elastography? ([facility] ONLY)     Answer:   No   • CBC Auto Differential     Due before follow up in July     Standing Status:   Future     Standing Expiration Date:   7/13/2018   • Comprehensive Metabolic Panel     Due before follow up in July     Standing Status:   Future     Standing Expiration Date:   7/13/2018   • AFP Tumor Marker     Due before follow up in July     Standing Status:   Future     Standing Expiration Date:   7/13/2018   • Protime-INR     Due before follow up in July     Standing Status:   Future     Standing Expiration Date:   7/13/2018       Medications prescribed:  No orders of the  defined types were placed in this encounter.    Discontinued Medications       Reason for Discontinue    amitriptyline (ELAVIL) 100 MG tablet Dose adjustment    Glecaprevir-Pibrentasvir 100-40 MG tablet *Therapy completed        Requested Prescriptions      No prescriptions requested or ordered in this encounter       Review and/or summary of lab tests, radiology, procedures, medications. Review and summary of old records and obtaining of history. The risks and benefits of my recommendations, as well as other treatment options were discussed with the patient today. Questions were answered.    Follow-up: Return in about 2 months (around 7/2/2018), or if symptoms worsen or fail to improve, for lab/US prior.     * Surgery not found *      This document has been electronically signed by Genaro Franco PA-C on May 6, 2018 8:14 PM      Results for orders placed or performed in visit on 04/20/18   Lipoprotein Phenotyping   Result Value Ref Range    Total Cholesterol 186 100 - 199 mg/dL    Triglycerides 448 (H) 0 - 149 mg/dL    HDL Cholesterol 36 (L) >39 mg/dL    VLDL Cholesterol Comment 5 - 40 mg/dL    LDL Cholesterol  Comment 0 - 99 mg/dL    Lipoprotein Phenotype Comment    MicroAlbumin, Urine, Random - Urine, Clean Catch   Result Value Ref Range    Microalbumin, Urine <0.6 mg/L   TSH   Result Value Ref Range    TSH 2.520 0.460 - 4.680 mIU/mL   Hemoglobin A1c   Result Value Ref Range    Hemoglobin A1C 7.9 (H) 4 - 5.6 %   Comprehensive Metabolic Panel   Result Value Ref Range    Glucose 160 (H) 60 - 100 mg/dL    BUN 11 7 - 21 mg/dL    Creatinine 0.52 0.50 - 1.00 mg/dL    Sodium 140 137 - 145 mmol/L    Potassium 4.2 3.5 - 5.1 mmol/L    Chloride 101 95 - 110 mmol/L    CO2 23.0 22.0 - 31.0 mmol/L    Calcium 9.4 8.4 - 10.2 mg/dL    Total Protein 7.5 6.3 - 8.6 g/dL    Albumin 4.30 3.40 - 4.80 g/dL    ALT (SGPT) 33 9 - 52 U/L    AST (SGOT) 28 14 - 36 U/L    Alkaline Phosphatase 106 38 - 126 U/L    Total Bilirubin 0.1 (L) 0.2  - 1.3 mg/dL    eGFR Non  Amer 121 (H) 51 - 120 mL/min/1.73    Globulin 3.2 2.3 - 3.5 gm/dL    A/G Ratio 1.3 1.1 - 1.8 g/dL    BUN/Creatinine Ratio 21.2 7.0 - 25.0    Anion Gap 16.0 (H) 5.0 - 15.0 mmol/L   Results for orders placed or performed in visit on 04/20/18   HCV RT-PCR,Quant(Non-Graph)   Result Value Ref Range    Hepatitis C Quantitation HCV Not Detected IU/mL    Test Information Comment    CBC (No Diff)   Result Value Ref Range    WBC 6.72 3.20 - 9.80 10*3/mm3    RBC 5.03 3.77 - 5.16 10*6/mm3    Hemoglobin 12.9 12.0 - 15.5 g/dL    Hematocrit 39.2 35.0 - 45.0 %    MCV 77.9 (L) 80.0 - 98.0 fL    MCH 25.6 (L) 26.5 - 34.0 pg    MCHC 32.9 31.4 - 36.0 g/dL    RDW 14.0 11.5 - 14.5 %    RDW-SD 39.4 36.4 - 46.3 fl    MPV 11.1 8.0 - 12.0 fL    Platelets 227 150 - 450 10*3/mm3   Results for orders placed or performed in visit on 03/30/18   Comprehensive Metabolic Panel   Result Value Ref Range    Glucose 118 (H) 60 - 100 mg/dL    BUN 14 7 - 21 mg/dL    Creatinine 0.58 0.50 - 1.00 mg/dL    Sodium 141 137 - 145 mmol/L    Potassium 3.9 3.5 - 5.1 mmol/L    Chloride 98 95 - 110 mmol/L    CO2 27.0 22.0 - 31.0 mmol/L    Calcium 9.5 8.4 - 10.2 mg/dL    Total Protein 7.4 6.3 - 8.6 g/dL    Albumin 4.30 3.40 - 4.80 g/dL    ALT (SGPT) 28 9 - 52 U/L    AST (SGOT) 18 14 - 36 U/L    Alkaline Phosphatase 125 38 - 126 U/L    Total Bilirubin 0.5 0.2 - 1.3 mg/dL    eGFR Non  Amer 106 51 - 120 mL/min/1.73    Globulin 3.1 2.3 - 3.5 gm/dL    A/G Ratio 1.4 1.1 - 1.8 g/dL    BUN/Creatinine Ratio 24.1 7.0 - 25.0    Anion Gap 16.0 (H) 5.0 - 15.0 mmol/L   Results for orders placed or performed in visit on 03/30/18   CBC Auto Differential   Result Value Ref Range    WBC 7.69 3.20 - 9.80 10*3/mm3    RBC 4.87 3.77 - 5.16 10*6/mm3    Hemoglobin 13.0 12.0 - 15.5 g/dL    Hematocrit 38.6 35.0 - 45.0 %    MCV 79.3 (L) 80.0 - 98.0 fL    MCH 26.7 26.5 - 34.0 pg    MCHC 33.7 31.4 - 36.0 g/dL    RDW 13.8 11.5 - 14.5 %    RDW-SD 39.6 36.4 -  46.3 fl    MPV 10.0 8.0 - 12.0 fL    Platelets 246 150 - 450 10*3/mm3    Neutrophil % 60.0 37.0 - 80.0 %    Lymphocyte % 29.4 10.0 - 50.0 %    Monocyte % 6.5 0.0 - 12.0 %    Eosinophil % 2.7 0.0 - 7.0 %    Basophil % 0.5 0.0 - 2.0 %    Immature Grans % 0.9 (H) 0.0 - 0.5 %    Neutrophils, Absolute 4.61 2.00 - 8.60 10*3/mm3    Lymphocytes, Absolute 2.26 0.60 - 4.20 10*3/mm3    Monocytes, Absolute 0.50 0.00 - 0.90 10*3/mm3    Eosinophils, Absolute 0.21 0.00 - 0.70 10*3/mm3    Basophils, Absolute 0.04 0.00 - 0.20 10*3/mm3    Immature Grans, Absolute 0.07 (H) 0.00 - 0.02 10*3/mm3    nRBC 0.0 0.0 - 0.0 /100 WBC   Results for orders placed or performed in visit on 03/16/18   HCV RT-PCR,Quant(Non-Graph)   Result Value Ref Range    Hepatitis C Quantitation HCV Not Detected IU/mL    Test Information Comment    HCV FibroSURE   Result Value Ref Range    Fibrosis Score 0.07 0.00 - 0.21    Fibrosis Stage Comment     Necroinflammat Activity Score 0.08 0.00 - 0.17    Necroinflammat Activity Grade A0-No activity     Alpha 2-Macroglobulins, Qn 207 110 - 276 mg/dL    Haptoglobin 231 (H) 34 - 200 mg/dL    Apolipoprotein A-1 114 (L) 116 - 209 mg/dL    Total Bilirubin 0.1 0.0 - 1.2 mg/dL    GGT 19 0 - 60 IU/L    ALT (SGPT) 25 0 - 40 IU/L    HCV Qual Interp Comment     Fibrosis Scoring: Comment     Necroinflamm Activity Scoring: Comment     Limitations: Comment     Comment Comment      *Note: Due to a large number of results and/or encounters for the requested time period, some results have not been displayed. A complete set of results can be found in Results Review.       Some portions of this note have been dictated using voice recognition software and may contain errors and/or omissions.

## 2018-07-10 ENCOUNTER — HOSPITAL ENCOUNTER (OUTPATIENT)
Dept: ULTRASOUND IMAGING | Facility: HOSPITAL | Age: 58
Discharge: HOME | End: 2018-07-10
Admitting: PHYSICIAN ASSISTANT

## 2018-07-10 ENCOUNTER — LAB (OUTPATIENT)
Dept: LAB | Facility: HOSPITAL | Age: 58
End: 2018-07-10

## 2018-07-10 DIAGNOSIS — B18.2 CHRONIC HEPATITIS C WITHOUT HEPATIC COMA (HCC): ICD-10-CM

## 2018-07-10 DIAGNOSIS — K21.00 GASTROESOPHAGEAL REFLUX DISEASE WITH ESOPHAGITIS: ICD-10-CM

## 2018-07-10 DIAGNOSIS — K75.81 NASH (NONALCOHOLIC STEATOHEPATITIS): ICD-10-CM

## 2018-07-10 LAB
ALBUMIN SERPL-MCNC: 4.7 G/DL (ref 3.4–4.8)
ALBUMIN/GLOB SERPL: 1.5 G/DL (ref 1.1–1.8)
ALP SERPL-CCNC: 132 U/L (ref 38–126)
ALT SERPL W P-5'-P-CCNC: 24 U/L (ref 9–52)
ANION GAP SERPL CALCULATED.3IONS-SCNC: 12 MMOL/L (ref 5–15)
AST SERPL-CCNC: 22 U/L (ref 14–36)
BASOPHILS # BLD AUTO: 0.05 10*3/MM3 (ref 0–0.2)
BASOPHILS NFR BLD AUTO: 0.7 % (ref 0–2)
BILIRUB SERPL-MCNC: 0.3 MG/DL (ref 0.2–1.3)
BUN BLD-MCNC: 13 MG/DL (ref 7–21)
BUN/CREAT SERPL: 32.5 (ref 7–25)
CALCIUM SPEC-SCNC: 9.8 MG/DL (ref 8.4–10.2)
CHLORIDE SERPL-SCNC: 102 MMOL/L (ref 95–110)
CO2 SERPL-SCNC: 27 MMOL/L (ref 22–31)
CREAT BLD-MCNC: 0.4 MG/DL (ref 0.5–1)
DEPRECATED RDW RBC AUTO: 44.1 FL (ref 36.4–46.3)
EOSINOPHIL # BLD AUTO: 0.39 10*3/MM3 (ref 0–0.7)
EOSINOPHIL NFR BLD AUTO: 5.5 % (ref 0–7)
ERYTHROCYTE [DISTWIDTH] IN BLOOD BY AUTOMATED COUNT: 15.5 % (ref 11.5–14.5)
GFR SERPL CREATININE-BSD FRML MDRD: 163 ML/MIN/1.73 (ref 51–120)
GLOBULIN UR ELPH-MCNC: 3.1 GM/DL (ref 2.3–3.5)
GLUCOSE BLD-MCNC: 178 MG/DL (ref 60–100)
HCT VFR BLD AUTO: 39.1 % (ref 35–45)
HGB BLD-MCNC: 13.1 G/DL (ref 12–15.5)
IMM GRANULOCYTES # BLD: 0.12 10*3/MM3 (ref 0–0.02)
IMM GRANULOCYTES NFR BLD: 1.7 % (ref 0–0.5)
INR PPP: 1.03 (ref 0.8–1.2)
LYMPHOCYTES # BLD AUTO: 2.04 10*3/MM3 (ref 0.6–4.2)
LYMPHOCYTES NFR BLD AUTO: 28.7 % (ref 10–50)
MCH RBC QN AUTO: 26.3 PG (ref 26.5–34)
MCHC RBC AUTO-ENTMCNC: 33.5 G/DL (ref 31.4–36)
MCV RBC AUTO: 78.4 FL (ref 80–98)
MONOCYTES # BLD AUTO: 0.59 10*3/MM3 (ref 0–0.9)
MONOCYTES NFR BLD AUTO: 8.3 % (ref 0–12)
NEUTROPHILS # BLD AUTO: 3.91 10*3/MM3 (ref 2–8.6)
NEUTROPHILS NFR BLD AUTO: 55.1 % (ref 37–80)
PLATELET # BLD AUTO: 226 10*3/MM3 (ref 150–450)
PMV BLD AUTO: 10.7 FL (ref 8–12)
POTASSIUM BLD-SCNC: 4.3 MMOL/L (ref 3.5–5.1)
PROT SERPL-MCNC: 7.8 G/DL (ref 6.3–8.6)
PROTHROMBIN TIME: 13.3 SECONDS (ref 11.1–15.3)
RBC # BLD AUTO: 4.99 10*6/MM3 (ref 3.77–5.16)
SODIUM BLD-SCNC: 141 MMOL/L (ref 137–145)
WBC NRBC COR # BLD: 7.1 10*3/MM3 (ref 3.2–9.8)

## 2018-07-10 PROCEDURE — 85025 COMPLETE CBC W/AUTO DIFF WBC: CPT

## 2018-07-10 PROCEDURE — 87522 HEPATITIS C REVRS TRNSCRPJ: CPT

## 2018-07-10 PROCEDURE — 76705 ECHO EXAM OF ABDOMEN: CPT

## 2018-07-10 PROCEDURE — 85610 PROTHROMBIN TIME: CPT

## 2018-07-10 PROCEDURE — 36415 COLL VENOUS BLD VENIPUNCTURE: CPT

## 2018-07-10 PROCEDURE — 80053 COMPREHEN METABOLIC PANEL: CPT

## 2018-07-10 PROCEDURE — 82105 ALPHA-FETOPROTEIN SERUM: CPT

## 2018-07-11 LAB
AFP-TM SERPL-MCNC: 2.6 NG/ML (ref 0–8.3)
HCV RNA SERPL NAA+PROBE-ACNC: NORMAL IU/ML
TEST INFORMATION: NORMAL

## 2018-08-02 ENCOUNTER — OFFICE VISIT (OUTPATIENT)
Dept: GASTROENTEROLOGY | Facility: CLINIC | Age: 58
End: 2018-08-02

## 2018-08-02 VITALS
HEIGHT: 63 IN | HEART RATE: 111 BPM | BODY MASS INDEX: 27.89 KG/M2 | WEIGHT: 157.4 LBS | DIASTOLIC BLOOD PRESSURE: 80 MMHG | SYSTOLIC BLOOD PRESSURE: 112 MMHG

## 2018-08-02 DIAGNOSIS — B18.2 CHRONIC HEPATITIS C WITHOUT HEPATIC COMA (HCC): Primary | ICD-10-CM

## 2018-08-02 DIAGNOSIS — R74.8 ELEVATED LIVER ENZYMES: ICD-10-CM

## 2018-08-02 DIAGNOSIS — K21.00 GASTROESOPHAGEAL REFLUX DISEASE WITH ESOPHAGITIS: ICD-10-CM

## 2018-08-02 DIAGNOSIS — K75.81 NASH (NONALCOHOLIC STEATOHEPATITIS): ICD-10-CM

## 2018-08-02 PROCEDURE — 99214 OFFICE O/P EST MOD 30 MIN: CPT | Performed by: PHYSICIAN ASSISTANT

## 2018-08-02 RX ORDER — GEMFIBROZIL 600 MG/1
600 TABLET, FILM COATED ORAL
COMMUNITY

## 2018-08-02 RX ORDER — PANTOPRAZOLE SODIUM 40 MG/1
40 TABLET, DELAYED RELEASE ORAL DAILY
COMMUNITY
End: 2018-11-13

## 2018-08-02 RX ORDER — FAMOTIDINE 40 MG/1
20 TABLET, FILM COATED ORAL DAILY
COMMUNITY
End: 2019-05-06 | Stop reason: DRUGHIGH

## 2018-08-02 NOTE — PROGRESS NOTES
Chief Complaint   Patient presents with   • Hepatitis C     finished 8 weeks Mavyret on 4-16-18   • Torres   • Heartburn       ENDO PROCEDURE ORDERED:    Subjective    Margueritejuan luis Rob is a 58 y.o. female. she is here today for follow-up.    History of Present Illness    HISTORY: Patient is seen on a recheck of her GERD, TORRES, hepatitis C. Last seen 05/02/2018. Genotype 2b/F0. The patient completed 8 weeks of Mavyret on 04/16/2018. She states she was told when she had a recent knee arthroscopy on 06/19/2018 at [facility] that she had ascites. She states they did not do any testing but told her because of her abdominal bloating that this was ascites.     Nevertheless she had studies on 07/10/2018: Right upper quadrant ultrasound showed a grossly negative liver that was fibrotic with postcholecystectomy with no ascites. INR 1.03, AFP was normal, CBC fairly normal. HCV RNA by PCR quantitative was not detected. CMP showed glucose 178, alkaline phosphatase 132, otherwise normal.    The patient still complains of abdominal distention. She is on Pepcid and Protonix for chronic heartburn. Denied nausea, vomiting, and dysphagia. Bowels are moving without blood or mucus. Weight is down 2 pounds since last visit. Last colonoscopy 02/08/2016.    ASSESSMENT AND PLAN: Patient with history of hepatitis C, hepatic fibrosis, TORRES, no evidence for ascites on recent imaging. Her liver disease was not predicted to be that bad. She should be improving after treatment with the Mavyret. I suggested followup in 6 months with HCV RNA by PCR quantitative, HCV FibroSure, CBC, CMP, and INR. Hepatoma screening would be due in July. We will see her in followup after the above, further pending clinical course and the results of the above.       The following portions of the patient's history were reviewed and updated as appropriate:   Past Medical History:   Diagnosis Date   • Alcoholic fatty liver    • Alkaline phosphatase raised    • Anxiety    •  Asthma     IgE-MEDIATED ALLERGIC ASTHMA   • Backache     CHRONIC   • CHF (congestive heart failure) (CMS/HCC)    • Chronic hepatitis C (CMS/HCC)     2b. Fibrosure .05/F0, necroinflam .14/A0. Repeat .05/F0, .13/A0      • Chronic neck pain    • Constipation    • COPD (chronic obstructive pulmonary disease) (CMS/HCC)    • Diabetes (CMS/HCC)    • Diarrhea    • Elevated levels of transaminase & lactic acid dehydrogenase    • Emphysema, unspecified    • Generalized abdominal pain    • GERD (gastroesophageal reflux disease)     WITH ESOPHAGITIS   • Hepatitis    • High risk sexual behavior    • Hypertension    • Hypokalemia    • Irritable bowel syndrome (IBS)    • Multiple joint pain    • Need for vaccination    • On long term drug therapy    • Shoulder pain    • Tobacco dependence syndrome      Past Surgical History:   Procedure Laterality Date   • APPENDECTOMY     • APPENDECTOMY     • BACK SURGERY     • CHOLECYSTECTOMY     • CHOLECYSTECTOMY     • COLONOSCOPY  02/08/2016   • COLONOSCOPY W/ POLYPECTOMY  02/08/2016    External and internal hemorrhoids.The examination was otherwise normal.Fluid aspiration performed.Several biopsies obtained in the entire colon.   • ENDOSCOPY  02/08/2016    Mildly severe esophagitis.Gastritis.Normal examined duodenum.Several biopsies obtained in the lower third of the esophagus.Several biopsies obtained in the gastric antrum.Several biopsies obtained in the first part of the duodenum.   • ENDOSCOPY AND COLONOSCOPY  08/13/2012    Internal & external hemorrhoids found. Scope could not pass into the TI.   • ENDOSCOPY W/ PEG TUBE PLACEMENT  08/13/2012    Esophagitis seen. Biopsy taken. Gastritis in stomach. Biopsy taken. Normal duodenum. Biopsy taken.   • HEMORRHOIDECTOMY     • HERNIA REPAIR     • INJECTION OF MEDICATION  08/01/2013    METHYLPREDNISONE, X2   • INJECTION OF MEDICATION  11/03/2011    KENALOG   • INJECTION OF MEDICATION  02/24/2011    ROCEPHIN   • KNEE SURGERY     • TONSILLECTOMY AND  ADENOIDECTOMY     • TOTAL ABDOMINAL HYSTERECTOMY WITH SALPINGO OOPHORECTOMY     • UPPER GASTROINTESTINAL ENDOSCOPY  02/08/2016     Family History   Problem Relation Age of Onset   • Coronary artery disease Mother    • Diabetes Mother    • Heart failure Mother    • Cancer Father    • Diabetes Father    • Heart failure Father    • Thyroid disease Father    • Breast cancer Sister    • Cancer Other         COLORECTAL   • Endometrial cancer Other    • Ovarian cancer Other    • Diabetes Brother      OB History     No data available        Allergies   Allergen Reactions   • Doxycycline Itching   • Penicillins Rash     Social History     Social History   • Marital status:      Social History Main Topics   • Smoking status: Current Every Day Smoker     Packs/day: 0.25     Types: Cigarettes   • Smokeless tobacco: Never Used   • Alcohol use No   • Drug use: No   • Sexual activity: Defer     Other Topics Concern   • Not on file       Current Outpatient Prescriptions:   •  albuterol (PROVENTIL HFA;VENTOLIN HFA) 108 (90 BASE) MCG/ACT inhaler, Inhale 2 puffs Every 4 (Four) Hours As Needed for wheezing., Disp: , Rfl:   •  albuterol (PROVENTIL) (5 MG/ML) 0.5% nebulizer solution, Take 2.5 mg by nebulization Every 6 (Six) Hours As Needed for wheezing., Disp: , Rfl:   •  amitriptyline (ELAVIL) 100 MG tablet, Take 200 mg by mouth Every Night., Disp: , Rfl:   •  amLODIPine (NORVASC) 5 MG tablet, Take 5 mg by mouth Daily., Disp: , Rfl:   •  benzonatate (TESSALON) 100 MG capsule, Take 100 mg by mouth 3 (Three) Times a Day As Needed for Cough., Disp: , Rfl:   •  budesonide-formoterol (SYMBICORT) 160-4.5 MCG/ACT inhaler, Inhale 2 puffs 2 (Two) Times a Day., Disp: , Rfl:   •  cetirizine (zyrTEC) 10 MG tablet, Take 10 mg by mouth Daily., Disp: , Rfl:   •  diphenoxylate-atropine (LOMOTIL) 2.5-0.025 MG per tablet, Take 1 tablet by mouth 4 (Four) Times a Day As Needed for Diarrhea., Disp: , Rfl:   •  famotidine (PEPCID) 40 MG tablet, Take  "40 mg by mouth Daily., Disp: , Rfl:   •  fluconazole (DIFLUCAN) 100 MG tablet, Take 100 mg by mouth 1 (One) Time Per Week., Disp: , Rfl:   •  fluticasone (FLONASE) 50 MCG/ACT nasal spray, 1 spray into each nostril Daily., Disp: , Rfl:   •  furosemide (LASIX) 20 MG tablet, Take 20 mg by mouth Daily., Disp: , Rfl:   •  gemfibrozil (LOPID) 600 MG tablet, Take 600 mg by mouth 2 (Two) Times a Day Before Meals., Disp: , Rfl:   •  HYDROcodone-acetaminophen (NORCO) 7.5-325 MG per tablet, Take 1 tablet by mouth 3 (Three) Times a Day., Disp: , Rfl:   •  JANUMET XR  MG tablet sustained-release 24 hour, Take 1 tablet by mouth 2 (Two) Times a Day., Disp: , Rfl:   •  montelukast (SINGULAIR) 10 MG tablet, Take 1 tablet by mouth Every Night., Disp: 30 tablet, Rfl: 11  •  pantoprazole (PROTONIX) 40 MG EC tablet, Take 40 mg by mouth Daily., Disp: , Rfl:   •  potassium chloride (K-DUR,KLOR-CON) 10 MEQ CR tablet, Take 10 mEq by mouth 2 (Two) Times a Day., Disp: , Rfl:   •  pravastatin (PRAVACHOL) 20 MG tablet, Take 20 mg by mouth Every Night., Disp: , Rfl:   •  sennosides-docusate sodium (SENOKOT-S) 8.6-50 MG tablet, Take 2 tablets by mouth Every Night., Disp: , Rfl:   Review of Systems  Review of Systems       Objective    /80 (BP Location: Left arm)   Pulse 111   Ht 160 cm (63\")   Wt 71.4 kg (157 lb 6.4 oz)   BMI 27.88 kg/m²   Physical Exam   Constitutional: She is oriented to person, place, and time. She appears well-developed and well-nourished. No distress.   HENT:   Head: Normocephalic and atraumatic.   Eyes: Pupils are equal, round, and reactive to light. EOM are normal.   Neck: Normal range of motion.   Cardiovascular: Normal rate, regular rhythm and normal heart sounds.    Pulmonary/Chest: Effort normal and breath sounds normal.   Abdominal: Soft. Bowel sounds are normal. She exhibits no shifting dullness, no distension, no abdominal bruit, no ascites and no mass. There is no hepatosplenomegaly. There is " tenderness. There is no rigidity, no rebound, no guarding and no CVA tenderness. No hernia. Hernia confirmed negative in the ventral area.   Mild diffuse   Musculoskeletal: Normal range of motion.   Neurological: She is alert and oriented to person, place, and time.   Skin: Skin is warm and dry.   Psychiatric: She has a normal mood and affect. Her behavior is normal. Judgment and thought content normal.   Nursing note and vitals reviewed.    Assessment/Plan      1. Chronic hepatitis C without hepatic coma (CMS/HCC)    2. TORRES (nonalcoholic steatohepatitis)    3. Gastroesophageal reflux disease with esophagitis    4. Elevated liver enzymes    .   Marguerite was seen today for hepatitis c, torres and heartburn.    Diagnoses and all orders for this visit:    Chronic hepatitis C without hepatic coma (CMS/HCC)  -     HCV RT-PCR,Quant(Non-Graph); Future  -     HCV FibroSURE; Future  -     Protime-INR; Future  -     Comprehensive Metabolic Panel; Future  -     CBC Auto Differential; Future    TORRES (nonalcoholic steatohepatitis)  -     HCV RT-PCR,Quant(Non-Graph); Future  -     HCV FibroSURE; Future  -     Protime-INR; Future  -     Comprehensive Metabolic Panel; Future  -     CBC Auto Differential; Future    Gastroesophageal reflux disease with esophagitis  -     HCV RT-PCR,Quant(Non-Graph); Future  -     HCV FibroSURE; Future  -     Protime-INR; Future  -     Comprehensive Metabolic Panel; Future  -     CBC Auto Differential; Future    Elevated liver enzymes  -     HCV RT-PCR,Quant(Non-Graph); Future  -     HCV FibroSURE; Future  -     Protime-INR; Future  -     Comprehensive Metabolic Panel; Future  -     CBC Auto Differential; Future        Orders placed during this encounter include:  Orders Placed This Encounter   Procedures   • HCV FibroSURE     Due before follow up in Jan     Standing Status:   Future     Standing Expiration Date:   1/31/2019   • Protime-INR     Due before follow up in Jan     Standing Status:   Future      Standing Expiration Date:   1/31/2019   • Comprehensive Metabolic Panel     Due before follow up in Jan     Standing Status:   Future     Standing Expiration Date:   1/31/2019   • CBC Auto Differential     Due before follow up in Jan     Standing Status:   Future     Standing Expiration Date:   1/31/2019       Medications prescribed:  No orders of the defined types were placed in this encounter.    Discontinued Medications       Reason for Discontinue    omeprazole (priLOSEC) 20 MG capsule *Therapy completed        Requested Prescriptions      No prescriptions requested or ordered in this encounter       Review and/or summary of lab tests, radiology, procedures, medications. Review and summary of old records and obtaining of history. The risks and benefits of my recommendations, as well as other treatment options were discussed with the patient today. Questions were answered.    Follow-up: Return in about 23 weeks (around 1/10/2019), or if symptoms worsen or fail to improve, for lab prior.     * Surgery not found *      This document has been electronically signed by Genaro Franco PA-C on August 3, 2018 4:53 PM      Results for orders placed or performed in visit on 07/10/18   HCV RT-PCR,Quant(Non-Graph)   Result Value Ref Range    Hepatitis C Quantitation HCV Not Detected IU/mL    Test Information Comment    CBC Auto Differential   Result Value Ref Range    WBC 7.10 3.20 - 9.80 10*3/mm3    RBC 4.99 3.77 - 5.16 10*6/mm3    Hemoglobin 13.1 12.0 - 15.5 g/dL    Hematocrit 39.1 35.0 - 45.0 %    MCV 78.4 (L) 80.0 - 98.0 fL    MCH 26.3 (L) 26.5 - 34.0 pg    MCHC 33.5 31.4 - 36.0 g/dL    RDW 15.5 (H) 11.5 - 14.5 %    RDW-SD 44.1 36.4 - 46.3 fl    MPV 10.7 8.0 - 12.0 fL    Platelets 226 150 - 450 10*3/mm3    Neutrophil % 55.1 37.0 - 80.0 %    Lymphocyte % 28.7 10.0 - 50.0 %    Monocyte % 8.3 0.0 - 12.0 %    Eosinophil % 5.5 0.0 - 7.0 %    Basophil % 0.7 0.0 - 2.0 %    Immature Grans % 1.7 (H) 0.0 - 0.5 %    Neutrophils,  Absolute 3.91 2.00 - 8.60 10*3/mm3    Lymphocytes, Absolute 2.04 0.60 - 4.20 10*3/mm3    Monocytes, Absolute 0.59 0.00 - 0.90 10*3/mm3    Eosinophils, Absolute 0.39 0.00 - 0.70 10*3/mm3    Basophils, Absolute 0.05 0.00 - 0.20 10*3/mm3    Immature Grans, Absolute 0.12 (H) 0.00 - 0.02 10*3/mm3   AFP Tumor Marker   Result Value Ref Range    AFP Tumor Marker 2.6 0.0 - 8.3 ng/mL   Protime-INR   Result Value Ref Range    Protime 13.3 11.1 - 15.3 Seconds    INR 1.03 0.80 - 1.20   Comprehensive Metabolic Panel   Result Value Ref Range    Glucose 178 (H) 60 - 100 mg/dL    BUN 13 7 - 21 mg/dL    Creatinine 0.40 (L) 0.50 - 1.00 mg/dL    Sodium 141 137 - 145 mmol/L    Potassium 4.3 3.5 - 5.1 mmol/L    Chloride 102 95 - 110 mmol/L    CO2 27.0 22.0 - 31.0 mmol/L    Calcium 9.8 8.4 - 10.2 mg/dL    Total Protein 7.8 6.3 - 8.6 g/dL    Albumin 4.70 3.40 - 4.80 g/dL    ALT (SGPT) 24 9 - 52 U/L    AST (SGOT) 22 14 - 36 U/L    Alkaline Phosphatase 132 (H) 38 - 126 U/L    Total Bilirubin 0.3 0.2 - 1.3 mg/dL    eGFR Non  Amer 163 (H) 51 - 120 mL/min/1.73    Globulin 3.1 2.3 - 3.5 gm/dL    A/G Ratio 1.5 1.1 - 1.8 g/dL    BUN/Creatinine Ratio 32.5 (H) 7.0 - 25.0    Anion Gap 12.0 5.0 - 15.0 mmol/L   Results for orders placed or performed in visit on 04/20/18   Lipoprotein Phenotyping   Result Value Ref Range    Total Cholesterol 186 100 - 199 mg/dL    Triglycerides 448 (H) 0 - 149 mg/dL    HDL Cholesterol 36 (L) >39 mg/dL    VLDL Cholesterol Comment 5 - 40 mg/dL    LDL Cholesterol  Comment 0 - 99 mg/dL    Lipoprotein Phenotype Comment    MicroAlbumin, Urine, Random - Urine, Clean Catch   Result Value Ref Range    Microalbumin, Urine <0.6 mg/L   TSH   Result Value Ref Range    TSH 2.520 0.460 - 4.680 mIU/mL   Hemoglobin A1c   Result Value Ref Range    Hemoglobin A1C 7.9 (H) 4 - 5.6 %   Comprehensive Metabolic Panel   Result Value Ref Range    Glucose 160 (H) 60 - 100 mg/dL    BUN 11 7 - 21 mg/dL    Creatinine 0.52 0.50 - 1.00 mg/dL     Sodium 140 137 - 145 mmol/L    Potassium 4.2 3.5 - 5.1 mmol/L    Chloride 101 95 - 110 mmol/L    CO2 23.0 22.0 - 31.0 mmol/L    Calcium 9.4 8.4 - 10.2 mg/dL    Total Protein 7.5 6.3 - 8.6 g/dL    Albumin 4.30 3.40 - 4.80 g/dL    ALT (SGPT) 33 9 - 52 U/L    AST (SGOT) 28 14 - 36 U/L    Alkaline Phosphatase 106 38 - 126 U/L    Total Bilirubin 0.1 (L) 0.2 - 1.3 mg/dL    eGFR Non  Amer 121 (H) 51 - 120 mL/min/1.73    Globulin 3.2 2.3 - 3.5 gm/dL    A/G Ratio 1.3 1.1 - 1.8 g/dL    BUN/Creatinine Ratio 21.2 7.0 - 25.0    Anion Gap 16.0 (H) 5.0 - 15.0 mmol/L   Results for orders placed or performed in visit on 04/20/18   HCV RT-PCR,Quant(Non-Graph)   Result Value Ref Range    Hepatitis C Quantitation HCV Not Detected IU/mL    Test Information Comment    CBC (No Diff)   Result Value Ref Range    WBC 6.72 3.20 - 9.80 10*3/mm3    RBC 5.03 3.77 - 5.16 10*6/mm3    Hemoglobin 12.9 12.0 - 15.5 g/dL    Hematocrit 39.2 35.0 - 45.0 %    MCV 77.9 (L) 80.0 - 98.0 fL    MCH 25.6 (L) 26.5 - 34.0 pg    MCHC 32.9 31.4 - 36.0 g/dL    RDW 14.0 11.5 - 14.5 %    RDW-SD 39.4 36.4 - 46.3 fl    MPV 11.1 8.0 - 12.0 fL    Platelets 227 150 - 450 10*3/mm3   Results for orders placed or performed in visit on 03/30/18   Comprehensive Metabolic Panel   Result Value Ref Range    Glucose 118 (H) 60 - 100 mg/dL    BUN 14 7 - 21 mg/dL    Creatinine 0.58 0.50 - 1.00 mg/dL    Sodium 141 137 - 145 mmol/L    Potassium 3.9 3.5 - 5.1 mmol/L    Chloride 98 95 - 110 mmol/L    CO2 27.0 22.0 - 31.0 mmol/L    Calcium 9.5 8.4 - 10.2 mg/dL    Total Protein 7.4 6.3 - 8.6 g/dL    Albumin 4.30 3.40 - 4.80 g/dL    ALT (SGPT) 28 9 - 52 U/L    AST (SGOT) 18 14 - 36 U/L    Alkaline Phosphatase 125 38 - 126 U/L    Total Bilirubin 0.5 0.2 - 1.3 mg/dL    eGFR Non  Amer 106 51 - 120 mL/min/1.73    Globulin 3.1 2.3 - 3.5 gm/dL    A/G Ratio 1.4 1.1 - 1.8 g/dL    BUN/Creatinine Ratio 24.1 7.0 - 25.0    Anion Gap 16.0 (H) 5.0 - 15.0 mmol/L     *Note: Due to a large  number of results and/or encounters for the requested time period, some results have not been displayed. A complete set of results can be found in Results Review.       Some portions of this note have been dictated using voice recognition software and may contain errors and/or omissions.

## 2018-08-02 NOTE — PATIENT INSTRUCTIONS
Food Choices for Gastroesophageal Reflux Disease, Adult  When you have gastroesophageal reflux disease (GERD), the foods you eat and your eating habits are very important. Choosing the right foods can help ease your discomfort.  What guidelines do I need to follow?  · Choose fruits, vegetables, whole grains, and low-fat dairy products.  · Choose low-fat meat, fish, and poultry.  · Limit fats such as oils, salad dressings, butter, nuts, and avocado.  · Keep a food diary. This helps you identify foods that cause symptoms.  · Avoid foods that cause symptoms. These may be different for everyone.  · Eat small meals often instead of 3 large meals a day.  · Eat your meals slowly, in a place where you are relaxed.  · Limit fried foods.  · Cook foods using methods other than frying.  · Avoid drinking alcohol.  · Avoid drinking large amounts of liquids with your meals.  · Avoid bending over or lying down until 2-3 hours after eating.  What foods are not recommended?  These are some foods and drinks that may make your symptoms worse:  Vegetables  Tomatoes. Tomato juice. Tomato and spaghetti sauce. Chili peppers. Onion and garlic. Horseradish.  Fruits  Oranges, grapefruit, and lemon (fruit and juice).  Meats  High-fat meats, fish, and poultry. This includes hot dogs, ribs, ham, sausage, salami, and javed.  Dairy  Whole milk and chocolate milk. Sour cream. Cream. Butter. Ice cream. Cream cheese.  Drinks  Coffee and tea. Bubbly (carbonated) drinks or energy drinks.  Condiments  Hot sauce. Barbecue sauce.  Sweets/Desserts  Chocolate and cocoa. Donuts. Peppermint and spearmint.  Fats and Oils  High-fat foods. This includes French fries and potato chips.  Other  Vinegar. Strong spices. This includes black pepper, white pepper, red pepper, cayenne, dolan powder, cloves, ginger, and chili powder.  The items listed above may not be a complete list of foods and drinks to avoid. Contact your dietitian for more information.  This  information is not intended to replace advice given to you by your health care provider. Make sure you discuss any questions you have with your health care provider.  Document Released: 06/18/2013 Document Revised: 05/25/2017 Document Reviewed: 10/22/2014  [employer] Interactive Patient Education © 2017 [employer]

## 2018-08-15 ENCOUNTER — OFFICE VISIT (OUTPATIENT)
Dept: PULMONOLOGY | Facility: CLINIC | Age: 58
End: 2018-08-15

## 2018-08-15 VITALS
BODY MASS INDEX: 27.8 KG/M2 | HEART RATE: 100 BPM | SYSTOLIC BLOOD PRESSURE: 126 MMHG | HEIGHT: 63 IN | OXYGEN SATURATION: 97 % | WEIGHT: 156.9 LBS | DIASTOLIC BLOOD PRESSURE: 74 MMHG

## 2018-08-15 DIAGNOSIS — F17.210 CIGARETTE NICOTINE DEPENDENCE, UNCOMPLICATED: ICD-10-CM

## 2018-08-15 DIAGNOSIS — J30.89 CHRONIC NONSEASONAL ALLERGIC RHINITIS DUE TO POLLEN: ICD-10-CM

## 2018-08-15 DIAGNOSIS — J44.9 COPD WITH ASTHMA (HCC): Primary | ICD-10-CM

## 2018-08-15 PROCEDURE — 99406 BEHAV CHNG SMOKING 3-10 MIN: CPT | Performed by: INTERNAL MEDICINE

## 2018-08-15 PROCEDURE — 99214 OFFICE O/P EST MOD 30 MIN: CPT | Performed by: INTERNAL MEDICINE

## 2018-08-15 RX ORDER — OMEPRAZOLE 40 MG/1
40 CAPSULE, DELAYED RELEASE ORAL DAILY
COMMUNITY
End: 2019-02-27

## 2018-08-15 NOTE — PROGRESS NOTES
Pulmonary Office Follow-up    Subjective     Marguerite Rob is seen today at the office for   Chief Complaint   Patient presents with   • COPD     6 MO F/U   • Asthma         HPI  Marguerite Rob is a 58 y.o. female with a PMH significant for COPD/ asthma, tobacco use, and CHF who presents for follow-up of COPD. she was last seen on 2/15/18, at which time she continued to do well on Symbicort twice daily as well as her allergy regimen of Flonase, Singulair, and Zyrtec.  I counseled her on complete tobacco cessation and recommended that she undergo lung cancer screening.  She reports that she is doing fairly well, but was just notified that her insurance will no longer cover Symbicort.  She does admit to congestion as well as cough productive of brownish phlegm which is worse in the mornings when she first gets up.  She has not felt the need to use her albuterol in several months.  Unfortunately, the patient does continue to smoke and is smoking 0.5-1 pack a day.  She understands the need to quit, but she is not yet motivated to go forward with quitting.  Patient denies fever, chills, chest pain, or leg swelling.  She has not had any recent exacerbations or steroid use.  She states that she was not aware that she had a CT scheduled back in February and is uncertain as to why it was canceled for financial reasons.    Patient Active Problem List   Diagnosis   • COPD (chronic obstructive pulmonary disease) (CMS/[facility])   • Cigarette nicotine dependence, uncomplicated   • Lung nodule   • Chronic allergic rhinitis   • COPD with asthma (CMS/[facility])   • Chest pain   • Dyspnea   • Benign essential HTN   • Mixed dyslipidemia   • TORRES (nonalcoholic steatohepatitis)   • GERD with esophagitis   • DM (diabetes mellitus) (CMS/[facility])   • Mold exposure   • Smoker       Review of Systems  Review of Systems   Constitutional: Negative for fatigue and fever.   HENT: Negative for congestion and postnasal drip.    Respiratory: Positive for  cough and shortness of breath. Negative for wheezing.    Cardiovascular: Negative for chest pain and leg swelling.   Musculoskeletal: Positive for back pain.     As described in the HPI. Otherwise, remainder of ROS (14 systems) were negative.    Medications, Allergies, Social, and Family Histories reviewed as per EMR.    Objective     Vitals:    08/15/18 0947   BP: 126/74   Pulse: 100   SpO2: 97%     Physical Exam   Constitutional: She is oriented to person, place, and time. Vital signs are normal. She appears well-developed and well-nourished.   HENT:   Head: Normocephalic and atraumatic.   Nose: No mucosal edema.   Mouth/Throat: Uvula is midline, oropharynx is clear and moist and mucous membranes are normal.   Mallampati 2   Eyes: Pupils are equal, round, and reactive to light. Conjunctivae, EOM and lids are normal.   Neck: Trachea normal and normal range of motion. No tracheal tenderness present. No thyroid mass present.   Cardiovascular: Normal rate, regular rhythm and normal heart sounds.  Exam reveals no gallop.    No murmur heard.  Pulmonary/Chest: Effort normal. No respiratory distress. She has no wheezes. She has no rhonchi. She exhibits no tenderness.   Abdominal: Soft. Normal appearance and bowel sounds are normal. She exhibits no distension. There is no hepatomegaly. There is no tenderness.   Musculoskeletal:   Normal gait, no extremity edema     Vascular Status -  Her right foot exhibits no edema. Her left foot exhibits no edema.  Lymphadenopathy:        Head (right side): No submandibular adenopathy present.        Head (left side): No submandibular adenopathy present.     She has no cervical adenopathy.        Right: No supraclavicular adenopathy present.        Left: No supraclavicular adenopathy present.   Neurological: She is alert and oriented to person, place, and time.   Skin: Skin is warm and dry.   Psychiatric: She has a normal mood and affect. Her speech is normal and behavior is normal.  Judgment normal.   Nursing note and vitals reviewed.    IMAGING: CT chest 2/13/17 (independently reviewed and interpreted by me) showed 4mm RLL subpleural nodule and scattered lower lobe blebs as well as a fatty liver.      Assessment/Plan     Marguerite was seen today for copd and asthma.    Diagnoses and all orders for this visit:    COPD with asthma (CMS/[facility])  -     mometasone-formoterol (DULERA 100) 100-5 MCG/ACT inhaler; Inhale 2 puffs 2 (Two) Times a Day.    Cigarette nicotine dependence, uncomplicated  -     CT Chest Low Dose Wo; Future    Chronic nonseasonal allergic rhinitis due to pollen         Discussion/ Recommendations:   She has been doing well on Symbicort but due to a formulary changes her insurance will need to change this to Dulera.  I think her ongoing cough and congestion is more related to her continued tobacco use causing chronic bronchitis.  Her chronic rhinitis is relatively well controlled.    -Start Dulera 2 puffs twice daily after completing current Symbicort inhaler.  -Use albuterol as needed.  Okay to try neb treatment for congestion.  -Continue Flonase, singulair and zyrtec.  -Encouraged frequent nasal saline.    -I advised Marguerite of the risks of continuing to use tobacco, and I provided her with tobacco cessation educational materials in the After Visit Summary.  Counseled her on the risks of exposing her young grandchildren.  During this visit, I spent 3 minutes counseling the patient regarding tobacco cessation.  -Encouraged her to reschedule her LD CT. She is to call approximately 2 days following her CT to receive results.  -Recommended that she get her flu vaccine this fall.    Patient's Body mass index is 27.79 kg/m². BMI is within normal parameters. No follow-up required.      Return in about 3 months (around 11/15/2018) for Recheck COPD.          This document has been electronically signed by Denisse Marcano MD on August 15, 2018 10:22 AM      Dragon dictation used

## 2018-08-27 ENCOUNTER — HOSPITAL ENCOUNTER (OUTPATIENT)
Dept: CT IMAGING | Facility: HOSPITAL | Age: 58
Discharge: HOME | End: 2018-08-27
Attending: INTERNAL MEDICINE | Admitting: INTERNAL MEDICINE

## 2018-08-27 DIAGNOSIS — F17.210 CIGARETTE NICOTINE DEPENDENCE, UNCOMPLICATED: ICD-10-CM

## 2018-08-27 PROCEDURE — G0297 LDCT FOR LUNG CA SCREEN: HCPCS

## 2018-09-11 ENCOUNTER — TELEPHONE (OUTPATIENT)
Dept: PULMONOLOGY | Facility: CLINIC | Age: 58
End: 2018-09-11

## 2018-09-11 NOTE — TELEPHONE ENCOUNTER
PATIENT HAS BEEN INFORMED NO NODULES FOUND - REPEAT CT CHEST LOW DOSE IN 1 YEAR (08/27/2019) PER DR. MARCANO    ----- Message from Nissa Griffin sent at 9/10/2018 11:41 AM CDT -----  Contact: [phone]  She left a msg on [employer] about needing some sort of results from Dr. Marcano

## 2018-11-13 ENCOUNTER — OFFICE VISIT (OUTPATIENT)
Dept: PULMONOLOGY | Facility: CLINIC | Age: 58
End: 2018-11-13

## 2018-11-13 VITALS
DIASTOLIC BLOOD PRESSURE: 82 MMHG | BODY MASS INDEX: 27.89 KG/M2 | WEIGHT: 157.4 LBS | OXYGEN SATURATION: 97 % | HEIGHT: 63 IN | HEART RATE: 108 BPM | SYSTOLIC BLOOD PRESSURE: 128 MMHG

## 2018-11-13 DIAGNOSIS — J44.9 COPD WITH ASTHMA (HCC): Primary | Chronic | ICD-10-CM

## 2018-11-13 DIAGNOSIS — J30.9 CHRONIC ALLERGIC RHINITIS: ICD-10-CM

## 2018-11-13 DIAGNOSIS — E66.3 OVERWEIGHT (BMI 25.0-29.9): ICD-10-CM

## 2018-11-13 DIAGNOSIS — F17.210 CIGARETTE NICOTINE DEPENDENCE, UNCOMPLICATED: ICD-10-CM

## 2018-11-13 PROBLEM — R91.1 LUNG NODULE: Status: RESOLVED | Noted: 2017-02-09 | Resolved: 2018-11-13

## 2018-11-13 PROCEDURE — 99214 OFFICE O/P EST MOD 30 MIN: CPT | Performed by: INTERNAL MEDICINE

## 2018-11-13 PROCEDURE — 99406 BEHAV CHNG SMOKING 3-10 MIN: CPT | Performed by: INTERNAL MEDICINE

## 2018-11-13 RX ORDER — GLYBURIDE 2.5 MG/1
2.5 TABLET ORAL 2 TIMES DAILY WITH MEALS
COMMUNITY

## 2018-11-13 NOTE — PROGRESS NOTES
Pulmonary Office Follow-up    Subjective     Marguerite Rob is seen today at the office for   Chief Complaint   Patient presents with   • COPD     3 month         HPI  Marguerite Rob is a 58 y.o. female with a PMH significant for COPD/ asthma, tobacco use, and CHF who presents for follow-up of COPD. She was last seen on 8/15/18, at which time I changed her from Symbicort to Dulera for insurance reasons.  I also counseled her the importance of complete tobacco cessation and recommended that she reschedule her lung cancer screening CT. Pt states she feels like the Dulera is working well as she does not need to use her albuterol. She does report when she coughs up sputum in the mornings she has noticed specks of blood but she attributes it to her sinuses as she has had some blood when she blows her nose.  She continues using Flonase, cetirizine, and montelukast daily, but she is not using saline regularly. Pt reports she was diagnosed with pneumonia 2 weeks ago and she was treated with 7d of levaquin.  She states her cough is back to baseline, but she denies any wheeze, chest pain, fevers, or weight changes.  She does continue to smoke <0.5ppd. Pt is pre-contemplative, but she admits that sometimes she does want to quit.  She has not gotten a flu vaccine yet.    Patient Active Problem List   Diagnosis   • Cigarette nicotine dependence, uncomplicated   • Chronic allergic rhinitis   • COPD with asthma (CMS/HCC)   • Chest pain   • Dyspnea   • Benign essential HTN   • Mixed dyslipidemia   • TORRES (nonalcoholic steatohepatitis)   • GERD with esophagitis   • DM (diabetes mellitus) (CMS/[facility])   • Mold exposure   • Smoker       Review of Systems  Review of Systems   Constitutional: Negative for fatigue, fever and unexpected weight change.   HENT: Positive for nosebleeds. Negative for postnasal drip.    Respiratory: Positive for cough and shortness of breath. Negative for wheezing.    Cardiovascular: Negative for chest pain  and leg swelling.   Musculoskeletal: Positive for back pain.     As described in the HPI. Otherwise, remainder of ROS (14 systems) were negative.    Medications, Allergies, Social, and Family Histories reviewed as per EMR.    Objective     Vitals:    11/13/18 1021   BP: 128/82   Pulse: 108   SpO2: 97%     Physical Exam   Constitutional: She is oriented to person, place, and time. Vital signs are normal. She appears well-developed and well-nourished.   HENT:   Head: Normocephalic and atraumatic.   Nose: No mucosal edema or rhinorrhea.   Mouth/Throat: Uvula is midline, oropharynx is clear and moist and mucous membranes are normal.   Mallampati 2   Eyes: Conjunctivae, EOM and lids are normal. Pupils are equal, round, and reactive to light.   Neck: Trachea normal and normal range of motion. No tracheal tenderness present. No thyroid mass present.   Cardiovascular: Normal rate, regular rhythm and normal heart sounds. Exam reveals no gallop.   No murmur heard.  Pulmonary/Chest: Effort normal. No respiratory distress. She has no wheezes. She has no rhonchi. She exhibits no tenderness.   Coarse bilateral breath sounds clear with cough   Abdominal: Soft. Normal appearance and bowel sounds are normal. She exhibits no distension. There is no hepatomegaly. There is no tenderness.   Musculoskeletal:   Normal gait, no extremity edema     Vascular Status -  Her right foot exhibits no edema. Her left foot exhibits no edema.  Lymphadenopathy:        Head (right side): No submandibular adenopathy present.        Head (left side): No submandibular adenopathy present.     She has no cervical adenopathy.        Right: No supraclavicular adenopathy present.        Left: No supraclavicular adenopathy present.   Neurological: She is alert and oriented to person, place, and time.   Skin: Skin is warm and dry.   Psychiatric: She has a normal mood and affect. Her speech is normal and behavior is normal. Judgment normal.   Nursing note and  vitals reviewed.    IMAGING: LDCT 8/27/18 (independently reviewed and interpreted by me) showed emphysematous changes, no nodules      Assessment/Plan     Marguerite was seen today for copd.    Diagnoses and all orders for this visit:    COPD with asthma (CMS/[facility])    Chronic allergic rhinitis    Cigarette nicotine dependence, uncomplicated    Overweight (BMI 25.0-29.9)         Discussion/ Recommendations:   She continues to do well on her ICS LABA with infrequent albuterol use.  Unfortunately, it sounds like she did develop pneumonia versus bronchitis recently but has returned back to baseline after treatment with Levaquin.  I do not have her chest x-ray images nor report to assess if she truly had an infiltrate.  Her chronic rhinitis her main stable but I think she would benefit from more regular use of saline to prevent epistaxis.  I think the mild hemoptysis she is experiencing is likely related to bronchitis as her recent LD CT did not show any concerning masses.  Unfortunately, she does continue to smoke and is pre-contemplative regarding complete cessation.    -Continue Dulera twice daily.  -Use albuterol as needed for dyspnea or wheeze.  Okay to try neb treatment for congestion.  -Recommended she start using nasal saline at least twice daily.  -Continue Flonase, singulair and zyrtec.  -I advised Marguerite of the risks of continuing to use tobacco, and I provided her with tobacco cessation educational materials in the After Visit Summary.  Again, stressed importance of complete cessation.  Encouraged her to consider picking her birthday is a quit date.  She is pre-contemplative.  During this visit, I spent 3 minutes counseling the patient regarding tobacco cessation.  -Annual LDCT August 2019.  -Recommended that she the flu vaccine as soon as possible.    Patient's Body mass index is 27.88 kg/m². BMI is above normal parameters. Recommendations include: exercise counseling.      Return in about 3 months (around  2/13/2019) for Recheck.          This document has been electronically signed by Denisse Marcano MD on November 13, 2018 10:44 AM      Chavaon dictation used

## 2018-12-04 ENCOUNTER — APPOINTMENT (OUTPATIENT)
Dept: ULTRASOUND IMAGING | Facility: HOSPITAL | Age: 58
End: 2018-12-04

## 2018-12-06 ENCOUNTER — HOSPITAL ENCOUNTER (OUTPATIENT)
Dept: ULTRASOUND IMAGING | Facility: HOSPITAL | Age: 58
Discharge: HOME | End: 2018-12-06
Admitting: OTOLARYNGOLOGY

## 2018-12-06 DIAGNOSIS — E04.2 MULTIPLE THYROID NODULES: ICD-10-CM

## 2018-12-06 PROCEDURE — 76536 US EXAM OF HEAD AND NECK: CPT

## 2018-12-10 ENCOUNTER — OFFICE VISIT (OUTPATIENT)
Dept: OTOLARYNGOLOGY | Facility: CLINIC | Age: 58
End: 2018-12-10

## 2018-12-10 VITALS — TEMPERATURE: 97.2 F | WEIGHT: 159 LBS | HEIGHT: 63 IN | BODY MASS INDEX: 28.17 KG/M2

## 2018-12-10 DIAGNOSIS — E04.2 MULTIPLE THYROID NODULES: Primary | ICD-10-CM

## 2018-12-10 PROCEDURE — 99213 OFFICE O/P EST LOW 20 MIN: CPT | Performed by: OTOLARYNGOLOGY

## 2018-12-10 NOTE — PROGRESS NOTES
Subjective   Marguerite Luz Maria Rob is a 58 y.o. female.     Thyroid nodules  History of Present Illness     Should comes in follow-up for her ultrasound comparison previous thyroid nodules.  Says she's not had any major health problems since she gets sick sometimes with her grandchildren and she takes care of on a regular basis.  She's not any major trouble with neck masses, swallowing sore throat she otherwise doing well    The following portions of the patient's history were reviewed and updated as appropriate: allergies, current medications, past family history, past medical history, past social history, past surgical history and problem list.      Current Outpatient Medications:   •  albuterol (PROVENTIL HFA;VENTOLIN HFA) 108 (90 BASE) MCG/ACT inhaler, Inhale 2 puffs Every 4 (Four) Hours As Needed for wheezing., Disp: , Rfl:   •  albuterol (PROVENTIL) (5 MG/ML) 0.5% nebulizer solution, Take 2.5 mg by nebulization Every 6 (Six) Hours As Needed for wheezing., Disp: , Rfl:   •  amitriptyline (ELAVIL) 100 MG tablet, Take 200 mg by mouth Every Night., Disp: , Rfl:   •  amLODIPine (NORVASC) 5 MG tablet, Take 5 mg by mouth Daily., Disp: , Rfl:   •  benzonatate (TESSALON) 100 MG capsule, Take 100 mg by mouth 3 (Three) Times a Day As Needed for Cough., Disp: , Rfl:   •  cetirizine (zyrTEC) 10 MG tablet, Take 10 mg by mouth Daily., Disp: , Rfl:   •  diphenoxylate-atropine (LOMOTIL) 2.5-0.025 MG per tablet, Take 1 tablet by mouth 4 (Four) Times a Day As Needed for Diarrhea., Disp: , Rfl:   •  famotidine (PEPCID) 40 MG tablet, Take 40 mg by mouth Daily., Disp: , Rfl:   •  fluconazole (DIFLUCAN) 100 MG tablet, Take 100 mg by mouth 1 (One) Time Per Week., Disp: , Rfl:   •  fluticasone (FLONASE) 50 MCG/ACT nasal spray, 1 spray into each nostril Daily., Disp: , Rfl:   •  furosemide (LASIX) 20 MG tablet, Take 20 mg by mouth Daily., Disp: , Rfl:   •  gemfibrozil (LOPID) 600 MG tablet, Take 600 mg by mouth 2 (Two) Times a Day Before  Meals., Disp: , Rfl:   •  glyBURIDE (DIAbeta) 2.5 MG tablet, Take 2.5 mg by mouth Daily With Breakfast., Disp: , Rfl:   •  HYDROcodone-acetaminophen (NORCO) 7.5-325 MG per tablet, Take 1 tablet by mouth 3 (Three) Times a Day., Disp: , Rfl:   •  JANUMET XR  MG tablet sustained-release 24 hour, Take 1 tablet by mouth 2 (Two) Times a Day., Disp: , Rfl:   •  mometasone-formoterol (DULERA 100) 100-5 MCG/ACT inhaler, Inhale 2 puffs 2 (Two) Times a Day., Disp: 1 inhaler, Rfl: 11  •  montelukast (SINGULAIR) 10 MG tablet, Take 1 tablet by mouth Every Night., Disp: 30 tablet, Rfl: 11  •  omeprazole (priLOSEC) 40 MG capsule, Take 40 mg by mouth Daily., Disp: , Rfl:   •  potassium chloride (K-DUR,KLOR-CON) 10 MEQ CR tablet, Take 10 mEq by mouth 2 (Two) Times a Day., Disp: , Rfl:   •  pravastatin (PRAVACHOL) 20 MG tablet, Take 20 mg by mouth Every Night., Disp: , Rfl:   •  sennosides-docusate sodium (SENOKOT-S) 8.6-50 MG tablet, Take 2 tablets by mouth Every Night., Disp: , Rfl:     Allergies   Allergen Reactions   • Doxycycline Itching   • Penicillins Rash             Review of Systems   Constitutional: Negative for fever.   HENT: Negative for trouble swallowing and voice change.    Respiratory: Negative.    Hematological: Negative for adenopathy.           Objective   Physical Exam   Constitutional: She appears well-developed.   HENT:   Head: Normocephalic.   Right Ear: Hearing, tympanic membrane, external ear and ear canal normal.   Left Ear: Hearing, tympanic membrane, external ear and ear canal normal.   Nose: Nose normal.   Mouth/Throat: Uvula is midline and oropharynx is clear and moist.   Eyes: Conjunctivae are normal.   Neck: Normal range of motion. JVD: no adenopathy or mass noted. No thyroid mass and no thyromegaly present.       Pulmonary/Chest: Effort normal.   Neurological: She is alert.   Skin: Skin is warm.   Psychiatric: She has a normal mood and affect.     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     FINDINGS: Realtime grayscale and color-flow imaging is obtained  of the thyroid gland. The thyroid is normal in size with slightly  heterogeneous echotexture. The right lobe measures 3.7 x 1.2 x  2.1 cm. The left lobe measures 4.2 x 1.2 x 1.6 cm.     Spongiform nodule in the upper pole of the right lobe measures 4  x 3 x 3 mm.  A cystic and solid nodule in superior pole of the left lobe  measures 3 x 2 x 3 mm. Both of these nodules appear stable.     There is a hypoechoic solid nodule in the mid left lobe measuring  4 x 2 x 3 mm, not seen on the prior exam.     None of these nodules contains internal echogenic foci.     IMPRESSION:  CONCLUSION:    Tiny bilateral thyroid nodules as described above which do not  meet ACR TI-RADS criteria for imaging follow-up.      Electronically signed by:  Irvin Rowley MD  12/7/2018 9:49 AM CST  Workstation      Assessment/Plan   Marguerite was seen today for follow-up.    Diagnoses and all orders for this visit:    Multiple thyroid nodules      Reviewed the ultrasound in detail with her and discuss implications.    I offered to do another revealed junk 2 years or she can call for questions she don't see new problems swallowing neck discomfort voice changes she is to let us know.  Otherwise will see her as needed and she agreed with this will call for questions or problems the meantime.

## 2019-02-04 ENCOUNTER — APPOINTMENT (OUTPATIENT)
Dept: LAB | Facility: HOSPITAL | Age: 59
End: 2019-02-04

## 2019-02-04 ENCOUNTER — OFFICE VISIT (OUTPATIENT)
Dept: GASTROENTEROLOGY | Facility: CLINIC | Age: 59
End: 2019-02-04

## 2019-02-04 VITALS
HEART RATE: 107 BPM | DIASTOLIC BLOOD PRESSURE: 90 MMHG | WEIGHT: 163 LBS | HEIGHT: 63 IN | SYSTOLIC BLOOD PRESSURE: 142 MMHG | BODY MASS INDEX: 28.88 KG/M2

## 2019-02-04 DIAGNOSIS — B18.2 CHRONIC HEPATITIS C WITHOUT HEPATIC COMA (HCC): Primary | ICD-10-CM

## 2019-02-04 DIAGNOSIS — R74.8 ELEVATED LIVER ENZYMES: ICD-10-CM

## 2019-02-04 DIAGNOSIS — K75.81 NASH (NONALCOHOLIC STEATOHEPATITIS): ICD-10-CM

## 2019-02-04 DIAGNOSIS — K21.00 GASTROESOPHAGEAL REFLUX DISEASE WITH ESOPHAGITIS: ICD-10-CM

## 2019-02-04 LAB
ALBUMIN SERPL-MCNC: 4.5 G/DL (ref 3.4–4.8)
ALBUMIN/GLOB SERPL: 1.4 G/DL (ref 1.1–1.8)
ALP SERPL-CCNC: 114 U/L (ref 38–126)
ALT SERPL W P-5'-P-CCNC: 38 U/L (ref 9–52)
ANION GAP SERPL CALCULATED.3IONS-SCNC: 14 MMOL/L (ref 5–15)
AST SERPL-CCNC: 33 U/L (ref 14–36)
BASOPHILS # BLD AUTO: 0.03 10*3/MM3 (ref 0–0.2)
BASOPHILS NFR BLD AUTO: 0.4 % (ref 0–2)
BILIRUB SERPL-MCNC: 0.1 MG/DL (ref 0.2–1.3)
BUN BLD-MCNC: 12 MG/DL (ref 7–21)
BUN/CREAT SERPL: 23.1 (ref 7–25)
CALCIUM SPEC-SCNC: 8.9 MG/DL (ref 8.4–10.2)
CHLORIDE SERPL-SCNC: 100 MMOL/L (ref 95–110)
CO2 SERPL-SCNC: 24 MMOL/L (ref 22–31)
CREAT BLD-MCNC: 0.52 MG/DL (ref 0.5–1)
DEPRECATED RDW RBC AUTO: 40.6 FL (ref 36.4–46.3)
EOSINOPHIL # BLD AUTO: 0.3 10*3/MM3 (ref 0–0.7)
EOSINOPHIL NFR BLD AUTO: 3.8 % (ref 0–7)
ERYTHROCYTE [DISTWIDTH] IN BLOOD BY AUTOMATED COUNT: 14.3 % (ref 11.5–14.5)
GFR SERPL CREATININE-BSD FRML MDRD: 121 ML/MIN/1.73 (ref 51–120)
GLOBULIN UR ELPH-MCNC: 3.2 GM/DL (ref 2.3–3.5)
GLUCOSE BLD-MCNC: 132 MG/DL (ref 60–100)
HCT VFR BLD AUTO: 37.7 % (ref 35–45)
HGB BLD-MCNC: 12.7 G/DL (ref 12–15.5)
IMM GRANULOCYTES # BLD AUTO: 0.05 10*3/MM3 (ref 0–0.02)
IMM GRANULOCYTES NFR BLD AUTO: 0.6 % (ref 0–0.5)
INR PPP: 1 (ref 0.8–1.2)
LYMPHOCYTES # BLD AUTO: 2 10*3/MM3 (ref 0.6–4.2)
LYMPHOCYTES NFR BLD AUTO: 25.4 % (ref 10–50)
MCH RBC QN AUTO: 26.5 PG (ref 26.5–34)
MCHC RBC AUTO-ENTMCNC: 33.7 G/DL (ref 31.4–36)
MCV RBC AUTO: 78.5 FL (ref 80–98)
MONOCYTES # BLD AUTO: 0.62 10*3/MM3 (ref 0–0.9)
MONOCYTES NFR BLD AUTO: 7.9 % (ref 0–12)
NEUTROPHILS # BLD AUTO: 4.88 10*3/MM3 (ref 2–8.6)
NEUTROPHILS NFR BLD AUTO: 61.9 % (ref 37–80)
PLATELET # BLD AUTO: 273 10*3/MM3 (ref 150–450)
PMV BLD AUTO: 10.2 FL (ref 8–12)
POTASSIUM BLD-SCNC: 4 MMOL/L (ref 3.5–5.1)
PROT SERPL-MCNC: 7.7 G/DL (ref 6.3–8.6)
PROTHROMBIN TIME: 13 SECONDS (ref 11.1–15.3)
RBC # BLD AUTO: 4.8 10*6/MM3 (ref 3.77–5.16)
SODIUM BLD-SCNC: 138 MMOL/L (ref 137–145)
WBC NRBC COR # BLD: 7.88 10*3/MM3 (ref 3.2–9.8)

## 2019-02-04 PROCEDURE — 80053 COMPREHEN METABOLIC PANEL: CPT | Performed by: PHYSICIAN ASSISTANT

## 2019-02-04 PROCEDURE — 36415 COLL VENOUS BLD VENIPUNCTURE: CPT | Performed by: PHYSICIAN ASSISTANT

## 2019-02-04 PROCEDURE — 99213 OFFICE O/P EST LOW 20 MIN: CPT | Performed by: PHYSICIAN ASSISTANT

## 2019-02-04 PROCEDURE — 85025 COMPLETE CBC W/AUTO DIFF WBC: CPT | Performed by: PHYSICIAN ASSISTANT

## 2019-02-04 PROCEDURE — 87522 HEPATITIS C REVRS TRNSCRPJ: CPT | Performed by: PHYSICIAN ASSISTANT

## 2019-02-04 PROCEDURE — 85610 PROTHROMBIN TIME: CPT | Performed by: PHYSICIAN ASSISTANT

## 2019-02-04 PROCEDURE — 81596 NFCT DS CHRNC HCV 6 ASSAYS: CPT | Performed by: PHYSICIAN ASSISTANT

## 2019-02-04 NOTE — PROGRESS NOTES
Chief Complaint   Patient presents with   • Heartburn   • Torres   • Hepatitis C     finished 8 weeks Mavyret on 4-16-18       ENDO PROCEDURE ORDERED:    Subjective    Margueritejuan luis Rob is a 58 y.o. female. she is here today for follow-up.    History of Present Illness    Patient seen on a recheck of her GERD, TORRES, chronic active hepatitis C. Last seen 08/21/2018. Genotype 2b/F0. The patient completed 8 weeks of Mavyret on 04/16/2018. She did not repeat her laboratories. She states she has been under a lot of stress. Her daughter was recently diagnosed as having schizophrenia and she is trying to help her get on medications. The only testing she has had since last visit was a negative low-dose CT scan of the chest on 08/27/2018.     Patient currently denies abdominal pain, GERD. She is doing well on Pepcid and Prilosec. She denied nausea, vomiting, dysphagia. She does take Senokot as needed for constipation. Bowels are moving without blood or mucus. Weight is down 5.4 pounds since last visit. Last colonoscopy 02/08/2016.      ASSESSMENT AND PLAN: Patient with history of chronic hepatitis C. She is due for 6-month follow-up studies and rescheduled her HCV RNA by PCR quantitative, HCV FibroSURE, CBC, CMP, and INR. We will see her in followup after the above. Hepatoma screening due in 071/2019, further pending clinical course and the results of the above.       The following portions of the patient's history were reviewed and updated as appropriate:   Past Medical History:   Diagnosis Date   • Alcoholic fatty liver    • Alkaline phosphatase raised    • Anxiety    • Asthma     IgE-MEDIATED ALLERGIC ASTHMA   • Backache     CHRONIC   • CHF (congestive heart failure) (CMS/HCC)    • Chronic hepatitis C (CMS/HCC)     2b. Fibrosure .05/F0, necroinflam .14/A0. Repeat .05/F0, .13/A0      • Chronic neck pain    • Constipation    • COPD (chronic obstructive pulmonary disease) (CMS/HCC)    • Diabetes (CMS/HCC)    • Diarrhea    •  Elevated levels of transaminase & lactic acid dehydrogenase    • Emphysema, unspecified (CMS/HCC)    • Generalized abdominal pain    • GERD (gastroesophageal reflux disease)     WITH ESOPHAGITIS   • Hepatitis    • High risk sexual behavior    • Hypertension    • Hypokalemia    • Irritable bowel syndrome (IBS)    • Multiple joint pain    • Need for vaccination    • On long term drug therapy    • Shoulder pain    • Tobacco dependence syndrome      Past Surgical History:   Procedure Laterality Date   • APPENDECTOMY     • APPENDECTOMY     • BACK SURGERY     • CHOLECYSTECTOMY     • CHOLECYSTECTOMY     • COLONOSCOPY  02/08/2016   • COLONOSCOPY W/ POLYPECTOMY  02/08/2016    External and internal hemorrhoids.The examination was otherwise normal.Fluid aspiration performed.Several biopsies obtained in the entire colon.   • ENDOSCOPY  02/08/2016    Mildly severe esophagitis.Gastritis.Normal examined duodenum.Several biopsies obtained in the lower third of the esophagus.Several biopsies obtained in the gastric antrum.Several biopsies obtained in the first part of the duodenum.   • ENDOSCOPY AND COLONOSCOPY  08/13/2012    Internal & external hemorrhoids found. Scope could not pass into the TI.   • ENDOSCOPY W/ PEG TUBE PLACEMENT  08/13/2012    Esophagitis seen. Biopsy taken. Gastritis in stomach. Biopsy taken. Normal duodenum. Biopsy taken.   • HEMORRHOIDECTOMY     • HERNIA REPAIR     • INJECTION OF MEDICATION  08/01/2013    METHYLPREDNISONE, X2   • INJECTION OF MEDICATION  11/03/2011    KENALOG   • INJECTION OF MEDICATION  02/24/2011    ROCEPHIN   • KNEE SURGERY     • TONSILLECTOMY AND ADENOIDECTOMY     • TOTAL ABDOMINAL HYSTERECTOMY WITH SALPINGO OOPHORECTOMY     • UPPER GASTROINTESTINAL ENDOSCOPY  02/08/2016     Family History   Problem Relation Age of Onset   • Coronary artery disease Mother    • Diabetes Mother    • Heart failure Mother    • Cancer Father    • Diabetes Father    • Heart failure Father    • Thyroid disease  Father    • Breast cancer Sister    • Cancer Other         COLORECTAL   • Endometrial cancer Other    • Ovarian cancer Other    • Diabetes Brother      OB History     No data available        Allergies   Allergen Reactions   • Doxycycline Itching   • Penicillins Rash     Social History     Socioeconomic History   • Marital status:      Spouse name: Not on file   • Number of children: Not on file   • Years of education: Not on file   • Highest education level: Not on file   Tobacco Use   • Smoking status: Current Every Day Smoker     Packs/day: 0.25     Types: Cigarettes   • Smokeless tobacco: Never Used   Substance and Sexual Activity   • Alcohol use: No   • Drug use: No   • Sexual activity: Defer       Current Outpatient Medications:   •  albuterol (PROVENTIL HFA;VENTOLIN HFA) 108 (90 BASE) MCG/ACT inhaler, Inhale 2 puffs Every 4 (Four) Hours As Needed for wheezing., Disp: , Rfl:   •  albuterol (PROVENTIL) (5 MG/ML) 0.5% nebulizer solution, Take 2.5 mg by nebulization Every 6 (Six) Hours As Needed for wheezing., Disp: , Rfl:   •  amitriptyline (ELAVIL) 100 MG tablet, Take 200 mg by mouth Every Night., Disp: , Rfl:   •  amLODIPine (NORVASC) 5 MG tablet, Take 5 mg by mouth Daily., Disp: , Rfl:   •  benzonatate (TESSALON) 100 MG capsule, Take 100 mg by mouth 3 (Three) Times a Day As Needed for Cough., Disp: , Rfl:   •  cetirizine (zyrTEC) 10 MG tablet, Take 10 mg by mouth Daily., Disp: , Rfl:   •  diphenoxylate-atropine (LOMOTIL) 2.5-0.025 MG per tablet, Take 1 tablet by mouth 4 (Four) Times a Day As Needed for Diarrhea., Disp: , Rfl:   •  famotidine (PEPCID) 40 MG tablet, Take 40 mg by mouth Daily., Disp: , Rfl:   •  fluconazole (DIFLUCAN) 100 MG tablet, Take 100 mg by mouth 1 (One) Time Per Week., Disp: , Rfl:   •  fluticasone (FLONASE) 50 MCG/ACT nasal spray, 1 spray into each nostril Daily., Disp: , Rfl:   •  furosemide (LASIX) 20 MG tablet, Take 20 mg by mouth Daily., Disp: , Rfl:   •  gemfibrozil (LOPID)  "600 MG tablet, Take 600 mg by mouth 2 (Two) Times a Day Before Meals., Disp: , Rfl:   •  glyBURIDE (DIAbeta) 2.5 MG tablet, Take 2.5 mg by mouth Daily With Breakfast., Disp: , Rfl:   •  HYDROcodone-acetaminophen (NORCO) 7.5-325 MG per tablet, Take 1 tablet by mouth 3 (Three) Times a Day., Disp: , Rfl:   •  JANUMET XR  MG tablet sustained-release 24 hour, Take 1 tablet by mouth 2 (Two) Times a Day., Disp: , Rfl:   •  mometasone-formoterol (DULERA 100) 100-5 MCG/ACT inhaler, Inhale 2 puffs 2 (Two) Times a Day., Disp: 1 inhaler, Rfl: 11  •  montelukast (SINGULAIR) 10 MG tablet, Take 1 tablet by mouth Every Night., Disp: 30 tablet, Rfl: 11  •  omeprazole (priLOSEC) 40 MG capsule, Take 40 mg by mouth Daily., Disp: , Rfl:   •  potassium chloride (K-DUR,KLOR-CON) 10 MEQ CR tablet, Take 10 mEq by mouth 2 (Two) Times a Day., Disp: , Rfl:   •  pravastatin (PRAVACHOL) 20 MG tablet, Take 20 mg by mouth Every Night., Disp: , Rfl:   •  sennosides-docusate sodium (SENOKOT-S) 8.6-50 MG tablet, Take 2 tablets by mouth Every Night., Disp: , Rfl:   Review of Systems  Review of Systems       Objective    /90 (BP Location: Left arm)   Pulse 107   Ht 160 cm (63\")   Wt 73.9 kg (163 lb)   BMI 28.87 kg/m²   Physical Exam   Constitutional: She is oriented to person, place, and time. She appears well-developed and well-nourished. No distress.   HENT:   Head: Normocephalic and atraumatic.   Eyes: EOM are normal. Pupils are equal, round, and reactive to light.   Neck: Normal range of motion.   Cardiovascular: Normal rate, regular rhythm and normal heart sounds.   Pulmonary/Chest: Effort normal and breath sounds normal.   Abdominal: Soft. Bowel sounds are normal. She exhibits no shifting dullness, no distension, no abdominal bruit, no ascites and no mass. There is no hepatosplenomegaly. There is tenderness. There is no rigidity, no rebound, no guarding and no CVA tenderness. No hernia. Hernia confirmed negative in the ventral " area.   Mild diffuse   Musculoskeletal: Normal range of motion.   Neurological: She is alert and oriented to person, place, and time.   Skin: Skin is warm and dry.   Psychiatric: She has a normal mood and affect. Her behavior is normal. Judgment and thought content normal.   Nursing note and vitals reviewed.    Assessment/Plan      1. Chronic hepatitis C without hepatic coma (CMS/HCC)    2. TORRES (nonalcoholic steatohepatitis)    3. Gastroesophageal reflux disease with esophagitis    4. Elevated liver enzymes    .   Marguerite was seen today for heartburn, torres and hepatitis c.    Diagnoses and all orders for this visit:    Chronic hepatitis C without hepatic coma (CMS/HCC)  -     CBC & Differential  -     Comprehensive Metabolic Panel  -     Protime-INR  -     HCV FibroSURE  -     Hepatitis C RNA, Quantitative, PCR (graph)  -     CBC Auto Differential    TORRES (nonalcoholic steatohepatitis)  -     CBC & Differential  -     Comprehensive Metabolic Panel  -     Protime-INR  -     HCV FibroSURE  -     Hepatitis C RNA, Quantitative, PCR (graph)  -     CBC Auto Differential    Gastroesophageal reflux disease with esophagitis    Elevated liver enzymes  -     CBC & Differential  -     Comprehensive Metabolic Panel  -     Protime-INR  -     HCV FibroSURE  -     Hepatitis C RNA, Quantitative, PCR (graph)  -     CBC Auto Differential        Orders placed during this encounter include:  Orders Placed This Encounter   Procedures   • Comprehensive Metabolic Panel   • Protime-INR   • HCV FibroSURE   • Hepatitis C RNA, Quantitative, PCR (graph)   • CBC Auto Differential   • CBC & Differential     Order Specific Question:   Manual Differential     Answer:   No       Medications prescribed:  No orders of the defined types were placed in this encounter.      Requested Prescriptions      No prescriptions requested or ordered in this encounter       Review and/or summary of lab tests, radiology, procedures, medications. Review and summary of  old records and obtaining of history. The risks and benefits of my recommendations, as well as other treatment options were discussed with the patient today. Questions were answered.    Follow-up: Return if symptoms worsen or fail to improve, for After the above.     * Surgery not found *      This document has been electronically signed by Genaro Franco PA-C on February 4, 2019 5:08 PM      Results for orders placed or performed in visit on 02/04/19   CBC Auto Differential   Result Value Ref Range    WBC 7.88 3.20 - 9.80 10*3/mm3    RBC 4.80 3.77 - 5.16 10*6/mm3    Hemoglobin 12.7 12.0 - 15.5 g/dL    Hematocrit 37.7 35.0 - 45.0 %    MCV 78.5 (L) 80.0 - 98.0 fL    MCH 26.5 26.5 - 34.0 pg    MCHC 33.7 31.4 - 36.0 g/dL    RDW 14.3 11.5 - 14.5 %    RDW-SD 40.6 36.4 - 46.3 fl    MPV 10.2 8.0 - 12.0 fL    Platelets 273 150 - 450 10*3/mm3    Neutrophil % 61.9 37.0 - 80.0 %    Lymphocyte % 25.4 10.0 - 50.0 %    Monocyte % 7.9 0.0 - 12.0 %    Eosinophil % 3.8 0.0 - 7.0 %    Basophil % 0.4 0.0 - 2.0 %    Immature Grans % 0.6 (H) 0.0 - 0.5 %    Neutrophils, Absolute 4.88 2.00 - 8.60 10*3/mm3    Lymphocytes, Absolute 2.00 0.60 - 4.20 10*3/mm3    Monocytes, Absolute 0.62 0.00 - 0.90 10*3/mm3    Eosinophils, Absolute 0.30 0.00 - 0.70 10*3/mm3    Basophils, Absolute 0.03 0.00 - 0.20 10*3/mm3    Immature Grans, Absolute 0.05 (H) 0.00 - 0.02 10*3/mm3   Protime-INR   Result Value Ref Range    Protime 13.0 11.1 - 15.3 Seconds    INR 1.00 0.80 - 1.20   Comprehensive Metabolic Panel   Result Value Ref Range    Glucose 132 (H) 60 - 100 mg/dL    BUN 12 7 - 21 mg/dL    Creatinine 0.52 0.50 - 1.00 mg/dL    Sodium 138 137 - 145 mmol/L    Potassium 4.0 3.5 - 5.1 mmol/L    Chloride 100 95 - 110 mmol/L    CO2 24.0 22.0 - 31.0 mmol/L    Calcium 8.9 8.4 - 10.2 mg/dL    Total Protein 7.7 6.3 - 8.6 g/dL    Albumin 4.50 3.40 - 4.80 g/dL    ALT (SGPT) 38 9 - 52 U/L    AST (SGOT) 33 14 - 36 U/L    Alkaline Phosphatase 114 38 - 126 U/L    Total  Bilirubin 0.1 (L) 0.2 - 1.3 mg/dL    eGFR Non  Amer 121 (H) 51 - 120 mL/min/1.73    Globulin 3.2 2.3 - 3.5 gm/dL    A/G Ratio 1.4 1.1 - 1.8 g/dL    BUN/Creatinine Ratio 23.1 7.0 - 25.0    Anion Gap 14.0 5.0 - 15.0 mmol/L   Results for orders placed or performed in visit on 07/10/18   HCV RT-PCR,Quant(Non-Graph)   Result Value Ref Range    Hepatitis C Quantitation HCV Not Detected IU/mL    Test Information Comment    CBC Auto Differential   Result Value Ref Range    WBC 7.10 3.20 - 9.80 10*3/mm3    RBC 4.99 3.77 - 5.16 10*6/mm3    Hemoglobin 13.1 12.0 - 15.5 g/dL    Hematocrit 39.1 35.0 - 45.0 %    MCV 78.4 (L) 80.0 - 98.0 fL    MCH 26.3 (L) 26.5 - 34.0 pg    MCHC 33.5 31.4 - 36.0 g/dL    RDW 15.5 (H) 11.5 - 14.5 %    RDW-SD 44.1 36.4 - 46.3 fl    MPV 10.7 8.0 - 12.0 fL    Platelets 226 150 - 450 10*3/mm3    Neutrophil % 55.1 37.0 - 80.0 %    Lymphocyte % 28.7 10.0 - 50.0 %    Monocyte % 8.3 0.0 - 12.0 %    Eosinophil % 5.5 0.0 - 7.0 %    Basophil % 0.7 0.0 - 2.0 %    Immature Grans % 1.7 (H) 0.0 - 0.5 %    Neutrophils, Absolute 3.91 2.00 - 8.60 10*3/mm3    Lymphocytes, Absolute 2.04 0.60 - 4.20 10*3/mm3    Monocytes, Absolute 0.59 0.00 - 0.90 10*3/mm3    Eosinophils, Absolute 0.39 0.00 - 0.70 10*3/mm3    Basophils, Absolute 0.05 0.00 - 0.20 10*3/mm3    Immature Grans, Absolute 0.12 (H) 0.00 - 0.02 10*3/mm3   AFP Tumor Marker   Result Value Ref Range    AFP Tumor Marker 2.6 0.0 - 8.3 ng/mL   Protime-INR   Result Value Ref Range    Protime 13.3 11.1 - 15.3 Seconds    INR 1.03 0.80 - 1.20   Comprehensive Metabolic Panel   Result Value Ref Range    Glucose 178 (H) 60 - 100 mg/dL    BUN 13 7 - 21 mg/dL    Creatinine 0.40 (L) 0.50 - 1.00 mg/dL    Sodium 141 137 - 145 mmol/L    Potassium 4.3 3.5 - 5.1 mmol/L    Chloride 102 95 - 110 mmol/L    CO2 27.0 22.0 - 31.0 mmol/L    Calcium 9.8 8.4 - 10.2 mg/dL    Total Protein 7.8 6.3 - 8.6 g/dL    Albumin 4.70 3.40 - 4.80 g/dL    ALT (SGPT) 24 9 - 52 U/L    AST (SGOT) 22 14  - 36 U/L    Alkaline Phosphatase 132 (H) 38 - 126 U/L    Total Bilirubin 0.3 0.2 - 1.3 mg/dL    eGFR Non  Amer 163 (H) 51 - 120 mL/min/1.73    Globulin 3.1 2.3 - 3.5 gm/dL    A/G Ratio 1.5 1.1 - 1.8 g/dL    BUN/Creatinine Ratio 32.5 (H) 7.0 - 25.0    Anion Gap 12.0 5.0 - 15.0 mmol/L   Results for orders placed or performed in visit on 04/20/18   Lipoprotein Phenotyping   Result Value Ref Range    Total Cholesterol 186 100 - 199 mg/dL    Triglycerides 448 (H) 0 - 149 mg/dL    HDL Cholesterol 36 (L) >39 mg/dL    VLDL Cholesterol Comment 5 - 40 mg/dL    LDL Cholesterol  Comment 0 - 99 mg/dL    Lipoprotein Phenotype Comment    MicroAlbumin, Urine, Random - Urine, Clean Catch   Result Value Ref Range    Microalbumin, Urine <0.6 mg/L   TSH   Result Value Ref Range    TSH 2.520 0.460 - 4.680 mIU/mL   Hemoglobin A1c   Result Value Ref Range    Hemoglobin A1C 7.9 (H) 4 - 5.6 %     *Note: Due to a large number of results and/or encounters for the requested time period, some results have not been displayed. A complete set of results can be found in Results Review.       Some portions of this note have been dictated using voice recognition software and may contain errors and/or omissions.

## 2019-02-04 NOTE — PATIENT INSTRUCTIONS

## 2019-02-06 LAB
A2 MACROGLOB SERPL-MCNC: 118 MG/DL (ref 110–276)
ALT SERPL W P-5'-P-CCNC: 34 IU/L (ref 0–40)
APO A-I SERPL-MCNC: 102 MG/DL (ref 116–209)
BILIRUB SERPL-MCNC: 0.1 MG/DL (ref 0–1.2)
FIBROSIS SCORING:: ABNORMAL
FIBROSIS STAGE SERPL QL: ABNORMAL
GGT SERPL-CCNC: 20 IU/L (ref 0–60)
HAPTOGLOB SERPL-MCNC: 222 MG/DL (ref 34–200)
HCV AB SER QL: ABNORMAL
LABORATORY COMMENT REPORT: ABNORMAL
LIMITATIONS:: ABNORMAL
LIVER FIBR SCORE SERPL CALC.FIBROSURE: 0.03 (ref 0–0.21)
NECROINFLAMM ACTIVITY SCORING:: ABNORMAL
NECROINFLAMMATORY ACT GRADE SERPL QL: ABNORMAL
NECROINFLAMMATORY ACT SCORE SERPL: 0.12 (ref 0–0.17)

## 2019-02-12 LAB
HCV RNA SERPL NAA+PROBE-ACNC: NORMAL IU/ML
TEST INFORMATION: NORMAL

## 2019-02-26 PROBLEM — E66.3 OVERWEIGHT (BMI 25.0-29.9): Status: ACTIVE | Noted: 2019-02-26

## 2019-02-26 NOTE — PROGRESS NOTES
Pulmonary Office Follow-up    Subjective     Marguerite Rob is seen today at the office for   Chief Complaint   Patient presents with   • COPD         HPI  Marguerite Rob is a 58 y.o. female with a PMH significant for COPD/ asthma, tobacco use, and CHF who presents for follow-up of COPD. She was last seen on 11/13/18, at which time I recommended continuing Dulera twice daily and again stressed the importance of complete tobacco cessation. Pt states her breathing was doing better when she was able to cut back to a few cigarettes a day. She then found out her daughter is schizophrenic and the stress caused her to start smoking more again. Pt has been trying to help he daughter but her daughter was refractory to her assistance. She complains of a headache and severe heartburn this morning. Pt reports she has been exposed to strep throat so she was treated with empiric abx. She states she continues on Dulera BID and is not needing to use her albuterol. Pt does admit that her breathing was much better when she had cut back on her smoking and she understands the need to quit completely.  She denies any recent exacerbations or steroid use.    Patient Active Problem List   Diagnosis   • Cigarette nicotine dependence, uncomplicated   • Chronic allergic rhinitis   • COPD with asthma (CMS/HCC)   • Chest pain   • Dyspnea   • Benign essential HTN   • Mixed dyslipidemia   • TORRES (nonalcoholic steatohepatitis)   • GERD with esophagitis   • DM (diabetes mellitus) (CMS/HCC)   • Mold exposure   • Smoker   • Overweight (BMI 25.0-29.9)       Review of Systems  Review of Systems   Constitutional: Negative for fatigue, fever and unexpected weight change.   HENT: Negative for congestion, nosebleeds and postnasal drip.    Respiratory: Positive for cough and shortness of breath. Negative for wheezing.    Cardiovascular: Negative for chest pain and leg swelling.   Gastrointestinal:        Heartburn   Musculoskeletal: Positive for back  pain.   Neurological: Positive for headaches.     As described in the HPI. Otherwise, remainder of ROS (14 systems) were negative.    Medications, Allergies, Social, and Family Histories reviewed as per EMR.    Objective     Vitals:    02/27/19 0912   BP: 129/75   Pulse: 111     Physical Exam   Constitutional: She is oriented to person, place, and time. Vital signs are normal. She appears well-developed and well-nourished.   HENT:   Head: Normocephalic and atraumatic.   Nose: No mucosal edema or rhinorrhea.   Mouth/Throat: Uvula is midline, oropharynx is clear and moist and mucous membranes are normal.   Mallampati 2   Eyes: Conjunctivae, EOM and lids are normal. Pupils are equal, round, and reactive to light.   Neck: Trachea normal and normal range of motion. No tracheal tenderness present. No thyroid mass present.   Cardiovascular: Normal rate, regular rhythm and normal heart sounds. Exam reveals no gallop.   No murmur heard.  Pulmonary/Chest: Effort normal. No respiratory distress. She has no wheezes. She has no rhonchi. She exhibits no tenderness.   Abdominal: Soft. Normal appearance and bowel sounds are normal. She exhibits no distension. There is no hepatomegaly. There is no tenderness.   Musculoskeletal:   Normal gait, no extremity edema     Vascular Status -  Her right foot exhibits no edema. Her left foot exhibits no edema.  Lymphadenopathy:        Head (right side): No submandibular adenopathy present.        Head (left side): No submandibular adenopathy present.     She has no cervical adenopathy.        Right: No supraclavicular adenopathy present.        Left: No supraclavicular adenopathy present.   Neurological: She is alert and oriented to person, place, and time.   Skin: Skin is warm and dry.   Psychiatric: She has a normal mood and affect. Her speech is normal and behavior is normal. Judgment normal.   Nursing note and vitals reviewed.    IMAGING: LDCT 8/27/18 (independently reviewed and  interpreted by me) showed emphysematous changes, no nodules      Assessment/Plan     Marguerite was seen today for copd.    Diagnoses and all orders for this visit:    COPD with asthma (CMS/[facility])    Chronic allergic rhinitis    Cigarette nicotine dependence, uncomplicated    Overweight (BMI 25.0-29.9)    GERD with esophagitis         Discussion/ Recommendations:   She is doing well on her Dulera with infrequent albuterol use.  Unfortunately, she did increase her smoking again related to some family stressors, but she understands the need to quit completely.  Otherwise, her chronic rhinitis is well controlled.  She did have an episode of heartburn this morning followed by emesis, but otherwise her heartburn has been relatively well controlled.    -Continue Dulera twice daily.  -Use albuterol as needed for dyspnea or wheeze.    -Encouraged frequent nasal saline use.  -Continue Flonase, singulair and zyrtec.  -I advised Marguerite of the risks of continuing to use tobacco, and I provided her with tobacco cessation educational materials in the After Visit Summary.  Encouraged her to consider picking a quit date.  Offered support.  During this visit, I spent 3 minutes counseling the patient regarding tobacco cessation.  -Annual LDCT August 2019.  -If heartburn occurs 3 or more times a week, I advised her to see her PCP to discuss modification of her regimen.  -Annual flu vaccine.    Patient's Body mass index is 28.7 kg/m². BMI is above normal parameters. Recommendations include: exercise counseling.      Return in about 3 months (around 5/27/2019) for Recheck COPD.          This document has been electronically signed by Denisse Marcano MD on February 27, 2019 10:03 AM      Dragon dictation used

## 2019-02-27 ENCOUNTER — OFFICE VISIT (OUTPATIENT)
Dept: PULMONOLOGY | Facility: CLINIC | Age: 59
End: 2019-02-27

## 2019-02-27 VITALS
DIASTOLIC BLOOD PRESSURE: 75 MMHG | SYSTOLIC BLOOD PRESSURE: 129 MMHG | WEIGHT: 162 LBS | BODY MASS INDEX: 28.7 KG/M2 | HEIGHT: 63 IN | HEART RATE: 111 BPM

## 2019-02-27 DIAGNOSIS — J44.9 COPD WITH ASTHMA (HCC): Primary | Chronic | ICD-10-CM

## 2019-02-27 DIAGNOSIS — J30.9 CHRONIC ALLERGIC RHINITIS: ICD-10-CM

## 2019-02-27 DIAGNOSIS — K21.00 GERD WITH ESOPHAGITIS: ICD-10-CM

## 2019-02-27 DIAGNOSIS — E66.3 OVERWEIGHT (BMI 25.0-29.9): ICD-10-CM

## 2019-02-27 DIAGNOSIS — F17.210 CIGARETTE NICOTINE DEPENDENCE, UNCOMPLICATED: ICD-10-CM

## 2019-02-27 PROCEDURE — 99214 OFFICE O/P EST MOD 30 MIN: CPT | Performed by: INTERNAL MEDICINE

## 2019-02-27 PROCEDURE — 99406 BEHAV CHNG SMOKING 3-10 MIN: CPT | Performed by: INTERNAL MEDICINE

## 2019-03-27 ENCOUNTER — TELEPHONE (OUTPATIENT)
Dept: PULMONOLOGY | Facility: CLINIC | Age: 59
End: 2019-03-27

## 2019-03-27 DIAGNOSIS — J44.9 COPD WITH ASTHMA (HCC): Primary | ICD-10-CM

## 2019-03-27 NOTE — TELEPHONE ENCOUNTER
Received notice that Ms. Rob's Dulera rx has been denied.  Patient notified that her ddulera was denied and a new rx for Breo has been sent to her pharmacy.  She understood.

## 2019-04-01 NOTE — PATIENT INSTRUCTIONS



Mild intermittent



REVIEWER QUERY TEXT:



Asthma Specificity and Type







-- Mild intermittent

-- Mild persistent

-- Moderate persistent

-- Severe persistent

-- Exercise induced bronchospasm

-- Cough variant asthma

-- Other, please specify



The patient's Clinical Indicators include:

PMH: Asthma



Medication: Advair Diskus





Query created by: Shae Shrestha on 4/1/2019 8:31 AM















Electronically signed by:  Ramon DE LA ROSA 4/1/2019 1:29 PM

## 2019-04-30 ENCOUNTER — HOSPITAL ENCOUNTER (OUTPATIENT)
Dept: GENERAL RADIOLOGY | Facility: HOSPITAL | Age: 59
Discharge: HOME | End: 2019-04-30
Admitting: ORTHOPAEDIC SURGERY

## 2019-04-30 ENCOUNTER — APPOINTMENT (OUTPATIENT)
Dept: PREADMISSION TESTING | Facility: HOSPITAL | Age: 59
End: 2019-04-30

## 2019-04-30 VITALS
WEIGHT: 153.66 LBS | HEIGHT: 63 IN | HEART RATE: 105 BPM | BODY MASS INDEX: 27.23 KG/M2 | OXYGEN SATURATION: 96 % | SYSTOLIC BLOOD PRESSURE: 121 MMHG | RESPIRATION RATE: 18 BRPM | DIASTOLIC BLOOD PRESSURE: 70 MMHG

## 2019-04-30 LAB
ALBUMIN SERPL-MCNC: 4.5 G/DL (ref 3.5–5)
ALBUMIN/GLOB SERPL: 1.4 G/DL (ref 1.1–2.5)
ALP SERPL-CCNC: 127 U/L (ref 24–120)
ALT SERPL W P-5'-P-CCNC: <15 U/L (ref 0–54)
ANION GAP SERPL CALCULATED.3IONS-SCNC: 15 MMOL/L (ref 4–13)
APTT PPP: 32.9 SECONDS (ref 24.1–35)
AST SERPL-CCNC: 20 U/L (ref 7–45)
BACTERIA UR QL AUTO: ABNORMAL /HPF
BASOPHILS # BLD AUTO: 0.05 10*3/MM3 (ref 0–0.2)
BASOPHILS NFR BLD AUTO: 0.6 % (ref 0–2)
BILIRUB SERPL-MCNC: 0.3 MG/DL (ref 0.1–1)
BILIRUB UR QL STRIP: NEGATIVE
BUN BLD-MCNC: 16 MG/DL (ref 5–21)
BUN/CREAT SERPL: 27.1 (ref 7–25)
CALCIUM SPEC-SCNC: 10.3 MG/DL (ref 8.4–10.4)
CHLORIDE SERPL-SCNC: 102 MMOL/L (ref 98–110)
CLARITY UR: CLEAR
CO2 SERPL-SCNC: 22 MMOL/L (ref 24–31)
COLOR UR: YELLOW
CREAT BLD-MCNC: 0.59 MG/DL (ref 0.5–1.4)
DEPRECATED RDW RBC AUTO: 42.6 FL (ref 40–54)
EOSINOPHIL # BLD AUTO: 0.21 10*3/MM3 (ref 0–0.7)
EOSINOPHIL NFR BLD AUTO: 2.7 % (ref 0–4)
ERYTHROCYTE [DISTWIDTH] IN BLOOD BY AUTOMATED COUNT: 14.9 % (ref 12–15)
GFR SERPL CREATININE-BSD FRML MDRD: 104 ML/MIN/1.73
GLOBULIN UR ELPH-MCNC: 3.3 GM/DL
GLUCOSE BLD-MCNC: 186 MG/DL (ref 70–100)
GLUCOSE UR STRIP-MCNC: NEGATIVE MG/DL
HCT VFR BLD AUTO: 34.5 % (ref 37–47)
HGB BLD-MCNC: 11.3 G/DL (ref 12–16)
HGB UR QL STRIP.AUTO: NEGATIVE
HYALINE CASTS UR QL AUTO: ABNORMAL /LPF
IMM GRANULOCYTES # BLD AUTO: 0.04 10*3/MM3 (ref 0–0.05)
IMM GRANULOCYTES NFR BLD AUTO: 0.5 % (ref 0–5)
INR PPP: 1 (ref 0.91–1.09)
KETONES UR QL STRIP: NEGATIVE
LEUKOCYTE ESTERASE UR QL STRIP.AUTO: ABNORMAL
LYMPHOCYTES # BLD AUTO: 2.1 10*3/MM3 (ref 0.72–4.86)
LYMPHOCYTES NFR BLD AUTO: 27.2 % (ref 15–45)
MCH RBC QN AUTO: 25.7 PG (ref 28–32)
MCHC RBC AUTO-ENTMCNC: 32.8 G/DL (ref 33–36)
MCV RBC AUTO: 78.6 FL (ref 82–98)
MONOCYTES # BLD AUTO: 0.51 10*3/MM3 (ref 0.19–1.3)
MONOCYTES NFR BLD AUTO: 6.6 % (ref 4–12)
NEUTROPHILS # BLD AUTO: 4.81 10*3/MM3 (ref 1.87–8.4)
NEUTROPHILS NFR BLD AUTO: 62.4 % (ref 39–78)
NITRITE UR QL STRIP: POSITIVE
NRBC BLD AUTO-RTO: 0 /100 WBC (ref 0–0.2)
PH UR STRIP.AUTO: <=5 [PH] (ref 5–8)
PLATELET # BLD AUTO: 335 10*3/MM3 (ref 130–400)
PMV BLD AUTO: 11.2 FL (ref 6–12)
POTASSIUM BLD-SCNC: 4.1 MMOL/L (ref 3.5–5.3)
PROT SERPL-MCNC: 7.8 G/DL (ref 6.3–8.7)
PROT UR QL STRIP: NEGATIVE
PROTHROMBIN TIME: 13.5 SECONDS (ref 11.9–14.6)
RBC # BLD AUTO: 4.39 10*6/MM3 (ref 4.2–5.4)
RBC # UR: ABNORMAL /HPF
REF LAB TEST METHOD: ABNORMAL
SODIUM BLD-SCNC: 139 MMOL/L (ref 135–145)
SP GR UR STRIP: 1.01 (ref 1–1.03)
SQUAMOUS #/AREA URNS HPF: ABNORMAL /HPF
UROBILINOGEN UR QL STRIP: ABNORMAL
WBC NRBC COR # BLD: 7.72 10*3/MM3 (ref 4.8–10.8)
WBC UR QL AUTO: ABNORMAL /HPF

## 2019-04-30 PROCEDURE — 85610 PROTHROMBIN TIME: CPT | Performed by: ORTHOPAEDIC SURGERY

## 2019-04-30 PROCEDURE — 85730 THROMBOPLASTIN TIME PARTIAL: CPT | Performed by: ORTHOPAEDIC SURGERY

## 2019-04-30 PROCEDURE — 93010 ELECTROCARDIOGRAM REPORT: CPT | Performed by: INTERNAL MEDICINE

## 2019-04-30 PROCEDURE — 87186 SC STD MICRODIL/AGAR DIL: CPT | Performed by: ORTHOPAEDIC SURGERY

## 2019-04-30 PROCEDURE — 87088 URINE BACTERIA CULTURE: CPT | Performed by: ORTHOPAEDIC SURGERY

## 2019-04-30 PROCEDURE — 93005 ELECTROCARDIOGRAM TRACING: CPT

## 2019-04-30 PROCEDURE — 81001 URINALYSIS AUTO W/SCOPE: CPT | Performed by: ORTHOPAEDIC SURGERY

## 2019-04-30 PROCEDURE — 87086 URINE CULTURE/COLONY COUNT: CPT | Performed by: ORTHOPAEDIC SURGERY

## 2019-04-30 PROCEDURE — 80053 COMPREHEN METABOLIC PANEL: CPT | Performed by: ORTHOPAEDIC SURGERY

## 2019-04-30 PROCEDURE — 85025 COMPLETE CBC W/AUTO DIFF WBC: CPT | Performed by: ORTHOPAEDIC SURGERY

## 2019-04-30 PROCEDURE — 36415 COLL VENOUS BLD VENIPUNCTURE: CPT

## 2019-04-30 PROCEDURE — 71046 X-RAY EXAM CHEST 2 VIEWS: CPT

## 2019-04-30 RX ORDER — PANTOPRAZOLE SODIUM 40 MG/1
40 TABLET, DELAYED RELEASE ORAL DAILY
COMMUNITY
End: 2019-09-11

## 2019-04-30 RX ORDER — METOCLOPRAMIDE 10 MG/1
10 TABLET ORAL AS NEEDED
COMMUNITY
End: 2020-11-12

## 2019-05-02 LAB — BACTERIA SPEC AEROBE CULT: ABNORMAL

## 2019-05-06 ENCOUNTER — OFFICE VISIT (OUTPATIENT)
Dept: GASTROENTEROLOGY | Facility: CLINIC | Age: 59
End: 2019-05-06

## 2019-05-06 VITALS
HEIGHT: 63 IN | WEIGHT: 152.4 LBS | DIASTOLIC BLOOD PRESSURE: 84 MMHG | BODY MASS INDEX: 27 KG/M2 | HEART RATE: 112 BPM | SYSTOLIC BLOOD PRESSURE: 142 MMHG

## 2019-05-06 DIAGNOSIS — R15.9 FULL INCONTINENCE OF FECES: ICD-10-CM

## 2019-05-06 DIAGNOSIS — R11.0 NAUSEA: ICD-10-CM

## 2019-05-06 DIAGNOSIS — R10.10 PAIN OF UPPER ABDOMEN: ICD-10-CM

## 2019-05-06 DIAGNOSIS — K21.00 GASTROESOPHAGEAL REFLUX DISEASE WITH ESOPHAGITIS: ICD-10-CM

## 2019-05-06 DIAGNOSIS — R74.8 ALKALINE PHOSPHATASE ELEVATION: Primary | ICD-10-CM

## 2019-05-06 DIAGNOSIS — D50.0 IRON DEFICIENCY ANEMIA DUE TO CHRONIC BLOOD LOSS: ICD-10-CM

## 2019-05-06 DIAGNOSIS — K74.00 HEPATIC FIBROSIS: ICD-10-CM

## 2019-05-06 PROCEDURE — 99214 OFFICE O/P EST MOD 30 MIN: CPT | Performed by: PHYSICIAN ASSISTANT

## 2019-05-06 RX ORDER — DEXTROSE AND SODIUM CHLORIDE 5; .45 G/100ML; G/100ML
30 INJECTION, SOLUTION INTRAVENOUS CONTINUOUS PRN
Status: CANCELLED | OUTPATIENT
Start: 2019-08-16

## 2019-05-06 RX ORDER — FAMOTIDINE 20 MG/1
20 TABLET, FILM COATED ORAL DAILY
COMMUNITY
End: 2019-09-11

## 2019-05-06 NOTE — PROGRESS NOTES
Chief Complaint   Patient presents with   • Hepatitis C   • Torres   • Heartburn   • Elevated Hepatic Enzymes       ENDO PROCEDURE ORDERED: EGD/COLON nausea, gerd, abd pain, COLON anemia, fecal incontinence    Subjective    Marguerite Luz Maria Rob is a 59 y.o. female. she is here today for follow-up.    History of Present Illness    The patient is seen on a recheck of her history of chronic active hepatitis C, TORRES, GERD, elevated liver enzymes, abdominal pain. Last seen 02/04/2019. Genotype 2B/F0. She had completed 8 weeks of MAVYRET on 04/16/2018.    Laboratories on 02/04/2019, HCV RNA by PCR quantitative was not detected. HCV FibroSure 0.03/F0, 010/A0. Normal INR, CBC. CMP showed glucose 132, otherwise normal. She states she is scheduled for surgery on her neck in [place] in the next few weeks. She cancelled her appointment on 02/25/2019 with us. She same-day cancelled on 04/11/2019, she cancelled again on 04/24/2019.    The patient had laboratories on 04/30/2019, chest x-ray showed infiltrate in the right lung with some chronic changes. She had an E. Coli UTI. Normal PT, PTT. CMP showed glucose 186, CO2 of 22, alkaline phosphatase 127, otherwise normal. CBC showed hemoglobin of 11.3, hematocrit of 34.5.    The patient states she has been having a lot more nausea. She was waking up with a lot of regurgitation on the Prilosec, so her family doctor switched her to Protonix. She thinks it is doing a little bit better, but she states she will have a foul taste in her mouth all the time. She denies any sinus or dental issues. She states he has to brush her teeth multiple times a day to try to control the smell. She states she is having some increasing dysphagia, was previously doing better. She has nausea, but denies vomiting. She states she will be incontinent of stool at least 1-2 times per week. It has been going on for about a year. She does not know when it is going to happen. She just soils herself. Her weight is down  10.6 pounds since last visit. Last EGD/colonoscopy 02/08/2016 showed esophagitis, gastritis, hemorrhoids.    ASSESSMENT/PLAN: High-risk noncompliance. Patient with increased GERD, dysphagia, possible bacterial overgrowth, recommend EGD with possible dilatation. We will also schedule her for colonoscopy. Given her alkaline phosphatase elevation, I suggested an ARISTIDES, AMA, SMA, will need to evaluate for possible PVC. She does have some anemia and will check a colonoscopy given her incontinence, anemia and abdominal pain. She states she is going to have surgery within the next 2 weeks and may need to defer her endoscopy until she has recovered from her surgery. We will plan follow up in 6-8 weeks, further pending clinical course and the results of the above.       The following portions of the patient's history were reviewed and updated as appropriate:   Past Medical History:   Diagnosis Date   • Alcoholic fatty liver    • Alkaline phosphatase raised    • Anxiety    • Asthma     IgE-MEDIATED ALLERGIC ASTHMA   • Backache     CHRONIC   • CHF (congestive heart failure) (CMS/HCC)    • Chronic hepatitis C (CMS/HCC)     2b. Fibrosure .05/F0, necroinflam .14/A0. Repeat .05/F0, .13/A0      • Chronic neck pain    • Constipation    • COPD (chronic obstructive pulmonary disease) (CMS/HCC)    • Diabetes (CMS/HCC)    • Diarrhea    • Elevated levels of transaminase & lactic acid dehydrogenase    • Emphysema, unspecified (CMS/HCC)    • Generalized abdominal pain    • GERD (gastroesophageal reflux disease)     WITH ESOPHAGITIS   • Hepatitis    • High risk sexual behavior    • Hypertension    • Hypokalemia    • Irritable bowel syndrome (IBS)    • Multiple joint pain    • Need for vaccination    • Numbness and tingling in left arm    • On long term drug therapy    • Shoulder pain    • Tobacco dependence syndrome      Past Surgical History:   Procedure Laterality Date   • APPENDECTOMY     • CERVICAL FUSION  1999    C5-6   • CHOLECYSTECTOMY      • COLONOSCOPY  02/08/2016   • COLONOSCOPY W/ POLYPECTOMY  02/08/2016    External and internal hemorrhoids.The examination was otherwise normal.Fluid aspiration performed.Several biopsies obtained in the entire colon.   • ENDOSCOPY  02/08/2016    Mildly severe esophagitis.Gastritis.Normal examined duodenum.Several biopsies obtained in the lower third of the esophagus.Several biopsies obtained in the gastric antrum.Several biopsies obtained in the first part of the duodenum.   • ENDOSCOPY  08/13/2012    Esophagitis seen. Biopsy taken. Gastritis in stomach. Biopsy taken. Normal duodenum. Biopsy taken.   • ENDOSCOPY AND COLONOSCOPY  08/13/2012    Internal & external hemorrhoids found. Scope could not pass into the TI.   • FINGER SURGERY Left 04/2019    thumb - piece of metal removed   • HEMORRHOIDECTOMY     • HERNIA REPAIR      pt states no hernia was found so no procedure was done so they closed her back up   • INJECTION OF MEDICATION  08/01/2013    METHYLPREDNISONE, X2   • INJECTION OF MEDICATION  11/03/2011    KENALOG   • INJECTION OF MEDICATION  02/24/2011    ROCEPHIN   • KNEE SURGERY Right    • TONSILLECTOMY AND ADENOIDECTOMY     • TOTAL ABDOMINAL HYSTERECTOMY WITH SALPINGO OOPHORECTOMY     • UPPER GASTROINTESTINAL ENDOSCOPY  02/08/2016     Family History   Problem Relation Age of Onset   • Coronary artery disease Mother    • Diabetes Mother    • Heart failure Mother    • Cancer Father    • Diabetes Father    • Heart failure Father    • Thyroid disease Father    • Breast cancer Sister    • Cancer Other         COLORECTAL   • Endometrial cancer Other    • Ovarian cancer Other    • Diabetes Brother      OB History     No data available        Allergies   Allergen Reactions   • Doxycycline Itching   • Penicillins Rash     Social History     Socioeconomic History   • Marital status:      Spouse name: Not on file   • Number of children: Not on file   • Years of education: Not on file   • Highest education  level: Not on file   Tobacco Use   • Smoking status: Current Every Day Smoker     Packs/day: 0.25     Years: 45.00     Pack years: 11.25     Types: Cigarettes   • Smokeless tobacco: Never Used   Substance and Sexual Activity   • Alcohol use: No   • Drug use: No   • Sexual activity: Defer       Current Outpatient Medications:   •  amitriptyline (ELAVIL) 100 MG tablet, Take 200 mg by mouth Every Night., Disp: , Rfl:   •  amLODIPine (NORVASC) 5 MG tablet, Take 5 mg by mouth Daily., Disp: , Rfl:   •  cetirizine (zyrTEC) 10 MG tablet, Take 10 mg by mouth Daily., Disp: , Rfl:   •  famotidine (PEPCID) 20 MG tablet, Take 20 mg by mouth Daily., Disp: , Rfl:   •  fluconazole (DIFLUCAN) 100 MG tablet, Take 100 mg by mouth 1 (One) Time Per Week., Disp: , Rfl:   •  fluticasone (FLONASE) 50 MCG/ACT nasal spray, 1 spray into each nostril Daily., Disp: , Rfl:   •  furosemide (LASIX) 20 MG tablet, Take 20 mg by mouth Daily., Disp: , Rfl:   •  gemfibrozil (LOPID) 600 MG tablet, Take 600 mg by mouth 2 (Two) Times a Day Before Meals., Disp: , Rfl:   •  glyBURIDE (DIAbeta) 2.5 MG tablet, Take 2.5 mg by mouth 2 (Two) Times a Day With Meals., Disp: , Rfl:   •  HYDROcodone-acetaminophen (NORCO) 7.5-325 MG per tablet, Take 1 tablet by mouth 3 (Three) Times a Day., Disp: , Rfl:   •  JANUMET XR  MG tablet sustained-release 24 hour, Take 1 tablet by mouth 2 (Two) Times a Day., Disp: , Rfl:   •  metoclopramide (REGLAN) 10 MG tablet, Take 10 mg by mouth As Needed (nausea)., Disp: , Rfl:   •  montelukast (SINGULAIR) 10 MG tablet, Take 1 tablet by mouth Every Night., Disp: 30 tablet, Rfl: 11  •  pantoprazole (PROTONIX) 40 MG EC tablet, Take 40 mg by mouth Daily., Disp: , Rfl:   •  potassium chloride (K-DUR,KLOR-CON) 10 MEQ CR tablet, Take 10 mEq by mouth 2 (Two) Times a Day., Disp: , Rfl:   •  pravastatin (PRAVACHOL) 20 MG tablet, Take 20 mg by mouth Every Night., Disp: , Rfl:   •  sennosides-docusate sodium (SENOKOT-S) 8.6-50 MG tablet,  "Take 2 tablets by mouth Every Night., Disp: , Rfl:   •  Fluticasone Furoate-Vilanterol (BREO ELLIPTA) 100-25 MCG/INH inhaler, Inhale 1 puff Daily., Disp: 1 each, Rfl: 11  Review of Systems  Review of Systems   Constitutional: Positive for fatigue and unexpected weight change. Negative for appetite change.   HENT: Positive for trouble swallowing.    Gastrointestinal: Positive for abdominal pain, diarrhea and nausea.          Objective    /84 (BP Location: Left arm)   Pulse 112   Ht 160 cm (63\")   Wt 69.1 kg (152 lb 6.4 oz)   BMI 27.00 kg/m²   Physical Exam   Constitutional: She is oriented to person, place, and time. She appears well-developed and well-nourished. No distress.   HENT:   Head: Normocephalic and atraumatic.   Eyes: EOM are normal. Pupils are equal, round, and reactive to light.   Neck: Normal range of motion.   Cardiovascular: Normal rate, regular rhythm and normal heart sounds.   Pulmonary/Chest: Effort normal and breath sounds normal.   Abdominal: Soft. Bowel sounds are normal. She exhibits no shifting dullness, no distension, no abdominal bruit, no ascites and no mass. There is no hepatosplenomegaly. There is tenderness. There is no rigidity, no rebound, no guarding and no CVA tenderness. No hernia. Hernia confirmed negative in the ventral area.   Mild diffuse   Musculoskeletal: Normal range of motion.   Neurological: She is alert and oriented to person, place, and time.   Skin: Skin is warm and dry.   Psychiatric: She has a normal mood and affect. Her behavior is normal. Judgment and thought content normal.   Nursing note and vitals reviewed.    Assessment/Plan      1. Alkaline phosphatase elevation    2. Gastroesophageal reflux disease with esophagitis    3. Pain of upper abdomen    4. Full incontinence of feces    5. Nausea    6. Iron deficiency anemia due to chronic blood loss    7. Hepatic fibrosis    .   Marguerite was seen today for hepatitis c, richards, heartburn and elevated hepatic " enzymes.    Diagnoses and all orders for this visit:    Alkaline phosphatase elevation  -     Nuclear Antigen Antibody, IFA  -     Mitochondrial Antibodies, M2  -     Anti-Smooth Muscle Antibody Titer    Gastroesophageal reflux disease with esophagitis  -     Case Request; Standing  -     dextrose 5 % and sodium chloride 0.45 % infusion  -     Case Request    Pain of upper abdomen  -     Case Request; Standing  -     dextrose 5 % and sodium chloride 0.45 % infusion  -     Case Request    Full incontinence of feces  -     Case Request; Standing  -     dextrose 5 % and sodium chloride 0.45 % infusion  -     Case Request    Nausea  -     Case Request; Standing  -     dextrose 5 % and sodium chloride 0.45 % infusion  -     Case Request    Iron deficiency anemia due to chronic blood loss  -     Case Request; Standing  -     dextrose 5 % and sodium chloride 0.45 % infusion  -     Case Request    Hepatic fibrosis  -     Nuclear Antigen Antibody, IFA  -     Mitochondrial Antibodies, M2  -     Anti-Smooth Muscle Antibody Titer    Other orders  -     Follow Anesthesia Guidelines / Standing Orders; Future  -     Obtain Informed Consent; Future  -     Obtain Informed Consent; Standing  -     POC Glucose Once; Standing        Orders placed during this encounter include:  Orders Placed This Encounter   Procedures   • Nuclear Antigen Antibody, IFA   • Mitochondrial Antibodies, M2   • Anti-Smooth Muscle Antibody Titer   • Follow Anesthesia Guidelines / Standing Orders     Standing Status:   Future   • Obtain Informed Consent     Standing Status:   Future     Order Specific Question:   Informed Consent Given For     Answer:   ESOPHAGOGASTRODUODENOSCOPY and colonoscopy       Medications prescribed:  No orders of the defined types were placed in this encounter.    Discontinued Medications       Reason for Discontinue    famotidine (PEPCID) 40 MG tablet Dose adjustment        Requested Prescriptions      No prescriptions requested or  ordered in this encounter       Review and/or summary of lab tests, radiology, procedures, medications. Review and summary of old records and obtaining of history. The risks and benefits of my recommendations, as well as other treatment options were discussed with the patient today. Questions were answered.    Follow-up: Return in about 3 months (around 8/6/2019), or if symptoms worsen or fail to improve, for lab/endo prior.     ESOPHAGOGASTRODUODENOSCOPY (N/A), COLONOSCOPY (N/A)      This document has been electronically signed by Genaro Franco PA-C on May 8, 2019 5:49 PM      Results for orders placed or performed in visit on 04/30/19   Urinalysis, Microscopic Only - Urine, Clean Catch   Result Value Ref Range    RBC, UA 3-5 (A) None Seen /HPF    WBC, UA 13-20 (A) None Seen /HPF    Bacteria, UA 4+ (A) None Seen /HPF    Squamous Epithelial Cells, UA 3-6 (A) None Seen, 0-2 /HPF    Hyaline Casts, UA 3-6 None Seen /LPF    Methodology Automated Microscopy    Urinalysis With Culture If Indicated - Urine, Clean Catch   Result Value Ref Range    Color, UA Yellow Yellow, Straw    Appearance, UA Clear Clear    pH, UA <=5.0 5.0 - 8.0    Specific Gravity, UA 1.012 1.005 - 1.030    Glucose, UA Negative Negative    Ketones, UA Negative Negative    Bilirubin, UA Negative Negative    Blood, UA Negative Negative    Protein, UA Negative Negative    Leuk Esterase, UA Large (3+) (A) Negative    Nitrite, UA Positive (A) Negative    Urobilinogen, UA 0.2 E.U./dL 0.2 - 1.0 E.U./dL   CBC Auto Differential   Result Value Ref Range    WBC 7.72 4.80 - 10.80 10*3/mm3    RBC 4.39 4.20 - 5.40 10*6/mm3    Hemoglobin 11.3 (L) 12.0 - 16.0 g/dL    Hematocrit 34.5 (L) 37.0 - 47.0 %    MCV 78.6 (L) 82.0 - 98.0 fL    MCH 25.7 (L) 28.0 - 32.0 pg    MCHC 32.8 (L) 33.0 - 36.0 g/dL    RDW 14.9 12.0 - 15.0 %    RDW-SD 42.6 40.0 - 54.0 fl    MPV 11.2 6.0 - 12.0 fL    Platelets 335 130 - 400 10*3/mm3    Neutrophil % 62.4 39.0 - 78.0 %    Lymphocyte %  27.2 15.0 - 45.0 %    Monocyte % 6.6 4.0 - 12.0 %    Eosinophil % 2.7 0.0 - 4.0 %    Basophil % 0.6 0.0 - 2.0 %    Immature Grans % 0.5 0.0 - 5.0 %    Neutrophils, Absolute 4.81 1.87 - 8.40 10*3/mm3    Lymphocytes, Absolute 2.10 0.72 - 4.86 10*3/mm3    Monocytes, Absolute 0.51 0.19 - 1.30 10*3/mm3    Eosinophils, Absolute 0.21 0.00 - 0.70 10*3/mm3    Basophils, Absolute 0.05 0.00 - 0.20 10*3/mm3    Immature Grans, Absolute 0.04 0.00 - 0.05 10*3/mm3    nRBC 0.0 0.0 - 0.2 /100 WBC   aPTT   Result Value Ref Range    PTT 32.9 24.1 - 35.0 seconds   Protime-INR   Result Value Ref Range    Protime 13.5 11.9 - 14.6 Seconds    INR 1.00 0.91 - 1.09   Urine Culture - Urine, Urine, Clean Catch   Result Value Ref Range    Urine Culture >100,000 CFU/mL Escherichia coli (A)        Susceptibility    Escherichia coli - DANNY     Ampicillin >=32 Resistant ug/ml     Ampicillin + Sulbactam >=32 Resistant ug/ml     Cefazolin* 16 Intermediate ug/ml      * Cefazolin results may be used to predict the potential effectiveness of oral cephalosporins for treating uncomplicated urinary tract infections.     Cefepime <=1 Susceptible ug/ml     Ceftriaxone <=1 Susceptible ug/ml     Ertapenem <=0.5 Susceptible ug/ml     ESBL Confirmation Test NEG Negative ug/ml     Gentamicin >=16 Resistant ug/ml     Levofloxacin <=0.12 Susceptible ug/ml     Meropenem <=0.25 Susceptible ug/ml     Nitrofurantoin <=16 Susceptible ug/ml     Piperacillin + Tazobactam <=4 Susceptible ug/ml     Tobramycin 8 Intermediate ug/ml     Trimethoprim + Sulfamethoxazole >=320 Resistant ug/ml   Comprehensive Metabolic Panel   Result Value Ref Range    Glucose 186 (H) 70 - 100 mg/dL    BUN 16 5 - 21 mg/dL    Creatinine 0.59 0.50 - 1.40 mg/dL    Sodium 139 135 - 145 mmol/L    Potassium 4.1 3.5 - 5.3 mmol/L    Chloride 102 98 - 110 mmol/L    CO2 22.0 (L) 24.0 - 31.0 mmol/L    Calcium 10.3 8.4 - 10.4 mg/dL    Total Protein 7.8 6.3 - 8.7 g/dL    Albumin 4.50 3.50 - 5.00 g/dL    ALT  (SGPT) <15 0 - 54 U/L    AST (SGOT) 20 7 - 45 U/L    Alkaline Phosphatase 127 (H) 24 - 120 U/L    Total Bilirubin 0.3 0.1 - 1.0 mg/dL    eGFR Non African Amer 104 >60 mL/min/1.73    Globulin 3.3 gm/dL    A/G Ratio 1.4 1.1 - 2.5 g/dL    BUN/Creatinine Ratio 27.1 (H) 7.0 - 25.0    Anion Gap 15.0 (H) 4.0 - 13.0 mmol/L   Results for orders placed or performed in visit on 02/04/19   HCV FibroSURE   Result Value Ref Range    Fibrosis Score 0.03 0.00 - 0.21    Fibrosis Stage Comment     Necroinflammat Activity Score 0.12 0.00 - 0.17    Necroinflammat Activity Grade A0-No activity     Alpha 2-Macroglobulins, Qn 118 110 - 276 mg/dL    Haptoglobin 222 (H) 34 - 200 mg/dL    Apolipoprotein A-1 102 (L) 116 - 209 mg/dL    Total Bilirubin 0.1 0.0 - 1.2 mg/dL    GGT 20 0 - 60 IU/L    ALT (SGPT) 34 0 - 40 IU/L    HCV Qual Interp Comment     Fibrosis Scoring: Comment     Necroinflamm Activity Scoring: Comment     Limitations: Comment     Comment Comment    CBC Auto Differential   Result Value Ref Range    WBC 7.88 3.20 - 9.80 10*3/mm3    RBC 4.80 3.77 - 5.16 10*6/mm3    Hemoglobin 12.7 12.0 - 15.5 g/dL    Hematocrit 37.7 35.0 - 45.0 %    MCV 78.5 (L) 80.0 - 98.0 fL    MCH 26.5 26.5 - 34.0 pg    MCHC 33.7 31.4 - 36.0 g/dL    RDW 14.3 11.5 - 14.5 %    RDW-SD 40.6 36.4 - 46.3 fl    MPV 10.2 8.0 - 12.0 fL    Platelets 273 150 - 450 10*3/mm3    Neutrophil % 61.9 37.0 - 80.0 %    Lymphocyte % 25.4 10.0 - 50.0 %    Monocyte % 7.9 0.0 - 12.0 %    Eosinophil % 3.8 0.0 - 7.0 %    Basophil % 0.4 0.0 - 2.0 %    Immature Grans % 0.6 (H) 0.0 - 0.5 %    Neutrophils, Absolute 4.88 2.00 - 8.60 10*3/mm3    Lymphocytes, Absolute 2.00 0.60 - 4.20 10*3/mm3    Monocytes, Absolute 0.62 0.00 - 0.90 10*3/mm3    Eosinophils, Absolute 0.30 0.00 - 0.70 10*3/mm3    Basophils, Absolute 0.03 0.00 - 0.20 10*3/mm3    Immature Grans, Absolute 0.05 (H) 0.00 - 0.02 10*3/mm3     *Note: Due to a large number of results and/or encounters for the requested time period,  some results have not been displayed. A complete set of results can be found in Results Review.       Some portions of this note have been dictated using voice recognition software and may contain errors and/or omissions.

## 2019-05-06 NOTE — PATIENT INSTRUCTIONS

## 2019-05-13 ENCOUNTER — ANESTHESIA EVENT (OUTPATIENT)
Dept: PERIOP | Facility: HOSPITAL | Age: 59
End: 2019-05-13

## 2019-05-13 ENCOUNTER — APPOINTMENT (OUTPATIENT)
Dept: GENERAL RADIOLOGY | Facility: HOSPITAL | Age: 59
End: 2019-05-13

## 2019-05-13 ENCOUNTER — HOSPITAL ENCOUNTER (INPATIENT)
Facility: HOSPITAL | Age: 59
LOS: 1 days | Discharge: HOME | End: 2019-05-14
Attending: ORTHOPAEDIC SURGERY | Admitting: ORTHOPAEDIC SURGERY

## 2019-05-13 ENCOUNTER — ANESTHESIA (OUTPATIENT)
Dept: PERIOP | Facility: HOSPITAL | Age: 59
End: 2019-05-13

## 2019-05-13 DIAGNOSIS — Z74.09 IMPAIRED MOBILITY: ICD-10-CM

## 2019-05-13 DIAGNOSIS — Z78.9 IMPAIRED MOBILITY AND ADLS: ICD-10-CM

## 2019-05-13 DIAGNOSIS — Z74.09 IMPAIRED MOBILITY AND ADLS: ICD-10-CM

## 2019-05-13 PROBLEM — M48.02 STENOSIS, CERVICAL SPINE: Status: ACTIVE | Noted: 2019-05-13

## 2019-05-13 LAB
ABO GROUP BLD: NORMAL
BLD GP AB SCN SERPL QL: NEGATIVE
GLUCOSE BLDC GLUCOMTR-MCNC: 226 MG/DL (ref 70–130)
GLUCOSE BLDC GLUCOMTR-MCNC: 277 MG/DL (ref 70–130)
GLUCOSE BLDC GLUCOMTR-MCNC: 295 MG/DL (ref 70–130)
GLUCOSE BLDC GLUCOMTR-MCNC: 310 MG/DL (ref 70–130)
GLUCOSE BLDC GLUCOMTR-MCNC: 381 MG/DL (ref 70–130)
GLUCOSE BLDC GLUCOMTR-MCNC: 430 MG/DL (ref 70–130)
GLUCOSE BLDC GLUCOMTR-MCNC: 465 MG/DL (ref 70–130)
RH BLD: NEGATIVE
T&S EXPIRATION DATE: NORMAL

## 2019-05-13 PROCEDURE — 25010000002 FENTANYL CITRATE (PF) 100 MCG/2ML SOLUTION: Performed by: ANESTHESIOLOGY

## 2019-05-13 PROCEDURE — 72040 X-RAY EXAM NECK SPINE 2-3 VW: CPT

## 2019-05-13 PROCEDURE — 94760 N-INVAS EAR/PLS OXIMETRY 1: CPT

## 2019-05-13 PROCEDURE — 82962 GLUCOSE BLOOD TEST: CPT

## 2019-05-13 PROCEDURE — 25010000002 MORPHINE PER 10 MG: Performed by: ANESTHESIOLOGY

## 2019-05-13 PROCEDURE — 0RG2070 FUSION OF 2 OR MORE CERVICAL VERTEBRAL JOINTS WITH AUTOLOGOUS TISSUE SUBSTITUTE, ANTERIOR APPROACH, ANTERIOR COLUMN, OPEN APPROACH: ICD-10-PCS | Performed by: ORTHOPAEDIC SURGERY

## 2019-05-13 PROCEDURE — 25010000002 FENTANYL CITRATE (PF) 100 MCG/2ML SOLUTION: Performed by: NURSE ANESTHETIST, CERTIFIED REGISTERED

## 2019-05-13 PROCEDURE — C1713 ANCHOR/SCREW BN/BN,TIS/BN: HCPCS | Performed by: ORTHOPAEDIC SURGERY

## 2019-05-13 PROCEDURE — 25010000002 SUCCINYLCHOLINE PER 20 MG: Performed by: NURSE ANESTHETIST, CERTIFIED REGISTERED

## 2019-05-13 PROCEDURE — 94640 AIRWAY INHALATION TREATMENT: CPT

## 2019-05-13 PROCEDURE — 86850 RBC ANTIBODY SCREEN: CPT | Performed by: ORTHOPAEDIC SURGERY

## 2019-05-13 PROCEDURE — 86901 BLOOD TYPING SEROLOGIC RH(D): CPT | Performed by: ORTHOPAEDIC SURGERY

## 2019-05-13 PROCEDURE — 25010000002 ONDANSETRON PER 1 MG: Performed by: NURSE ANESTHETIST, CERTIFIED REGISTERED

## 2019-05-13 PROCEDURE — 25010000002 PROPOFOL 10 MG/ML EMULSION: Performed by: NURSE ANESTHETIST, CERTIFIED REGISTERED

## 2019-05-13 PROCEDURE — 94799 UNLISTED PULMONARY SVC/PX: CPT

## 2019-05-13 PROCEDURE — 0PP304Z REMOVAL OF INTERNAL FIXATION DEVICE FROM CERVICAL VERTEBRA, OPEN APPROACH: ICD-10-PCS | Performed by: ORTHOPAEDIC SURGERY

## 2019-05-13 PROCEDURE — 0RB30ZZ EXCISION OF CERVICAL VERTEBRAL DISC, OPEN APPROACH: ICD-10-PCS | Performed by: ORTHOPAEDIC SURGERY

## 2019-05-13 PROCEDURE — 63710000001 INSULIN LISPRO (HUMAN) PER 5 UNITS: Performed by: NURSE PRACTITIONER

## 2019-05-13 PROCEDURE — 76000 FLUOROSCOPY <1 HR PHYS/QHP: CPT

## 2019-05-13 PROCEDURE — 86900 BLOOD TYPING SEROLOGIC ABO: CPT | Performed by: ORTHOPAEDIC SURGERY

## 2019-05-13 PROCEDURE — 4A11X4G MONITORING OF PERIPHERAL NERVOUS ELECTRICAL ACTIVITY, INTRAOPERATIVE, EXTERNAL APPROACH: ICD-10-PCS | Performed by: ORTHOPAEDIC SURGERY

## 2019-05-13 PROCEDURE — 25010000002 DEXAMETHASONE PER 1 MG: Performed by: NURSE ANESTHETIST, CERTIFIED REGISTERED

## 2019-05-13 PROCEDURE — 63710000001 INSULIN LISPRO (HUMAN) PER 5 UNITS: Performed by: ORTHOPAEDIC SURGERY

## 2019-05-13 PROCEDURE — 25010000002 METOCLOPRAMIDE PER 10 MG: Performed by: ANESTHESIOLOGY

## 2019-05-13 PROCEDURE — 25010000002 MIDAZOLAM PER 1 MG: Performed by: NURSE ANESTHETIST, CERTIFIED REGISTERED

## 2019-05-13 PROCEDURE — 25010000002 PROPOFOL 1000 MG/ML EMULSION: Performed by: NURSE ANESTHETIST, CERTIFIED REGISTERED

## 2019-05-13 PROCEDURE — 25010000002 MIDAZOLAM PER 1 MG: Performed by: ANESTHESIOLOGY

## 2019-05-13 DEVICE — ANTERIOR CERVICAL PLATE ASSEMBLY, 2-LEVEL, 030MM
Type: IMPLANTABLE DEVICE | Status: FUNCTIONAL
Brand: TRESTLE LUXE

## 2019-05-13 DEVICE — 4.0MM VARIABLE ANGLE, SELF-DRILLING HEXALOBE SCREW, 12MM
Type: IMPLANTABLE DEVICE | Status: FUNCTIONAL
Brand: TRESTLE LUXE

## 2019-05-13 DEVICE — INTERBODY FUSION DEVICE
Type: IMPLANTABLE DEVICE | Status: FUNCTIONAL
Brand: FORTILINK-C IBF SYSTEM

## 2019-05-13 DEVICE — MATRX BONE VIBONE 1CC: Type: IMPLANTABLE DEVICE | Status: FUNCTIONAL

## 2019-05-13 RX ORDER — LIDOCAINE HYDROCHLORIDE 20 MG/ML
INJECTION, SOLUTION INFILTRATION; PERINEURAL AS NEEDED
Status: DISCONTINUED | OUTPATIENT
Start: 2019-05-13 | End: 2019-05-13 | Stop reason: SURG

## 2019-05-13 RX ORDER — SODIUM CHLORIDE 9 MG/ML
75 INJECTION, SOLUTION INTRAVENOUS CONTINUOUS
Status: DISCONTINUED | OUTPATIENT
Start: 2019-05-13 | End: 2019-05-14 | Stop reason: HOSPADM

## 2019-05-13 RX ORDER — SUFENTANIL CITRATE 50 UG/ML
INJECTION EPIDURAL; INTRAVENOUS AS NEEDED
Status: DISCONTINUED | OUTPATIENT
Start: 2019-05-13 | End: 2019-05-13 | Stop reason: SURG

## 2019-05-13 RX ORDER — PANTOPRAZOLE SODIUM 40 MG/1
40 TABLET, DELAYED RELEASE ORAL
Status: DISCONTINUED | OUTPATIENT
Start: 2019-05-13 | End: 2019-05-14 | Stop reason: HOSPADM

## 2019-05-13 RX ORDER — LEVOFLOXACIN 250 MG/1
250 TABLET ORAL DAILY
COMMUNITY
End: 2019-08-14

## 2019-05-13 RX ORDER — POTASSIUM CHLORIDE 750 MG/1
10 CAPSULE, EXTENDED RELEASE ORAL 2 TIMES DAILY WITH MEALS
Status: DISCONTINUED | OUTPATIENT
Start: 2019-05-13 | End: 2019-05-14 | Stop reason: HOSPADM

## 2019-05-13 RX ORDER — PROPOFOL 10 MG/ML
VIAL (ML) INTRAVENOUS AS NEEDED
Status: DISCONTINUED | OUTPATIENT
Start: 2019-05-13 | End: 2019-05-13 | Stop reason: SURG

## 2019-05-13 RX ORDER — KETAMINE HYDROCHLORIDE 50 MG/ML
INJECTION, SOLUTION, CONCENTRATE INTRAMUSCULAR; INTRAVENOUS AS NEEDED
Status: DISCONTINUED | OUTPATIENT
Start: 2019-05-13 | End: 2019-05-13 | Stop reason: SURG

## 2019-05-13 RX ORDER — TIZANIDINE 4 MG/1
4 TABLET ORAL EVERY 8 HOURS PRN
Status: DISCONTINUED | OUTPATIENT
Start: 2019-05-13 | End: 2019-05-14 | Stop reason: HOSPADM

## 2019-05-13 RX ORDER — FENTANYL CITRATE 50 UG/ML
INJECTION, SOLUTION INTRAMUSCULAR; INTRAVENOUS AS NEEDED
Status: DISCONTINUED | OUTPATIENT
Start: 2019-05-13 | End: 2019-05-13 | Stop reason: SURG

## 2019-05-13 RX ORDER — ONDANSETRON 2 MG/ML
4 INJECTION INTRAMUSCULAR; INTRAVENOUS EVERY 6 HOURS PRN
Status: DISCONTINUED | OUTPATIENT
Start: 2019-05-13 | End: 2019-05-13 | Stop reason: SDUPTHER

## 2019-05-13 RX ORDER — ROSUVASTATIN CALCIUM 10 MG/1
5 TABLET, COATED ORAL NIGHTLY
Status: DISCONTINUED | OUTPATIENT
Start: 2019-05-13 | End: 2019-05-14 | Stop reason: HOSPADM

## 2019-05-13 RX ORDER — OXYCODONE AND ACETAMINOPHEN 10; 325 MG/1; MG/1
1 TABLET ORAL ONCE AS NEEDED
Status: COMPLETED | OUTPATIENT
Start: 2019-05-13 | End: 2019-05-13

## 2019-05-13 RX ORDER — SENNA AND DOCUSATE SODIUM 50; 8.6 MG/1; MG/1
2 TABLET, FILM COATED ORAL NIGHTLY
Status: DISCONTINUED | OUTPATIENT
Start: 2019-05-13 | End: 2019-05-14 | Stop reason: HOSPADM

## 2019-05-13 RX ORDER — METOCLOPRAMIDE HYDROCHLORIDE 5 MG/ML
5 INJECTION INTRAMUSCULAR; INTRAVENOUS
Status: DISCONTINUED | OUTPATIENT
Start: 2019-05-13 | End: 2019-05-13 | Stop reason: HOSPADM

## 2019-05-13 RX ORDER — DEXAMETHASONE SODIUM PHOSPHATE 4 MG/ML
INJECTION, SOLUTION INTRA-ARTICULAR; INTRALESIONAL; INTRAMUSCULAR; INTRAVENOUS; SOFT TISSUE AS NEEDED
Status: DISCONTINUED | OUTPATIENT
Start: 2019-05-13 | End: 2019-05-13 | Stop reason: SURG

## 2019-05-13 RX ORDER — METOCLOPRAMIDE 10 MG/1
10 TABLET ORAL AS NEEDED
Status: DISCONTINUED | OUTPATIENT
Start: 2019-05-13 | End: 2019-05-14 | Stop reason: HOSPADM

## 2019-05-13 RX ORDER — SULFAMETHOXAZOLE AND TRIMETHOPRIM 800; 160 MG/1; MG/1
1 TABLET ORAL EVERY 12 HOURS SCHEDULED
Status: DISCONTINUED | OUTPATIENT
Start: 2019-05-13 | End: 2019-05-14 | Stop reason: HOSPADM

## 2019-05-13 RX ORDER — SODIUM CHLORIDE, SODIUM LACTATE, POTASSIUM CHLORIDE, CALCIUM CHLORIDE 600; 310; 30; 20 MG/100ML; MG/100ML; MG/100ML; MG/100ML
100 INJECTION, SOLUTION INTRAVENOUS CONTINUOUS
Status: DISCONTINUED | OUTPATIENT
Start: 2019-05-13 | End: 2019-05-13

## 2019-05-13 RX ORDER — MEPERIDINE HYDROCHLORIDE 25 MG/ML
12.5 INJECTION INTRAMUSCULAR; INTRAVENOUS; SUBCUTANEOUS
Status: DISCONTINUED | OUTPATIENT
Start: 2019-05-13 | End: 2019-05-13 | Stop reason: HOSPADM

## 2019-05-13 RX ORDER — LABETALOL HYDROCHLORIDE 5 MG/ML
5 INJECTION, SOLUTION INTRAVENOUS
Status: DISCONTINUED | OUTPATIENT
Start: 2019-05-13 | End: 2019-05-13 | Stop reason: HOSPADM

## 2019-05-13 RX ORDER — SODIUM CHLORIDE, SODIUM LACTATE, POTASSIUM CHLORIDE, CALCIUM CHLORIDE 600; 310; 30; 20 MG/100ML; MG/100ML; MG/100ML; MG/100ML
100 INJECTION, SOLUTION INTRAVENOUS CONTINUOUS PRN
Status: DISCONTINUED | OUTPATIENT
Start: 2019-05-13 | End: 2019-05-13

## 2019-05-13 RX ORDER — FAMOTIDINE 20 MG/1
20 TABLET, FILM COATED ORAL EVERY 12 HOURS SCHEDULED
Status: DISCONTINUED | OUTPATIENT
Start: 2019-05-13 | End: 2019-05-14 | Stop reason: HOSPADM

## 2019-05-13 RX ORDER — VASOPRESSIN 20 U/ML
INJECTION PARENTERAL AS NEEDED
Status: DISCONTINUED | OUTPATIENT
Start: 2019-05-13 | End: 2019-05-13 | Stop reason: SURG

## 2019-05-13 RX ORDER — SULFAMETHOXAZOLE AND TRIMETHOPRIM 800; 160 MG/1; MG/1
1 TABLET ORAL 2 TIMES DAILY
COMMUNITY
End: 2019-08-14

## 2019-05-13 RX ORDER — BUDESONIDE AND FORMOTEROL FUMARATE DIHYDRATE 80; 4.5 UG/1; UG/1
2 AEROSOL RESPIRATORY (INHALATION)
Status: DISCONTINUED | OUTPATIENT
Start: 2019-05-13 | End: 2019-05-14 | Stop reason: HOSPADM

## 2019-05-13 RX ORDER — LEVOFLOXACIN 250 MG/1
250 TABLET ORAL DAILY
Status: DISCONTINUED | OUTPATIENT
Start: 2019-05-14 | End: 2019-05-14 | Stop reason: HOSPADM

## 2019-05-13 RX ORDER — NALOXONE HCL 0.4 MG/ML
0.04 VIAL (ML) INJECTION AS NEEDED
Status: DISCONTINUED | OUTPATIENT
Start: 2019-05-13 | End: 2019-05-13 | Stop reason: HOSPADM

## 2019-05-13 RX ORDER — MIDAZOLAM HYDROCHLORIDE 1 MG/ML
2 INJECTION INTRAMUSCULAR; INTRAVENOUS
Status: DISCONTINUED | OUTPATIENT
Start: 2019-05-13 | End: 2019-05-13 | Stop reason: HOSPADM

## 2019-05-13 RX ORDER — SODIUM CHLORIDE 0.9 % (FLUSH) 0.9 %
1-10 SYRINGE (ML) INJECTION AS NEEDED
Status: DISCONTINUED | OUTPATIENT
Start: 2019-05-13 | End: 2019-05-13 | Stop reason: HOSPADM

## 2019-05-13 RX ORDER — AMLODIPINE BESYLATE 5 MG/1
5 TABLET ORAL DAILY
Status: DISCONTINUED | OUTPATIENT
Start: 2019-05-13 | End: 2019-05-14 | Stop reason: HOSPADM

## 2019-05-13 RX ORDER — FLUTICASONE PROPIONATE 50 MCG
1 SPRAY, SUSPENSION (ML) NASAL DAILY
Status: DISCONTINUED | OUTPATIENT
Start: 2019-05-13 | End: 2019-05-14 | Stop reason: HOSPADM

## 2019-05-13 RX ORDER — LIDOCAINE HYDROCHLORIDE 40 MG/ML
SOLUTION TOPICAL AS NEEDED
Status: DISCONTINUED | OUTPATIENT
Start: 2019-05-13 | End: 2019-05-13 | Stop reason: SURG

## 2019-05-13 RX ORDER — DEXTROSE MONOHYDRATE 25 G/50ML
25 INJECTION, SOLUTION INTRAVENOUS
Status: DISCONTINUED | OUTPATIENT
Start: 2019-05-13 | End: 2019-05-14 | Stop reason: HOSPADM

## 2019-05-13 RX ORDER — ACETAMINOPHEN 500 MG
1000 TABLET ORAL ONCE
Status: COMPLETED | OUTPATIENT
Start: 2019-05-13 | End: 2019-05-13

## 2019-05-13 RX ORDER — HYDRALAZINE HYDROCHLORIDE 20 MG/ML
5 INJECTION INTRAMUSCULAR; INTRAVENOUS
Status: DISCONTINUED | OUTPATIENT
Start: 2019-05-13 | End: 2019-05-13 | Stop reason: HOSPADM

## 2019-05-13 RX ORDER — ONDANSETRON 4 MG/1
4 TABLET, FILM COATED ORAL EVERY 6 HOURS PRN
Status: DISCONTINUED | OUTPATIENT
Start: 2019-05-13 | End: 2019-05-14 | Stop reason: HOSPADM

## 2019-05-13 RX ORDER — OXYCODONE HCL 20 MG/1
20 TABLET, FILM COATED, EXTENDED RELEASE ORAL ONCE
Status: COMPLETED | OUTPATIENT
Start: 2019-05-13 | End: 2019-05-13

## 2019-05-13 RX ORDER — PRAVASTATIN SODIUM 20 MG
20 TABLET ORAL NIGHTLY
Status: DISCONTINUED | OUTPATIENT
Start: 2019-05-13 | End: 2019-05-13

## 2019-05-13 RX ORDER — METOCLOPRAMIDE HYDROCHLORIDE 5 MG/ML
10 INJECTION INTRAMUSCULAR; INTRAVENOUS ONCE AS NEEDED
Status: COMPLETED | OUTPATIENT
Start: 2019-05-13 | End: 2019-05-13

## 2019-05-13 RX ORDER — OXYCODONE AND ACETAMINOPHEN 10; 325 MG/1; MG/1
2 TABLET ORAL EVERY 4 HOURS PRN
Status: DISCONTINUED | OUTPATIENT
Start: 2019-05-13 | End: 2019-05-14 | Stop reason: HOSPADM

## 2019-05-13 RX ORDER — SODIUM CHLORIDE 0.9 % (FLUSH) 0.9 %
3 SYRINGE (ML) INJECTION EVERY 12 HOURS SCHEDULED
Status: DISCONTINUED | OUTPATIENT
Start: 2019-05-13 | End: 2019-05-14 | Stop reason: HOSPADM

## 2019-05-13 RX ORDER — SODIUM CHLORIDE 0.9 % (FLUSH) 0.9 %
3 SYRINGE (ML) INJECTION EVERY 12 HOURS SCHEDULED
Status: DISCONTINUED | OUTPATIENT
Start: 2019-05-13 | End: 2019-05-13 | Stop reason: HOSPADM

## 2019-05-13 RX ORDER — MONTELUKAST SODIUM 10 MG/1
10 TABLET ORAL NIGHTLY
Status: DISCONTINUED | OUTPATIENT
Start: 2019-05-13 | End: 2019-05-14 | Stop reason: HOSPADM

## 2019-05-13 RX ORDER — DIPHENHYDRAMINE HCL 25 MG
25 CAPSULE ORAL NIGHTLY PRN
Status: DISCONTINUED | OUTPATIENT
Start: 2019-05-13 | End: 2019-05-14 | Stop reason: HOSPADM

## 2019-05-13 RX ORDER — MAGNESIUM HYDROXIDE 1200 MG/15ML
LIQUID ORAL AS NEEDED
Status: DISCONTINUED | OUTPATIENT
Start: 2019-05-13 | End: 2019-05-13 | Stop reason: HOSPADM

## 2019-05-13 RX ORDER — BISACODYL 10 MG
10 SUPPOSITORY, RECTAL RECTAL DAILY
Status: DISCONTINUED | OUTPATIENT
Start: 2019-05-14 | End: 2019-05-14

## 2019-05-13 RX ORDER — MIDAZOLAM HYDROCHLORIDE 1 MG/ML
INJECTION INTRAMUSCULAR; INTRAVENOUS AS NEEDED
Status: DISCONTINUED | OUTPATIENT
Start: 2019-05-13 | End: 2019-05-13 | Stop reason: SURG

## 2019-05-13 RX ORDER — SODIUM CHLORIDE 0.9 % (FLUSH) 0.9 %
3 SYRINGE (ML) INJECTION AS NEEDED
Status: DISCONTINUED | OUTPATIENT
Start: 2019-05-13 | End: 2019-05-13 | Stop reason: HOSPADM

## 2019-05-13 RX ORDER — ROCURONIUM BROMIDE 10 MG/ML
INJECTION, SOLUTION INTRAVENOUS AS NEEDED
Status: DISCONTINUED | OUTPATIENT
Start: 2019-05-13 | End: 2019-05-13 | Stop reason: SURG

## 2019-05-13 RX ORDER — SODIUM CHLORIDE 0.9 % (FLUSH) 0.9 %
3-10 SYRINGE (ML) INJECTION AS NEEDED
Status: DISCONTINUED | OUTPATIENT
Start: 2019-05-13 | End: 2019-05-14 | Stop reason: HOSPADM

## 2019-05-13 RX ORDER — ONDANSETRON 2 MG/ML
4 INJECTION INTRAMUSCULAR; INTRAVENOUS AS NEEDED
Status: DISCONTINUED | OUTPATIENT
Start: 2019-05-13 | End: 2019-05-13 | Stop reason: HOSPADM

## 2019-05-13 RX ORDER — AMITRIPTYLINE HYDROCHLORIDE 100 MG/1
200 TABLET, FILM COATED ORAL NIGHTLY
Status: DISCONTINUED | OUTPATIENT
Start: 2019-05-13 | End: 2019-05-14 | Stop reason: HOSPADM

## 2019-05-13 RX ORDER — FAMOTIDINE 10 MG/ML
20 INJECTION, SOLUTION INTRAVENOUS EVERY 12 HOURS SCHEDULED
Status: DISCONTINUED | OUTPATIENT
Start: 2019-05-13 | End: 2019-05-14 | Stop reason: HOSPADM

## 2019-05-13 RX ORDER — ONDANSETRON 2 MG/ML
INJECTION INTRAMUSCULAR; INTRAVENOUS AS NEEDED
Status: DISCONTINUED | OUTPATIENT
Start: 2019-05-13 | End: 2019-05-13 | Stop reason: SURG

## 2019-05-13 RX ORDER — GLIPIZIDE 5 MG/1
2.5 TABLET ORAL
Status: DISCONTINUED | OUTPATIENT
Start: 2019-05-14 | End: 2019-05-14 | Stop reason: HOSPADM

## 2019-05-13 RX ORDER — FENTANYL CITRATE 50 UG/ML
25 INJECTION, SOLUTION INTRAMUSCULAR; INTRAVENOUS AS NEEDED
Status: COMPLETED | OUTPATIENT
Start: 2019-05-13 | End: 2019-05-13

## 2019-05-13 RX ORDER — SODIUM CHLORIDE, SODIUM LACTATE, POTASSIUM CHLORIDE, CALCIUM CHLORIDE 600; 310; 30; 20 MG/100ML; MG/100ML; MG/100ML; MG/100ML
1000 INJECTION, SOLUTION INTRAVENOUS CONTINUOUS
Status: DISCONTINUED | OUTPATIENT
Start: 2019-05-13 | End: 2019-05-13

## 2019-05-13 RX ORDER — NICOTINE POLACRILEX 4 MG
15 LOZENGE BUCCAL
Status: DISCONTINUED | OUTPATIENT
Start: 2019-05-13 | End: 2019-05-14 | Stop reason: HOSPADM

## 2019-05-13 RX ORDER — SUCCINYLCHOLINE CHLORIDE 20 MG/ML
INJECTION INTRAMUSCULAR; INTRAVENOUS AS NEEDED
Status: DISCONTINUED | OUTPATIENT
Start: 2019-05-13 | End: 2019-05-13 | Stop reason: SURG

## 2019-05-13 RX ORDER — ONDANSETRON 2 MG/ML
4 INJECTION INTRAMUSCULAR; INTRAVENOUS EVERY 6 HOURS PRN
Status: DISCONTINUED | OUTPATIENT
Start: 2019-05-13 | End: 2019-05-14 | Stop reason: HOSPADM

## 2019-05-13 RX ORDER — FUROSEMIDE 20 MG/1
20 TABLET ORAL DAILY
Status: DISCONTINUED | OUTPATIENT
Start: 2019-05-13 | End: 2019-05-14 | Stop reason: HOSPADM

## 2019-05-13 RX ORDER — MIDAZOLAM HYDROCHLORIDE 1 MG/ML
1 INJECTION INTRAMUSCULAR; INTRAVENOUS
Status: DISCONTINUED | OUTPATIENT
Start: 2019-05-13 | End: 2019-05-13 | Stop reason: HOSPADM

## 2019-05-13 RX ORDER — IPRATROPIUM BROMIDE AND ALBUTEROL SULFATE 2.5; .5 MG/3ML; MG/3ML
3 SOLUTION RESPIRATORY (INHALATION) ONCE AS NEEDED
Status: DISCONTINUED | OUTPATIENT
Start: 2019-05-13 | End: 2019-05-13 | Stop reason: HOSPADM

## 2019-05-13 RX ORDER — MORPHINE SULFATE 2 MG/ML
2 INJECTION, SOLUTION INTRAMUSCULAR; INTRAVENOUS
Status: DISCONTINUED | OUTPATIENT
Start: 2019-05-13 | End: 2019-05-13 | Stop reason: HOSPADM

## 2019-05-13 RX ORDER — FLUMAZENIL 0.1 MG/ML
0.2 INJECTION INTRAVENOUS AS NEEDED
Status: DISCONTINUED | OUTPATIENT
Start: 2019-05-13 | End: 2019-05-13 | Stop reason: HOSPADM

## 2019-05-13 RX ADMIN — MIDAZOLAM HYDROCHLORIDE 5 MG: 1 INJECTION, SOLUTION INTRAMUSCULAR; INTRAVENOUS at 11:56

## 2019-05-13 RX ADMIN — OXYCODONE HYDROCHLORIDE AND ACETAMINOPHEN 2 TABLET: 10; 325 TABLET ORAL at 23:32

## 2019-05-13 RX ADMIN — HYDROMORPHONE HYDROCHLORIDE 1 MG: 1 INJECTION, SOLUTION INTRAMUSCULAR; INTRAVENOUS; SUBCUTANEOUS at 16:37

## 2019-05-13 RX ADMIN — FENTANYL CITRATE 25 MCG: 50 INJECTION, SOLUTION INTRAMUSCULAR; INTRAVENOUS at 15:29

## 2019-05-13 RX ADMIN — HYDROMORPHONE HYDROCHLORIDE 1 MG: 1 INJECTION, SOLUTION INTRAMUSCULAR; INTRAVENOUS; SUBCUTANEOUS at 20:35

## 2019-05-13 RX ADMIN — FENTANYL CITRATE 25 MCG: 50 INJECTION, SOLUTION INTRAMUSCULAR; INTRAVENOUS at 15:06

## 2019-05-13 RX ADMIN — FENTANYL CITRATE 100 MCG: 50 INJECTION, SOLUTION INTRAMUSCULAR; INTRAVENOUS at 13:36

## 2019-05-13 RX ADMIN — SULFAMETHOXAZOLE AND TRIMETHOPRIM 160 MG: 800; 160 TABLET ORAL at 20:38

## 2019-05-13 RX ADMIN — LIDOCAINE HYDROCHLORIDE 1 EACH: 40 SOLUTION TOPICAL at 11:58

## 2019-05-13 RX ADMIN — METOCLOPRAMIDE 10 MG: 5 INJECTION, SOLUTION INTRAMUSCULAR; INTRAVENOUS at 09:22

## 2019-05-13 RX ADMIN — FENTANYL CITRATE 25 MCG: 50 INJECTION, SOLUTION INTRAMUSCULAR; INTRAVENOUS at 15:10

## 2019-05-13 RX ADMIN — SUFENTANIL CITRATE 20 MCG: 50 INJECTION, SOLUTION EPIDURAL; INTRAVENOUS at 12:23

## 2019-05-13 RX ADMIN — FENTANYL CITRATE 25 MCG: 50 INJECTION, SOLUTION INTRAMUSCULAR; INTRAVENOUS at 15:08

## 2019-05-13 RX ADMIN — LINAGLIPTIN: 5 TABLET, FILM COATED ORAL at 17:05

## 2019-05-13 RX ADMIN — PROPOFOL 125 MCG/KG/MIN: 10 INJECTION, EMULSION INTRAVENOUS at 11:59

## 2019-05-13 RX ADMIN — LIDOCAINE HYDROCHLORIDE 50 MG: 20 INJECTION, SOLUTION INFILTRATION; PERINEURAL at 11:56

## 2019-05-13 RX ADMIN — ROCURONIUM BROMIDE 10 MG: 10 INJECTION INTRAVENOUS at 11:56

## 2019-05-13 RX ADMIN — OXYCODONE HYDROCHLORIDE AND ACETAMINOPHEN 2 TABLET: 10; 325 TABLET ORAL at 19:08

## 2019-05-13 RX ADMIN — AMITRIPTYLINE HYDROCHLORIDE 200 MG: 100 TABLET, FILM COATED ORAL at 21:28

## 2019-05-13 RX ADMIN — SUFENTANIL CITRATE 10 MCG: 50 INJECTION, SOLUTION EPIDURAL; INTRAVENOUS at 12:02

## 2019-05-13 RX ADMIN — INSULIN LISPRO 20 UNITS: 100 INJECTION, SOLUTION INTRAVENOUS; SUBCUTANEOUS at 22:24

## 2019-05-13 RX ADMIN — SODIUM CHLORIDE, POTASSIUM CHLORIDE, SODIUM LACTATE AND CALCIUM CHLORIDE 100 ML/HR: 600; 310; 30; 20 INJECTION, SOLUTION INTRAVENOUS at 08:19

## 2019-05-13 RX ADMIN — SODIUM CHLORIDE 75 ML/HR: 9 INJECTION, SOLUTION INTRAVENOUS at 16:37

## 2019-05-13 RX ADMIN — FUROSEMIDE 20 MG: 20 TABLET ORAL at 17:06

## 2019-05-13 RX ADMIN — MONTELUKAST SODIUM 10 MG: 10 TABLET, FILM COATED ORAL at 20:38

## 2019-05-13 RX ADMIN — SENNOSIDES AND DOCUSATE SODIUM 2 TABLET: 8.6; 5 TABLET ORAL at 20:39

## 2019-05-13 RX ADMIN — MIDAZOLAM 2 MG: 1 INJECTION INTRAMUSCULAR; INTRAVENOUS at 09:22

## 2019-05-13 RX ADMIN — DEXAMETHASONE SODIUM PHOSPHATE 8 MG: 4 INJECTION, SOLUTION INTRAMUSCULAR; INTRAVENOUS at 11:56

## 2019-05-13 RX ADMIN — VASOPRESSIN 1 UNITS: 20 INJECTION INTRAVENOUS at 12:25

## 2019-05-13 RX ADMIN — FENTANYL CITRATE 25 MCG: 50 INJECTION, SOLUTION INTRAMUSCULAR; INTRAVENOUS at 15:04

## 2019-05-13 RX ADMIN — FENTANYL CITRATE 25 MCG: 50 INJECTION, SOLUTION INTRAMUSCULAR; INTRAVENOUS at 15:35

## 2019-05-13 RX ADMIN — FLUTICASONE PROPIONATE 1 SPRAY: 50 SPRAY, METERED NASAL at 17:45

## 2019-05-13 RX ADMIN — INSULIN LISPRO 7 UNITS: 100 INJECTION, SOLUTION INTRAVENOUS; SUBCUTANEOUS at 17:05

## 2019-05-13 RX ADMIN — KETAMINE HYDROCHLORIDE 100 MG: 50 INJECTION, SOLUTION INTRAMUSCULAR; INTRAVENOUS at 11:56

## 2019-05-13 RX ADMIN — ROSUVASTATIN CALCIUM 5 MG: 10 TABLET, FILM COATED ORAL at 20:38

## 2019-05-13 RX ADMIN — FENTANYL CITRATE 25 MCG: 50 INJECTION, SOLUTION INTRAMUSCULAR; INTRAVENOUS at 15:32

## 2019-05-13 RX ADMIN — FENTANYL CITRATE 25 MCG: 50 INJECTION, SOLUTION INTRAMUSCULAR; INTRAVENOUS at 15:30

## 2019-05-13 RX ADMIN — CEFAZOLIN 1 G: 1 INJECTION, POWDER, FOR SOLUTION INTRAMUSCULAR; INTRAVENOUS; PARENTERAL at 12:07

## 2019-05-13 RX ADMIN — SODIUM CHLORIDE, POTASSIUM CHLORIDE, SODIUM LACTATE AND CALCIUM CHLORIDE 100 ML/HR: 600; 310; 30; 20 INJECTION, SOLUTION INTRAVENOUS at 09:53

## 2019-05-13 RX ADMIN — POTASSIUM CHLORIDE 10 MEQ: 750 CAPSULE, EXTENDED RELEASE ORAL at 17:06

## 2019-05-13 RX ADMIN — OXYCODONE HYDROCHLORIDE 20 MG: 20 TABLET, FILM COATED, EXTENDED RELEASE ORAL at 08:19

## 2019-05-13 RX ADMIN — SODIUM CHLORIDE, PRESERVATIVE FREE 3 ML: 5 INJECTION INTRAVENOUS at 21:28

## 2019-05-13 RX ADMIN — CEFAZOLIN 1 G: 1 INJECTION, POWDER, FOR SOLUTION INTRAMUSCULAR; INTRAVENOUS at 20:47

## 2019-05-13 RX ADMIN — ONDANSETRON HYDROCHLORIDE 4 MG: 2 SOLUTION INTRAMUSCULAR; INTRAVENOUS at 11:56

## 2019-05-13 RX ADMIN — BUDESONIDE AND FORMOTEROL FUMARATE DIHYDRATE 2 PUFF: 80; 4.5 AEROSOL RESPIRATORY (INHALATION) at 18:36

## 2019-05-13 RX ADMIN — MORPHINE SULFATE 2 MG: 2 INJECTION, SOLUTION INTRAMUSCULAR; INTRAVENOUS at 15:38

## 2019-05-13 RX ADMIN — FAMOTIDINE 20 MG: 20 TABLET, FILM COATED ORAL at 20:38

## 2019-05-13 RX ADMIN — PROPOFOL: 10 INJECTION, EMULSION INTRAVENOUS at 13:10

## 2019-05-13 RX ADMIN — SUFENTANIL CITRATE 10 MCG: 50 INJECTION, SOLUTION EPIDURAL; INTRAVENOUS at 11:56

## 2019-05-13 RX ADMIN — MORPHINE SULFATE 2 MG: 2 INJECTION, SOLUTION INTRAMUSCULAR; INTRAVENOUS at 15:28

## 2019-05-13 RX ADMIN — INSULIN LISPRO 9 UNITS: 100 INJECTION, SOLUTION INTRAVENOUS; SUBCUTANEOUS at 20:45

## 2019-05-13 RX ADMIN — VASOPRESSIN 1 UNITS: 20 INJECTION INTRAVENOUS at 12:38

## 2019-05-13 RX ADMIN — PANTOPRAZOLE SODIUM 40 MG: 40 TABLET, DELAYED RELEASE ORAL at 22:24

## 2019-05-13 RX ADMIN — SUFENTANIL CITRATE 10 MCG: 50 INJECTION, SOLUTION EPIDURAL; INTRAVENOUS at 12:10

## 2019-05-13 RX ADMIN — ACETAMINOPHEN 1000 MG: 500 TABLET, FILM COATED ORAL at 09:22

## 2019-05-13 RX ADMIN — PROPOFOL 70 MG: 10 INJECTION, EMULSION INTRAVENOUS at 11:56

## 2019-05-13 RX ADMIN — OXYCODONE HYDROCHLORIDE AND ACETAMINOPHEN 1 TABLET: 10; 325 TABLET ORAL at 15:05

## 2019-05-13 RX ADMIN — SUCCINYLCHOLINE CHLORIDE 100 MG: 20 INJECTION, SOLUTION INTRAMUSCULAR; INTRAVENOUS at 11:56

## 2019-05-13 RX ADMIN — TIZANIDINE 4 MG: 4 TABLET ORAL at 21:29

## 2019-05-13 NOTE — ANESTHESIA PROCEDURE NOTES
Peripheral IV    Patient location during procedure: holding area  Start time: 5/13/2019 9:50 AM  End time: 5/13/2019 9:52 AM  Line placed for Fluids/Medication Admin, Difficult Access and Sedation.  Performed By   Anesthesiologist: Shantel Ramsey MD  Preanesthetic Checklist  Completed: patient identified, site marked, surgical consent, pre-op evaluation, timeout performed, IV checked, risks and benefits discussed and monitors and equipment checked  Peripheral IV Prep   Patient position: supine   Prep: ChloraPrep  Patient monitoring: continuous pulse ox, cardiac monitor and heart rate  Peripheral IV Procedure   Laterality:right  Location:  Antecubital  Catheter size: 18 G  Guidance: ultrasound guided         Post Assessment   Dressing Type: gauze, tape and transparent.    IV Dressing/Site: clean, dry and intact

## 2019-05-13 NOTE — OP NOTE
Removal anterior instrumentation, ACDF procedure Note    Marguerite Rob  5/13/2019    Pre-op Diagnosis:     1.  Status post previous ACDF C5-6, [place], [place], 1999  2.  Status post revision ACDF C5-6, [place], [place], 1999  3.  Increasing chronic neck pain  4.  Chronic occipital headaches  5.  Left shoulder and arm radiculopathy  6.  Adjacent level degenerative disc disease C3-5, C6-7, worse C4-5  7.  Cervical facet arthropathy, worse C3-5  8.  Focal scoliosis, concave left C3-5  9.  Central and severe bilateral foraminal stenosis, left worse than right, C3-5  10.  Mild foraminal stenosis right C6-7    Post-op Diagnosis:    same    Procedure/CPT® Codes:    1.  Removal of anterior instrumentation C5-6  2.  Exploration of fusion C5-6  3.  Anterior cervical discectomy with interbody fusion C3-4, C4-5  4.  Anterior spinal instrumentation C3-5 (ATEC anterior plate and screws)  5.  Use of PEKK interbody biomechanical device for fusion C3-4, C4-5 (RTI PEKK spacers × 2)  6.  Use of locally obtained autograft bone for fusion C3-5  7.  Use of allograft bone matrix for fusion C3-5  8.  Use of operating microscope for decompression and microdissection  9.  Use of fluoroscopy for confirmation of surgical level, placement of instrumentation and PEKK spacers  10.  Intraoperative neural monitoring    Anesthesia: General    Surgeon: RONEN Sanders MD    Assistant: Gabino Drew PA-C, Saji Livingston PA-C    Estimated Blood Loss: 50 mL    Complications: None    Condition: Stable to PACU.    Indications:    The patient is a 59-year-old who sees [facility] nurse practitioner for medical issues.  She presented to the office with a history of a prior ACDF performed at C5-6 in 1999 that required revision very soon after the index procedure.  The patient relate a history of having increasing chronic neck pain, chronic occipital headaches, as well as left shoulder and arm radiculopathy.  Imaging studies revealed  adjacent level degenerative disc disease from C3-5 as well as at C6-7, that was worse at C4-5.  There was facet arthropathy worse from C3-5 as well as a focal scoliosis, that was concave to the left.  She had central and severe foraminal stenosis left side worse than right from C3-5 with only minimal stenosis on the right side of C6-7.  It was felt that the vast majority of her residual symptoms were coming from the C3-4 and C4-5 disc spaces.    After failing conservative measures, it was mutually decided that surgery would be the best option.  Risks, benefits, and complications of surgery were discussed with the patient.  The patient appeared well informed and wished to proceed.  We specifically discussed the risks of infection, blood loss, nerve root injury, CSF leak, spinal cord injury, and the possibility of incomplete resolution of symptoms.  We also discussed the possible risk of a nonunion and the potential need for additional surgery in the event of a pseudoarthrosis or hardware failure.    Operative Procedure:    After obtaining informed consent and verifying the correct operative levels, the patient was brought to the operating room and placed supine on an operating table.  A general anesthetic was provided by the anesthesia service with the assistance of an endotracheal tube.  Once this was properly positioned and secured, a bump was placed under the patient's shoulders placing the neck into a gentle extended position.  Head halter traction was then used with 10 pounds weight to maintain head position.  The shoulders were taped using 3 inch wide cloth tape to assist with radiographic visualization.  Fluoroscopy was used to identify the existing instrumentation at C5-6 as well as the disc spaces from C3 to 5, and the skin was marked for our planned incision.  The anterior neck region was then prepped and draped in usual sterile fashion.  A surgical timeout was taken to confirm this was the correct patient,  we are working at the correct levels, and that preoperative antibiotics were given in a timely fashion.    A transverse incision was then created over the anterior cervical region using a 10 blade scalpel directly centered over the levels of interest.  Dissection was carried sharply through subcutaneous tissues.  The platysma was divided in line with the incision and blunt dissection was carried along the medial border of the sternocleidomastoid muscle, lateral to the strap musculature the neck.  The carotid pulse was then palpated, and dissection was carried medial to the carotid sheath and lateral to the trachea and esophagus.  Handheld Cloward retractors along with Kitners were used to dissect through pretracheal and prevertebral fascia.      The existing instrumentation at C5-6 was easily identified and cleared of soft tissues using Bovie cautery.  A flat screwdriver was used to remove the center locking caps.  A 2.5 mm hex screwdriver was then used to remove the screws the right-sided screws both at C5 and C6 were completely lateral to the spine with 0 purchase.  The left C5 screw did have some bony purchase.  FloSeal was used where screws were removed to assist with hemostasis.  The plate was removed without difficulty.  The C5-6 fusion was then explored and felt to be solid with no gross motion identified.    A radiographic marker was placed into the disc space of C3-4 and fluoroscopy was used to confirm that we are indeed at the correct levels.  The longus coli muscles were then elevated from either side of the spine using Bovie cautery.    An initial discectomy was started by creating an annulotomy with Bovie cautery.  A Fernandes elevator was used to remove some of the disc material off of the endplates.  Disc material was initially removed using forward angled curettes and pituitaries.  Some anterior osteophytes were removed using a rongeur.  All retrieved bone was cleaned, morcellized and saved for later  packing into the disc spaces to assist with obtaining a fusion.  Voca pins were then placed to allow gentle distraction across the disc spaces.  The microscope was then positioned for the microdissection and decompression portion of the procedure.    The disc spaces of C3-4 and C4-5 were prepared in an identical fashion.  I used Kerrisons to remove remaining anterior osteophytes off of the endplates.  Straight curettes were used to remove the cartilaginous endplates and all remaining disc material.  The high-speed bur was then used to remove posterior osteophytes and to contour the endplates in preparation for fusion.  A forward angled curette was used to free up the posterior margins of the vertebral bodies, releasing the posterior longitudinal ligament.  Posterior osteophytes, posterior disc material, and the PLL were all resected using Kerrisons.  Kerrisons were also used to perform a bilateral neural foraminotomy.  At the end of the discectomy decompression, the central spinal canal and the exiting nerve roots at each level appeared to be free of compression.  Bleeding in the epidural space was controlled using thrombin with Gelfoam powder.  The wound was then copiously irrigated with saline solution.    Next, trial spacers from RTI were tapped into position into each of the disc spaces.  Once the appropriate size was determined for each disc space, actual PEKK spacers matching the same size as the trials were delivered to the sterile field.  The spacers were packed as tightly as possible with a combination of locally obtained autograft bone and allograft bone matrix.  The spacers were then malleted into position into each of the disc spaces under fluoroscopic guidance.  They were placed as PEKK interbody biomechanical devices to assist with fusion.    After confirming the PEKK spacers were properly positioned with fluoroscopy, the Voca pins were removed.  Remaining anterior osteophytes were contoured off of  the vertebral bodies using a combination of the high-speed bur and the Rongeur to allow for the best possible plate fixation.  An anterior plate from the [facility] instrumentation set was then chosen to span C3 to 5.  This was held into position using a series of screws.    Final fluoroscopy imaging confirmed adequate position of the plate and screws as well as the interbody spacers.  All implants appeared to be properly positioned with good maintenance of cervical alignment.  A final inspection of the operative field was then undertaken to ensure that we had adequate hemostasis.  Bleeding at this point was controlled with thrombin with Gelfoam powder and bipolar cautery.  A drain was then placed anterior to the spine exiting through a poke hole inferior of the incision.    Closure was accomplished by reapproximating the platysma with a 3-0 Vicryl.  Immediate subcutaneous tissues were reapproximated with a 4-0 Vicryl in a buried fashion.  Final skin closure was accomplished with Mastisol and Steri-Strips.  The wound was then washed and a sterile Bioclusive dressing was applied.  The patient was then placed into a hard cervical collar, extubated, and sent to the recovery room in good stable condition.    The patient tolerated the procedure well.  There were no complications.  We estimated blood loss to be 50 mL.  Intraoperative neural monitoring was used during the procedure.  We used motor evoked potentials, free run EMG, and SSEPs.  There were no neuro monitoring signal changes during the procedure.    Gabino Drew PA-C provided critical assistance during the procedure.  His assistance was medically necessary in order to allow the procedure to occur in the most safe and efficient manner.  He assisted with not only agent positioning and wound closure, but more importantly with retraction of delicate neurovascular structures, assistance during the discectomy decompression, as well as the placement of the PEKK spacers and  instrumentation to obtain a fusion.    RONEN Sanders MD     Date: 5/13/2019  Time: 2:23 PM

## 2019-05-13 NOTE — ANESTHESIA POSTPROCEDURE EVALUATION
"Patient: Marguerite Rob    Procedure Summary     Date:  05/13/19 Room / Location:   [facility] OR  /  [facility] OR    Anesthesia Start:  1154 Anesthesia Stop:  1446    Procedures:       REMOVAL OF INSTRUMENTATION (N/A Spine Cervical)      EXPLORATION OF FUSION C5-6, ANTERIOR CERVICAL DISCECTOMY FUSION C3-5 WITH INSTRUMENTATION C3-6 (N/A Spine Cervical) Diagnosis:  (M54.12)    Surgeon:  YINKA Sanders MD Provider:  Tate Celestin CRNA    Anesthesia Type:  general ASA Status:  3          Anesthesia Type: general  Last vitals  BP   136/77 (05/13/19 1725)   Temp   97.9 °F (36.6 °C) (05/13/19 1725)   Pulse   110 (05/13/19 1725)   Resp   20 (05/13/19 1725)     SpO2   97 % (05/13/19 1725)     Post Anesthesia Care and Evaluation    Patient location during evaluation: PACU  Patient participation: complete - patient participated  Level of consciousness: awake and alert  Pain management: adequate  Airway patency: patent  Anesthetic complications: No anesthetic complications    Cardiovascular status: acceptable  Respiratory status: acceptable  Hydration status: acceptable    Comments: Blood pressure 136/77, pulse 110, temperature 97.9 °F (36.6 °C), temperature source Oral, resp. rate 20, height 160 cm (62.99\"), weight 69.7 kg (153 lb 10.6 oz), SpO2 97 %, not currently breastfeeding.    Pt discharged from PACU based on seun score >8  No anesthesia care post op    "

## 2019-05-13 NOTE — ANESTHESIA PROCEDURE NOTES
Airway  Urgency: elective    Airway not difficult    General Information and Staff    Patient location during procedure: OR  CRNA: Tate Celestin CRNA    Indications and Patient Condition  Indications for airway management: airway protection    Preoxygenated: yes  Mask difficulty assessment: 1 - vent by mask    Final Airway Details  Final airway type: endotracheal airway      Successful airway: ETT  Cuffed: yes   Successful intubation technique: direct laryngoscopy  Endotracheal tube insertion site: oral  Blade: Marcano  Blade size: 2  ETT size (mm): 7.0  Cormack-Lehane Classification: grade IIa - partial view of glottis  Placement verified by: chest auscultation and capnometry   Cuff volume (mL): 5  Measured from: lips  ETT to lips (cm): 20  Number of attempts at approach: 1

## 2019-05-13 NOTE — ANESTHESIA PREPROCEDURE EVALUATION
Anesthesia Evaluation     Patient summary reviewed   no history of anesthetic complications:  NPO Solid Status: > 8 hours  NPO Liquid Status: > 8 hours           Airway   Mallampati: II  TM distance: >3 FB  Neck ROM: limited  Dental    (+) poor dentition        Pulmonary    (+) a smoker Current Abstained day of surgery, COPD,   Cardiovascular     ECG reviewed    (+) hypertension, hyperlipidemia,   (-) past MI, CAD, cardiac stents      Neuro/Psych  (-) seizures, TIA, CVA  GI/Hepatic/Renal/Endo    (+)  GERD,  hepatitis C, liver disease, diabetes mellitus,     Musculoskeletal     Abdominal    Substance History   (+) alcohol use,      OB/GYN          Other                      Anesthesia Plan    ASA 3     general     intravenous induction   Anesthetic plan, all risks, benefits, and alternatives have been provided, discussed and informed consent has been obtained with: patient.

## 2019-05-14 VITALS
BODY MASS INDEX: 27.23 KG/M2 | HEIGHT: 63 IN | HEART RATE: 112 BPM | RESPIRATION RATE: 16 BRPM | DIASTOLIC BLOOD PRESSURE: 69 MMHG | SYSTOLIC BLOOD PRESSURE: 119 MMHG | OXYGEN SATURATION: 97 % | TEMPERATURE: 97.4 F | WEIGHT: 153.66 LBS

## 2019-05-14 LAB
ANION GAP SERPL CALCULATED.3IONS-SCNC: 12 MMOL/L (ref 4–13)
BASOPHILS # BLD AUTO: 0.04 10*3/MM3 (ref 0–0.2)
BASOPHILS NFR BLD AUTO: 0.4 % (ref 0–2)
BUN BLD-MCNC: 13 MG/DL (ref 5–21)
BUN/CREAT SERPL: 21.7 (ref 7–25)
CALCIUM SPEC-SCNC: 9.6 MG/DL (ref 8.4–10.4)
CHLORIDE SERPL-SCNC: 97 MMOL/L (ref 98–110)
CO2 SERPL-SCNC: 28 MMOL/L (ref 24–31)
CREAT BLD-MCNC: 0.6 MG/DL (ref 0.5–1.4)
DEPRECATED RDW RBC AUTO: 42.4 FL (ref 40–54)
EOSINOPHIL # BLD AUTO: 0.07 10*3/MM3 (ref 0–0.7)
EOSINOPHIL NFR BLD AUTO: 0.8 % (ref 0–4)
ERYTHROCYTE [DISTWIDTH] IN BLOOD BY AUTOMATED COUNT: 14.4 % (ref 12–15)
GFR SERPL CREATININE-BSD FRML MDRD: 102 ML/MIN/1.73
GLUCOSE BLD-MCNC: 211 MG/DL (ref 70–100)
GLUCOSE BLDC GLUCOMTR-MCNC: 201 MG/DL (ref 70–130)
GLUCOSE BLDC GLUCOMTR-MCNC: 221 MG/DL (ref 70–130)
GLUCOSE BLDC GLUCOMTR-MCNC: 255 MG/DL (ref 70–130)
HCT VFR BLD AUTO: 34.7 % (ref 37–47)
HGB BLD-MCNC: 11.2 G/DL (ref 12–16)
IMM GRANULOCYTES # BLD AUTO: 0.04 10*3/MM3 (ref 0–0.05)
IMM GRANULOCYTES NFR BLD AUTO: 0.4 % (ref 0–5)
LYMPHOCYTES # BLD AUTO: 1.77 10*3/MM3 (ref 0.72–4.86)
LYMPHOCYTES NFR BLD AUTO: 19.2 % (ref 15–45)
MCH RBC QN AUTO: 26 PG (ref 28–32)
MCHC RBC AUTO-ENTMCNC: 32.3 G/DL (ref 33–36)
MCV RBC AUTO: 80.7 FL (ref 82–98)
MONOCYTES # BLD AUTO: 0.65 10*3/MM3 (ref 0.19–1.3)
MONOCYTES NFR BLD AUTO: 7 % (ref 4–12)
NEUTROPHILS # BLD AUTO: 6.67 10*3/MM3 (ref 1.87–8.4)
NEUTROPHILS NFR BLD AUTO: 72.2 % (ref 39–78)
NRBC BLD AUTO-RTO: 0 /100 WBC (ref 0–0.2)
PLATELET # BLD AUTO: 284 10*3/MM3 (ref 130–400)
PMV BLD AUTO: 11.5 FL (ref 6–12)
POTASSIUM BLD-SCNC: 4.1 MMOL/L (ref 3.5–5.3)
RBC # BLD AUTO: 4.3 10*6/MM3 (ref 4.2–5.4)
SODIUM BLD-SCNC: 137 MMOL/L (ref 135–145)
WBC NRBC COR # BLD: 9.24 10*3/MM3 (ref 4.8–10.8)

## 2019-05-14 PROCEDURE — 82962 GLUCOSE BLOOD TEST: CPT

## 2019-05-14 PROCEDURE — 63710000001 INSULIN LISPRO (HUMAN) PER 5 UNITS: Performed by: NURSE PRACTITIONER

## 2019-05-14 PROCEDURE — 97165 OT EVAL LOW COMPLEX 30 MIN: CPT

## 2019-05-14 PROCEDURE — 97161 PT EVAL LOW COMPLEX 20 MIN: CPT | Performed by: PHYSICAL THERAPIST

## 2019-05-14 PROCEDURE — 85025 COMPLETE CBC W/AUTO DIFF WBC: CPT | Performed by: ORTHOPAEDIC SURGERY

## 2019-05-14 PROCEDURE — 25010000003 CEFAZOLIN PER 500 MG: Performed by: ORTHOPAEDIC SURGERY

## 2019-05-14 PROCEDURE — 80048 BASIC METABOLIC PNL TOTAL CA: CPT | Performed by: ORTHOPAEDIC SURGERY

## 2019-05-14 PROCEDURE — 94799 UNLISTED PULMONARY SVC/PX: CPT

## 2019-05-14 PROCEDURE — 94760 N-INVAS EAR/PLS OXIMETRY 1: CPT

## 2019-05-14 RX ORDER — OXYCODONE AND ACETAMINOPHEN 10; 325 MG/1; MG/1
1 TABLET ORAL EVERY 4 HOURS PRN
Qty: 42 TABLET | Refills: 0
Start: 2019-05-14 | End: 2019-05-23

## 2019-05-14 RX ORDER — BISACODYL 10 MG
10 SUPPOSITORY, RECTAL RECTAL DAILY PRN
Status: DISCONTINUED | OUTPATIENT
Start: 2019-05-14 | End: 2019-05-14 | Stop reason: HOSPADM

## 2019-05-14 RX ADMIN — PANTOPRAZOLE SODIUM 40 MG: 40 TABLET, DELAYED RELEASE ORAL at 05:14

## 2019-05-14 RX ADMIN — HYDROMORPHONE HYDROCHLORIDE 1 MG: 1 INJECTION, SOLUTION INTRAMUSCULAR; INTRAVENOUS; SUBCUTANEOUS at 01:14

## 2019-05-14 RX ADMIN — SULFAMETHOXAZOLE AND TRIMETHOPRIM 160 MG: 800; 160 TABLET ORAL at 08:38

## 2019-05-14 RX ADMIN — INSULIN LISPRO 12 UNITS: 100 INJECTION, SOLUTION INTRAVENOUS; SUBCUTANEOUS at 11:49

## 2019-05-14 RX ADMIN — LEVOFLOXACIN 250 MG: 250 TABLET, FILM COATED ORAL at 08:38

## 2019-05-14 RX ADMIN — FAMOTIDINE 20 MG: 20 TABLET, FILM COATED ORAL at 08:38

## 2019-05-14 RX ADMIN — BUDESONIDE AND FORMOTEROL FUMARATE DIHYDRATE 2 PUFF: 80; 4.5 AEROSOL RESPIRATORY (INHALATION) at 07:31

## 2019-05-14 RX ADMIN — AMLODIPINE BESYLATE 5 MG: 5 TABLET ORAL at 08:38

## 2019-05-14 RX ADMIN — POTASSIUM CHLORIDE 10 MEQ: 750 CAPSULE, EXTENDED RELEASE ORAL at 08:38

## 2019-05-14 RX ADMIN — INSULIN LISPRO 8 UNITS: 100 INJECTION, SOLUTION INTRAVENOUS; SUBCUTANEOUS at 08:43

## 2019-05-14 RX ADMIN — OXYCODONE HYDROCHLORIDE AND ACETAMINOPHEN 2 TABLET: 10; 325 TABLET ORAL at 05:13

## 2019-05-14 RX ADMIN — GLIPIZIDE 2.5 MG: 5 TABLET ORAL at 08:38

## 2019-05-14 RX ADMIN — OXYCODONE HYDROCHLORIDE AND ACETAMINOPHEN 2 TABLET: 10; 325 TABLET ORAL at 11:41

## 2019-05-14 RX ADMIN — HYDROMORPHONE HYDROCHLORIDE 1 MG: 1 INJECTION, SOLUTION INTRAMUSCULAR; INTRAVENOUS; SUBCUTANEOUS at 07:52

## 2019-05-14 RX ADMIN — FLUTICASONE PROPIONATE 1 SPRAY: 50 SPRAY, METERED NASAL at 08:38

## 2019-05-14 RX ADMIN — LINAGLIPTIN: 5 TABLET, FILM COATED ORAL at 08:38

## 2019-05-14 RX ADMIN — CEFAZOLIN 1 G: 1 INJECTION, POWDER, FOR SOLUTION INTRAMUSCULAR; INTRAVENOUS at 04:45

## 2019-05-14 RX ADMIN — FUROSEMIDE 20 MG: 20 TABLET ORAL at 08:38

## 2019-05-14 NOTE — THERAPY DISCHARGE NOTE
Acute Care - Occupational Therapy Initial Eval/Discharge  [facility]     Patient Name: Marguerite Rob  : 1960  MRN: [id number]  Today's Date: 2019  Onset of Illness/Injury or Date of Surgery: 19  Date of Referral to OT: 19  Referring Physician: Dr. Sanders      Admit Date: 2019       ICD-10-CM ICD-9-CM   1. Impaired mobility Z74.09 799.89   2. Impaired mobility and ADLs Z74.09 799.89     Patient Active Problem List   Diagnosis   • Cigarette nicotine dependence, uncomplicated   • Chronic allergic rhinitis   • COPD with asthma (CMS/HCC)   • Chest pain   • Dyspnea   • Benign essential HTN   • Mixed dyslipidemia   • TORRES (nonalcoholic steatohepatitis)   • GERD with esophagitis   • DM (diabetes mellitus) (CMS/HCC)   • Mold exposure   • Smoker   • Overweight (BMI 25.0-29.9)   • Gastroesophageal reflux disease with esophagitis   • Pain of upper abdomen   • Full incontinence of feces   • Nausea   • Iron deficiency anemia due to chronic blood loss   • Stenosis, cervical spine     Past Medical History:   Diagnosis Date   • Alcoholic fatty liver    • Alkaline phosphatase raised    • Anxiety    • Asthma     IgE-MEDIATED ALLERGIC ASTHMA   • Backache     CHRONIC   • CHF (congestive heart failure) (CMS/HCC)    • Chronic hepatitis C (CMS/HCC)     2b. Fibrosure .05/F0, necroinflam .14/A0. Repeat .05/F0, .13/A0      • Chronic neck pain    • Constipation    • COPD (chronic obstructive pulmonary disease) (CMS/HCC)    • Diabetes (CMS/HCC)    • Diarrhea    • Elevated levels of transaminase & lactic acid dehydrogenase    • Emphysema, unspecified (CMS/HCC)    • Generalized abdominal pain    • GERD (gastroesophageal reflux disease)     WITH ESOPHAGITIS   • Hepatitis    • High risk sexual behavior    • Hypertension    • Hypokalemia    • Irritable bowel syndrome (IBS)    • Multiple joint pain    • Need for vaccination    • Numbness and tingling in left arm    • On long term drug therapy    • Shoulder pain    •  Tobacco dependence syndrome      Past Surgical History:   Procedure Laterality Date   • ANTERIOR CERVICAL DISCECTOMY W/ FUSION N/A 5/13/2019    Procedure: EXPLORATION OF FUSION C5-6, ANTERIOR CERVICAL DISCECTOMY FUSION C3-5 WITH INSTRUMENTATION C3-6;  Surgeon: YINKA Sanders MD;  Location: [facility] OR;  Service: Orthopedic Spine   • APPENDECTOMY     • CERVICAL FUSION  1999    C5-6   • CHOLECYSTECTOMY     • COLONOSCOPY  02/08/2016   • COLONOSCOPY W/ POLYPECTOMY  02/08/2016    External and internal hemorrhoids.The examination was otherwise normal.Fluid aspiration performed.Several biopsies obtained in the entire colon.   • ENDOSCOPY  02/08/2016    Mildly severe esophagitis.Gastritis.Normal examined duodenum.Several biopsies obtained in the lower third of the esophagus.Several biopsies obtained in the gastric antrum.Several biopsies obtained in the first part of the duodenum.   • ENDOSCOPY  08/13/2012    Esophagitis seen. Biopsy taken. Gastritis in stomach. Biopsy taken. Normal duodenum. Biopsy taken.   • ENDOSCOPY AND COLONOSCOPY  08/13/2012    Internal & external hemorrhoids found. Scope could not pass into the TI.   • FINGER SURGERY Left 04/2019    thumb - piece of metal removed   • HARDWARE REMOVAL N/A 5/13/2019    Procedure: REMOVAL OF INSTRUMENTATION;  Surgeon: YINKA Sanders MD;  Location: [facility];  Service: Orthopedic Spine   • HEMORRHOIDECTOMY     • HERNIA REPAIR      pt states no hernia was found so no procedure was done so they closed her back up   • INJECTION OF MEDICATION  08/01/2013    METHYLPREDNISONE, X2   • INJECTION OF MEDICATION  11/03/2011    KENALOG   • INJECTION OF MEDICATION  02/24/2011    ROCEPHIN   • KNEE SURGERY Right    • TONSILLECTOMY AND ADENOIDECTOMY     • TOTAL ABDOMINAL HYSTERECTOMY WITH SALPINGO OOPHORECTOMY     • UPPER GASTROINTESTINAL ENDOSCOPY  02/08/2016          OT ASSESSMENT FLOWSHEET (last 12 hours)      Occupational Therapy Evaluation     Row Name 05/14/19 0753                    OT Evaluation Time/Intention    Subjective Information  complains of;pain trouble swallowing liquid  -CS        Document Type  evaluation  -CS        Mode of Treatment  occupational therapy  -CS        Patient Effort  good  -CS           General Information    Patient Profile Reviewed?  yes  -CS        Onset of Illness/Injury or Date of Surgery  05/13/19  -CS        Referring Physician  Dr. Sanders  -CS        Patient Observations  alert;cooperative;agree to therapy  -CS        Patient/Family Observations  no family present  -CS        General Observations of Patient  fowlers, cervical collar in place, no apparent distress  -CS        Prior Level of Function  independent:;all household mobility;community mobility;ADL's;dressing;bathing  -CS        Equipment Currently Used at Home  nebulizer  -CS        Pertinent History of Current Functional Problem  s/p removal of instrumentation, exploration of fusion C5-6, anterior cervical discectomy fusion C3-5 with instrumentation C3-6  -CS        Existing Precautions/Restrictions  fall;spinal brace to be worn at all times, neck precautions  -CS        Risks Reviewed  patient:;LOB;nausea/vomiting;dizziness;increased discomfort  -CS        Benefits Reviewed  patient:;improve function;increase independence;increase strength;increase balance;decrease pain  -CS           Relationship/Environment    Primary Source of Support/Comfort  child(lilliana)  -CS        Lives With  alone  -CS           Resource/Environmental Concerns    Current Living Arrangements  home/apartment/condo  -CS        Resource/Environmental Concerns  none  -CS           Home Main Entrance    Number of Stairs, Main Entrance  one  -CS        Stair Railings, Main Entrance  none  -CS           Cognitive Assessment/Interventions    Additional Documentation  Cognitive Assessment/Intervention (Group)  -CS           Cognitive Assessment/Intervention- PT/OT    Affect/Mental Status (Cognitive)  WFL  -CS         Orientation Status (Cognition)  oriented x 4  -CS        Follows Commands (Cognition)  WFL  -CS        Cognitive Function (Cognitive)  WFL  -CS        Personal Safety Interventions  fall prevention program maintained;gait belt;nonskid shoes/slippers when out of bed;supervised activity  -CS           Bed Mobility Assessment/Treatment    Bed Mobility Assessment/Treatment  bed mobility (all) activities  -CS        [place] Level (Bed Mobility)  independent  -CS           Functional Mobility    Functional Mobility- Ind. Level  independent  -CS        Functional Mobility- Comment  Pt walked length of hallway and back to bedside  -CS           Transfer Assessment/Treatment    Transfer Assessment/Treatment  stand-sit transfer;sit-stand transfer  -CS           Sit-Stand Transfer    Sit-Stand [place] (Transfers)  independent  -CS           Stand-Sit Transfer    Stand-Sit [place] (Transfers)  independent  -CS           ADL Assessment/Intervention    BADL Assessment/Intervention  lower body dressing  -CS           Lower Body Dressing Assessment/Training    Lower Body Dressing [place] Level  don;doff;socks;undergarment  -CS        Lower Body Dressing Position  edge of bed sitting;unsupported standing  -CS           General ROM    GENERAL ROM COMMENTS  BUE AROM WFL  -CS           MMT (Manual Muscle Testing)    General MMT Comments  BUE strength: 4+/5  -CS           Sensory Assessment/Intervention    Sensory General Assessment  no sensation deficits identified  -CS           Positioning and Restraints    Pre-Treatment Position  in bed  -CS        Post Treatment Position  bed  -CS        In Bed  sitting EOB;call light within reach;encouraged to call for assist;side rails up x2  -CS           Pain Scale: Numbers Pre/Post-Treatment    Pain Scale: Numbers, Pretreatment  8/10  -CS        Pain Scale: Numbers, Post-Treatment  8/10  -CS        Pain Location - Side  Left  -CS        Pain Location  neck  -CS        Pain  Intervention(s)  Medication (See MAR);Repositioned;Ambulation/increased activity  -CS           Orthotics & Prosthetics Management    Orthosis Location  spinal orthosis  -CS           Spinal Orthosis Management    Type (Spinal Orthosis)  cervical collar, hard  -CS        Functional Design (Spinal Orthosis)  static orthosis  -CS        Therapeutic Indications (Spinal Orthosis)  post-op positioning/protection;stabilization and support;pain management  -CS        Wearing Schedule (Spinal Orthosis)  wear full time  -CS        Orthosis Training (Spinal Orthosis)  patient;caregiver;all orthosis skills  -CS        Compliance/Wearing Issues (Spinal Orthosis)  patient/caregiver comprehend strategies;patient/caregiver comprehend rationale for orthosis  -CS           Wound 05/13/19 1412 Other (See comments) neck incision    Wound - Properties Group Date first assessed: 05/13/19  -JR Time first assessed: 1412  -JR Side: Other (See comments)  -JR Location: neck  -JR Type: incision  -JR       Plan of Care Review    Plan of Care Reviewed With  patient;daughter  -CS           Clinical Impression (OT)    Date of Referral to OT  05/13/19  -CS        OT Diagnosis  Impaired ADLs and functional mobility  -CS        Patient/Family Goals Statement (OT Eval)  to go home  -CS        Criteria for Skilled Therapeutic Interventions Met (OT Eval)  no;no problems identified which require skilled intervention  -CS        Therapy Frequency (OT Eval)  evaluation only  -CS        Care Plan Review (OT)  evaluation/treatment results reviewed  -CS        Anticipated Discharge Disposition (OT)  home with assist  -CS           Living Environment    Home Accessibility  stairs to enter home;tub/shower is not walk in  -CS          User Key  (r) = Recorded By, (t) = Taken By, (c) = Cosigned By    Initials Name Effective Dates    CS Irena Pickens, OTR/L, CNT 04/02/19 -     JR Carly Bennett RN 01/31/19 -               OT Recommendation and  Plan  Outcome Summary/Treatment Plan (OT)  Anticipated Discharge Disposition (OT): home with assist  Therapy Frequency (OT Eval): evaluation only  Plan of Care Review  Plan of Care Reviewed With: patient  Plan of Care Reviewed With: patient  Outcome Summary: OT evaluation completed.  Pt demo no further need for skilled OT services as pt is independent with functional mobility and ADLs.  OT will sign off with recommendations for pt to discharge home with assist.         Outcome Measures     Row Name 05/14/19 0900 05/14/19 0753          How much help from another person do you currently need...    Turning from your back to your side while in flat bed without using bedrails?  4  -SB  --     Moving from lying on back to sitting on the side of a flat bed without bedrails?  4  -SB  --     Moving to and from a bed to a chair (including a wheelchair)?  4  -SB  --     Standing up from a chair using your arms (e.g., wheelchair, bedside chair)?  4  -SB  --     Climbing 3-5 steps with a railing?  3  -SB  --     To walk in hospital room?  4  -SB  --     AM-PAC 6 Clicks Score  23  -SB  --        How much help from another is currently needed...    Putting on and taking off regular lower body clothing?  --  4  -CS     Bathing (including washing, rinsing, and drying)  --  4  -CS     Toileting (which includes using toilet bed pan or urinal)  --  4  -CS     Putting on and taking off regular upper body clothing  --  4  -CS     Taking care of personal grooming (such as brushing teeth)  --  4  -CS     Eating meals  --  4  -CS     Score  --  24  -CS        Functional Assessment    Outcome Measure Options  AM-PAC 6 Clicks Basic Mobility (PT)  -SB  AM-PAC 6 Clicks Daily Activity (OT)  -CS       User Key  (r) = Recorded By, (t) = Taken By, (c) = Cosigned By    Initials Name Provider Type    Irena Pro, OTR/L, CNT Occupational Therapist    Obdulia Hogue PT Physical Therapist          Time Calculation:   Time Calculation- OT      Row Name 05/14/19 1115             Time Calculation- OT    OT Start Time  0746  -CS      OT Stop Time  0829  -CS      OT Time Calculation (min)  43 min  -CS      OT Received On  05/14/19  -CS        User Key  (r) = Recorded By, (t) = Taken By, (c) = Cosigned By    Initials Name Provider Type    CS Irena Pickens, OTR/L, BERTO Occupational Therapist        Therapy Suggested Charges     Code   Minutes Charges    None           Therapy Charges for Today     Code Description Service Date Service Provider Modifiers Qty    50296072569 HC OT EVAL LOW COMPLEXITY 3 5/14/2019 Irena Pickens OTR/L, BERTO GO 1               OT Discharge Summary  Anticipated Discharge Disposition (OT): home with assist  Reason for Discharge: Independent, Discharge from facility  Outcomes Achieved: Discharge from facility occurred on same date as evluation  Discharge Destination: Home with assist    MIGUEL Mistry/L, CNT  5/14/2019

## 2019-05-14 NOTE — PLAN OF CARE
"Problem: Patient Care Overview  Goal: Plan of Care Review  Outcome: Ongoing (interventions implemented as appropriate)   05/14/19 1111   Coping/Psychosocial   Plan of Care Reviewed With patient   Plan of Care Review   Progress no change   OTHER   Outcome Summary Received phone call from OT re: concerns with patient stating she was having some difficulty swallowing liquids. Explained SLP needed order, however did do screening. No s/s of aspiration seen with water. Patient reports \"feels like it wants to come up\". SLP explained change in swallow due to swelling and likely will improve in a few days but to notify MD if not improved. She is able to take PO at this time. Encouraged small bites and sips.          "

## 2019-05-14 NOTE — PLAN OF CARE
Problem: Patient Care Overview  Goal: Plan of Care Review  Outcome: Ongoing (interventions implemented as appropriate)   05/14/19 5387   Coping/Psychosocial   Plan of Care Reviewed With patient;family   Plan of Care Review   Progress no change   OTHER   Outcome Summary PT eval completed. Pt alert and oriented x4. Pt educated on c-collar donning and doffing, and spinal precautions. Pt performed bed mob and t/f ind. Pt ambulated 200 feet with sup and performed 5 steps with CGA and handrails. Pt with no deficits that require skilled PT at this time. Anticipate d/c home with assist from family.

## 2019-05-14 NOTE — PROGRESS NOTES
[facility] Medicine Services  INPATIENT PROGRESS NOTE    Length of Stay: 1  Date of Admission: 5/13/2019  Primary Care Physician: Katiuska Macias APRN    Subjective   Chief Complaint: follow up diabetes.  HPI   Sitting up in bed.  Neck brace collar in place.  She reports that her sugars normally run about 230 at home.  Glyburide was recently added to her regimen.  She checks her sugars at home.  She denies chest pain, palpitations or shortness of breath.  She denies nausea, vomiting or abdominal pain.  She hopes to go home soon.  Pain controlled at present.  She is able to get up and ambulate.  Ports constipation.    Review of Systems   All pertinent negatives and positives are as above. All other systems have been reviewed and are negative unless otherwise stated.     Objective    Temp:  [97.4 °F (36.3 °C)-98.5 °F (36.9 °C)] 97.4 °F (36.3 °C)  Heart Rate:  [105-116] 112  Resp:  [12-24] 16  BP: (111-154)/(56-96) 119/69  Physical Exam   Constitutional: She is oriented to person, place, and time. She appears well-developed and well-nourished.   HENT:   Head: Normocephalic and atraumatic.   Eyes: EOM are normal. Pupils are equal, round, and reactive to light.   Neck: Normal range of motion. Neck supple.   Cardiovascular: Normal rate, regular rhythm, normal heart sounds and intact distal pulses. Exam reveals no gallop and no friction rub.   No murmur heard.  Pulmonary/Chest: Effort normal and breath sounds normal. She has no wheezes. She has no rales.   No oxygen in use   Abdominal: Soft. Bowel sounds are normal. She exhibits no distension.   Genitourinary:   Genitourinary Comments: Voiding.   Musculoskeletal: She exhibits no edema.   Neurological: She is alert and oriented to person, place, and time.   Cervical collar in place.  Anterior neck dressing clean dry and intact   Skin: Skin is warm and dry.   Psychiatric: She has a normal mood and affect. Her behavior is normal.  Judgment and thought content normal.     Results Review:  I have reviewed the labs, radiology results, and diagnostic studies.    Laboratory Data:   Results from last 7 days   Lab Units 05/14/19  0559   WBC 10*3/mm3 9.24   HEMOGLOBIN g/dL 11.2*   HEMATOCRIT % 34.7*   PLATELETS 10*3/mm3 284        Results from last 7 days   Lab Units 05/14/19  0559   SODIUM mmol/L 137   POTASSIUM mmol/L 4.1   CHLORIDE mmol/L 97*   CO2 mmol/L 28.0   BUN mg/dL 13   CREATININE mg/dL 0.60   CALCIUM mg/dL 9.6   GLUCOSE mg/dL 211*        Imaging Results (all)     Procedure Component Value Units Date/Time    XR Spine Cervical 2 View [[id number]] Collected:  05/13/19 1426     Updated:  05/14/19 0831    Narrative:       INTRAOPERATIVE FLUOROSCOPIC GUIDANCE 05/13/2019      INDICATION: Intraoperative fluoroscopic guidance. Removal of  instrumentation.      TECHNIQUE: 4 fluoroscopic images from the operating room were submitted  for evaluation. Please note, no radiologist was in attendance for  acquisition of these images. These images are available for future  reference to the attending surgeon. Total fluoroscopic time was 5  seconds.      FINDINGS: Intraoperative images related to removal of cervical fusion  hardware.        Impression:       1. Intraoperative fluoroscopic guidance as described.   2. Please refer to real-time fluoroscopy and operative report for full  details.  This report was finalized on 05/13/2019 14:30 by Dr. Loluou Palacios MD.    FL C Arm During Surgery [[id number]] Collected:  05/13/19 1426     Updated:  05/14/19 0831    Narrative:       INTRAOPERATIVE FLUOROSCOPIC GUIDANCE 05/13/2019      INDICATION: Intraoperative fluoroscopic guidance. Removal of  instrumentation.      TECHNIQUE: 4 fluoroscopic images from the operating room were submitted  for evaluation. Please note, no radiologist was in attendance for  acquisition of these images. These images are available for future  reference to the attending surgeon. Total  fluoroscopic time was 5  seconds.      FINDINGS: Intraoperative images related to removal of cervical fusion  hardware.        Impression:       1. Intraoperative fluoroscopic guidance as described.   2. Please refer to real-time fluoroscopy and operative report for full  details.  This report was finalized on 05/13/2019 14:30 by Dr. Loulou Palacios MD.          Intake/Output    Intake/Output Summary (Last 24 hours) at 5/14/2019 1140  Last data filed at 5/14/2019 0710  Gross per 24 hour   Intake 1400 ml   Output 4070 ml   Net -2670 ml       Scheduled Meds    amitriptyline 200 mg Oral Nightly   amLODIPine 5 mg Oral Daily   budesonide-formoterol 2 puff Inhalation BID - RT   ceFAZolin 1 g Intravenous Q8H   famotidine 20 mg Intravenous Q12H   Or      famotidine 20 mg Oral Q12H   fluticasone 1 spray Nasal Daily   furosemide 20 mg Oral Daily   glipiZIDE 2.5 mg Oral QAM AC   insulin lispro 0-24 Units Subcutaneous 4x Daily With Meals & Nightly   levoFLOXacin 250 mg Oral Daily   linagliptin-metFORMIN ER (JENTADUETO XR) 5-500 mg combo dose  Oral BID With Meals   montelukast 10 mg Oral Nightly   pantoprazole 40 mg Oral Q AM   potassium chloride 10 mEq Oral BID With Meals   rosuvastatin 5 mg Oral Nightly   sennosides-docusate sodium 2 tablet Oral Nightly   sodium chloride 3 mL Intravenous Q12H   sulfamethoxazole-trimethoprim 1 tablet Oral Q12H       I have reviewed the patient current medications.     Assessment/Plan     Active Hospital Problems    Diagnosis   • **DM (diabetes mellitus) (CMS/HCC)   • Stenosis, cervical spine   • Benign essential HTN   • TORRES (nonalcoholic steatohepatitis)   • COPD with asthma (CMS/HCC)   • Cigarette nicotine dependence, uncomplicated     Plan:  1.  Cervical discectomy 5/13 Dr. Sanders  2.  Glucoses 221, 211, 201, 295.  Sliding scale insulin coverage before meals and at bedtime  3.  Glyburide and janumet resumed.  She reports that her blood sugars normally run 230 at home and glyburide was  recently added to her diabetic regimen.  4.  Dulcolax as needed  5.  Home medications reviewed.    The above documentation resulted from a face-to-face encounter by me Yokasta HAN, [facility].    Discharge Planning: I expect patient to be discharged per Dr. Sanders.    GUILLE Nelson   05/14/19   11:40 AM    Patient has already been discharged by Ortho-Spine service and I was unable to personally evaluate patient this afternoon.  I agree with the assessment, treatment plan, and disposition of the patient as recorded by Yokasta Dolan.         Shan Fernandez MD  05/14/19  3:06 PM

## 2019-05-14 NOTE — PLAN OF CARE
Problem: Patient Care Overview  Goal: Plan of Care Review  Outcome: Ongoing (interventions implemented as appropriate)   05/14/19 1114   Coping/Psychosocial   Plan of Care Reviewed With patient   Plan of Care Review   Progress improving   OTHER   Outcome Summary OT evaluation completed. Pt demo no further need for skilled OT services as pt is independent with functional mobility and ADLs. OT will sign off with recommendations for pt to discharge home with assist.

## 2019-05-14 NOTE — CONSULTS
[facility] Medicine Consult Note    Date of Admission: 5/13/2019  Date of Consult: 05/13/19    Primary Care Physician: Katiuska Macias APRN  Referring Physician: Lucas Boland MD    Chief complaint/Reason for consultation: Diabetes management    History of Present Illness  Marguerite Rob is a 59-year-old female with a past medical history of diabetes mellitus type 2, congestive heart failure, chronic hepatitis C, alcoholic fatty liver disease, COPD with ongoing tobacco use, gastroesophageal reflux disease, please see below for complete list.  Patient was admitted for planned surgery of cervical stenosis per Dr. Clark Sanders. Hospitalist have been consulted to manage hyperglycemia.  Patient currently states her pain is well controlled.  She denies chest pain, shortness of breathing, or abdominal pain.  She does report tendency for constipation therefore Dulcolax suppository has been added as needed.  She also reports reflux, normally treated with both Pepcid and Protonix.  We will restart.  We will follow along.    Review of Systems   A 10 point review of systems was completed, all negative except for those discussed in HPI    Past Medical History:   Past Medical History:   Diagnosis Date   • Alcoholic fatty liver    • Alkaline phosphatase raised    • Anxiety    • Asthma     IgE-MEDIATED ALLERGIC ASTHMA   • Backache     CHRONIC   • CHF (congestive heart failure) (CMS/HCC)    • Chronic hepatitis C (CMS/HCC)     2b. Fibrosure .05/F0, necroinflam .14/A0. Repeat .05/F0, .13/A0      • Chronic neck pain    • Constipation    • COPD (chronic obstructive pulmonary disease) (CMS/HCC)    • Diabetes (CMS/HCC)    • Diarrhea    • Elevated levels of transaminase & lactic acid dehydrogenase    • Emphysema, unspecified (CMS/HCC)    • Generalized abdominal pain    • GERD (gastroesophageal reflux disease)     WITH ESOPHAGITIS   • Hepatitis    • High risk sexual behavior    • Hypertension     • Hypokalemia    • Irritable bowel syndrome (IBS)    • Multiple joint pain    • Need for vaccination    • Numbness and tingling in left arm    • On long term drug therapy    • Shoulder pain    • Tobacco dependence syndrome        Past Surgical History:   Past Surgical History:   Procedure Laterality Date   • APPENDECTOMY     • CERVICAL FUSION  1999    C5-6   • CHOLECYSTECTOMY     • COLONOSCOPY  02/08/2016   • COLONOSCOPY W/ POLYPECTOMY  02/08/2016    External and internal hemorrhoids.The examination was otherwise normal.Fluid aspiration performed.Several biopsies obtained in the entire colon.   • ENDOSCOPY  02/08/2016    Mildly severe esophagitis.Gastritis.Normal examined duodenum.Several biopsies obtained in the lower third of the esophagus.Several biopsies obtained in the gastric antrum.Several biopsies obtained in the first part of the duodenum.   • ENDOSCOPY  08/13/2012    Esophagitis seen. Biopsy taken. Gastritis in stomach. Biopsy taken. Normal duodenum. Biopsy taken.   • ENDOSCOPY AND COLONOSCOPY  08/13/2012    Internal & external hemorrhoids found. Scope could not pass into the TI.   • FINGER SURGERY Left 04/2019    thumb - piece of metal removed   • HEMORRHOIDECTOMY     • HERNIA REPAIR      pt states no hernia was found so no procedure was done so they closed her back up   • INJECTION OF MEDICATION  08/01/2013    METHYLPREDNISONE, X2   • INJECTION OF MEDICATION  11/03/2011    KENALOG   • INJECTION OF MEDICATION  02/24/2011    ROCEPHIN   • KNEE SURGERY Right    • TONSILLECTOMY AND ADENOIDECTOMY     • TOTAL ABDOMINAL HYSTERECTOMY WITH SALPINGO OOPHORECTOMY     • UPPER GASTROINTESTINAL ENDOSCOPY  02/08/2016       Code Status: Full, if unable speak for herself her son or daughter will speak for her    Family History: family history includes Breast cancer in her sister; Cancer in her father and other; Coronary artery disease in her mother; Diabetes in her brother, father, and mother; Endometrial cancer in her  other; Heart failure in her father and mother; Ovarian cancer in her other; Thyroid disease in her father.    Social History:  reports that she has been smoking cigarettes.  She has a 11.25 pack-year smoking history. She has never used smokeless tobacco. She reports that she does not drink alcohol or use drugs.    Allergies:   Allergies   Allergen Reactions   • Doxycycline Itching   • Penicillins Rash       Home Medications:   Prior to Admission medications    Medication Sig Start Date End Date Taking? Authorizing Provider   amitriptyline (ELAVIL) 100 MG tablet Take 200 mg by mouth Every Night.   Yes Modesta Chaudhary MD   amLODIPine (NORVASC) 5 MG tablet Take 5 mg by mouth Daily.   Yes Modesta Chaudhary MD   cetirizine (zyrTEC) 10 MG tablet Take 10 mg by mouth Daily.   Yes Modesta Chaudhary MD   famotidine (PEPCID) 20 MG tablet Take 20 mg by mouth Daily.   Yes Modesta Chaudhary MD   fluconazole (DIFLUCAN) 100 MG tablet Take 100 mg by mouth 1 (One) Time Per Week.   Yes Modesta Chaudhary MD   fluticasone (FLONASE) 50 MCG/ACT nasal spray 1 spray into each nostril Daily. 2/13/17  Yes Modesta Chaudhary MD   Fluticasone Furoate-Vilanterol (BREO ELLIPTA) 100-25 MCG/INH inhaler Inhale 1 puff Daily. 3/27/19  Yes Denisse Marcano MD   furosemide (LASIX) 20 MG tablet Take 20 mg by mouth Daily.   Yes Modesta Chaudhary MD   gemfibrozil (LOPID) 600 MG tablet Take 600 mg by mouth 2 (Two) Times a Day Before Meals.   Yes Modesta Chaudhary MD   glyBURIDE (DIAbeta) 2.5 MG tablet Take 2.5 mg by mouth 2 (Two) Times a Day With Meals.   Yes Modesta Chaudhary MD   HYDROcodone-acetaminophen (NORCO) 7.5-325 MG per tablet Take 1 tablet by mouth 3 (Three) Times a Day.   Yes Modesta Chaudhary MD   JANUMET XR  MG tablet sustained-release 24 hour Take 1 tablet by mouth 2 (Two) Times a Day. 2/2/17  Yes Modesta Chaudhary MD   levoFLOXacin (LEVAQUIN) 250 MG tablet Take 250 mg by mouth Daily.   Yes  ProviderModesta MD   metoclopramide (REGLAN) 10 MG tablet Take 10 mg by mouth As Needed (nausea).   Yes Modesta Chaudhary MD   montelukast (SINGULAIR) 10 MG tablet Take 1 tablet by mouth Every Night. 2/23/17  Yes Denisse Marcano MD   pantoprazole (PROTONIX) 40 MG EC tablet Take 40 mg by mouth Daily.   Yes Modesta Chaudhary MD   potassium chloride (K-DUR,KLOR-CON) 10 MEQ CR tablet Take 10 mEq by mouth 2 (Two) Times a Day.   Yes Modesta Chaudhary MD   pravastatin (PRAVACHOL) 20 MG tablet Take 20 mg by mouth Every Night.   Yes Modesta Chaudhary MD   sennosides-docusate sodium (SENOKOT-S) 8.6-50 MG tablet Take 2 tablets by mouth Every Night.   Yes Modesta Chaudhary MD   sulfamethoxazole-trimethoprim (BACTRIM DS,SEPTRA DS) 800-160 MG per tablet Take 1 tablet by mouth 2 (Two) Times a Day.   Yes Modesta Chaudhary MD       Scheduled Medications    amitriptyline 200 mg Oral Nightly   amLODIPine 5 mg Oral Daily   [START ON 5/14/2019] bisacodyl 10 mg Rectal Daily   budesonide-formoterol 2 puff Inhalation BID - RT   ceFAZolin 1 g Intravenous Q8H   famotidine 20 mg Intravenous Q12H   Or      famotidine 20 mg Oral Q12H   fluticasone 1 spray Nasal Daily   furosemide 20 mg Oral Daily   [START ON 5/14/2019] glipiZIDE 2.5 mg Oral QAM AC   insulin lispro 0-24 Units Subcutaneous 4x Daily With Meals & Nightly   [START ON 5/14/2019] levoFLOXacin 250 mg Oral Daily   linagliptin-metFORMIN ER (JENTADUETO XR) 5-500 mg combo dose  Oral BID With Meals   montelukast 10 mg Oral Nightly   pantoprazole 40 mg Oral Q AM   potassium chloride 10 mEq Oral BID With Meals   rosuvastatin 5 mg Oral Nightly   sennosides-docusate sodium 2 tablet Oral Nightly   sodium chloride 3 mL Intravenous Q12H   sulfamethoxazole-trimethoprim 1 tablet Oral Q12H       Pertinent Data:   Lab Results (last 72 hours)     Procedure Component Value Units Date/Time    POC Glucose Once [[id number]]  (Abnormal) Collected:  05/13/19 8973    Specimen:   Blood Updated:  05/13/19 2225     Glucose 381 mg/dL      Comment: : [id number] Justyna LisaMeter ID: [id number]       POC Glucose Once [[id number]]  (Abnormal) Collected:  05/13/19 2034    Specimen:  Blood Updated:  05/13/19 2056     Glucose 465 mg/dL      Comment: : [id number] Ray (Darnall) KatelynMeter ID: [id number]       POC Glucose Once [[id number]]  (Abnormal) Collected:  05/13/19 2033    Specimen:  Blood Updated:  05/13/19 2055     Glucose 430 mg/dL      Comment: : [id number] Ray (Darnall) KatelynMeter ID: [id number]       POC Glucose Once [[id number]]  (Abnormal) Collected:  05/13/19 1648    Specimen:  Blood Updated:  05/13/19 1659     Glucose 310 mg/dL      Comment: : [id number] Tavarez NicoleMeter ID: [id number]       POC Glucose Once [[id number]]  (Abnormal) Collected:  05/13/19 1449    Specimen:  Blood Updated:  05/13/19 1503     Glucose 226 mg/dL      Comment: : [id number] Sandoval KathyMeter ID: [id number]       POC Glucose Once [[id number]]  (Abnormal) Collected:  05/13/19 0821    Specimen:  Blood Updated:  05/13/19 0832     Glucose 277 mg/dL      Comment: : [id number] Ramses LaurenMeter ID: [id number]           Imaging Results (last 24 hours)     Procedure Component Value Units Date/Time    XR Spine Cervical 2 View [[id number]] Collected:  05/13/19 1426     Updated:  05/13/19 1652    Narrative:       INTRAOPERATIVE FLUOROSCOPIC GUIDANCE 05/13/2019      INDICATION: Intraoperative fluoroscopic guidance. Removal of  instrumentation.      TECHNIQUE: 4 fluoroscopic images from the operating room were submitted  for evaluation. Please note, no radiologist was in attendance for  acquisition of these images. These images are available for future  reference to the attending surgeon. Total fluoroscopic time was 5  seconds.      FINDINGS: Intraoperative images related to removal of cervical fusion  hardware.        Impression:       1. Intraoperative fluoroscopic guidance as described.   2.  Please refer to real-time fluoroscopy and operative report for full  details.  This report was finalized on 05/13/2019 14:30 by Dr. Loulou Palacios MD.    FL C Arm During Surgery [[id number]] Updated:  05/13/19 1652            Temp:  [97 °F (36.1 °C)-97.9 °F (36.6 °C)] 97.9 °F (36.6 °C)  Heart Rate:  [102-116] 112  Resp:  [12-24] 20  BP: (111-154)/(46-96) 136/77    Physical Exam   Constitutional: She is oriented to person, place, and time. She appears well-developed and well-nourished. No distress.   HENT:   Head: Normocephalic and atraumatic.   Eyes: Conjunctivae and EOM are normal. Pupils are equal, round, and reactive to light. No scleral icterus.   Neck: Normal range of motion. Neck supple. No JVD present. No tracheal deviation present.   Cardiovascular: Normal rate, regular rhythm, normal heart sounds and intact distal pulses. Exam reveals no gallop.   No murmur heard.  Pulmonary/Chest: Effort normal and breath sounds normal. No respiratory distress. She has no wheezes. She has no rales.   Abdominal: Soft. Bowel sounds are normal. She exhibits no distension. There is no tenderness. There is no guarding.   Musculoskeletal: Normal range of motion. She exhibits no edema.   Cervical collar in use   Neurological: She is alert and oriented to person, place, and time.   Skin: Skin is warm and dry. No rash noted. She is not diaphoretic. No erythema. No pallor.   Psychiatric: She has a normal mood and affect. Her behavior is normal.   Vitals reviewed.      Assessment:     Active Hospital Problems    Diagnosis   • **DM (diabetes mellitus) (CMS/HCC)   • Stenosis, cervical spine   • Benign essential HTN   • TORRES (nonalcoholic steatohepatitis)   • COPD with asthma (CMS/HCC)   • Cigarette nicotine dependence, uncomplicated       Plan:   1.  Monitor glucose before meals and at bedtime with regular insulin sliding scale coverage  2.  Medications reviewed restarted per attending, resumed Protonix  3.  Add as needed Dulcolax  suppository daily for constipation  4.  Labs in a.m. BMP and CBC with differential  5.  DVT prophylaxis per attending    I discussed the patients findings and my recommendations with: Alexandru Pickett DO  Time spent: 35 minutes    Agree with plan, discussed with NP.     Michaela Fernandez, GUILLE  05/13/19  11:32 PM

## 2019-05-14 NOTE — PLAN OF CARE
Problem: Patient Care Overview  Goal: Plan of Care Review  Outcome: Ongoing (interventions implemented as appropriate)   05/13/19 1758   Coping/Psychosocial   Plan of Care Reviewed With patient   Plan of Care Review   Progress no change   OTHER   Outcome Summary Pt a& o x4. s/p ACDF, aspen collar in place. Dressing CDI. Swallowing without difficulty. VSS. Voiding on own. Will cont to monitor.        Problem: Laminectomy/Foraminotomy/Discectomy (Adult)  Goal: Signs and Symptoms of Listed Potential Problems Will be Absent, Minimized or Managed (Laminectomy/Foraminotomy/Discectomy)  Outcome: Ongoing (interventions implemented as appropriate)    Goal: Anesthesia/Sedation Recovery  Outcome: Ongoing (interventions implemented as appropriate)

## 2019-05-14 NOTE — PAYOR COMM NOTE
"[id number]  ADMIT INPT 5-13-19  UR PHONE    [phone]  PLEASE CALL WITH INPT APPROVAL AND AUTH, THANKS      Marguerite Rob (59 y.o. Female)     Date of Birth Social Security Number Address Home Phone MRN    [id number]  [id number] [facility] [address] [phone] [id number]    Adventism Marital Status          Jewish        Admission Date Admission Type Admitting Provider Attending Provider Department, Room/Bed    5/13/19 Elective YINKA Sanders MD Strenge, K Brandon, MD [facility] 3A, 347/1    Discharge Date Discharge Disposition Discharge Destination                       Attending Provider:  YINKA Sanders MD    Allergies:  Doxycycline, Penicillins    Isolation:  None   Infection:  None   Code Status:  CPR    Ht:  160 cm (62.99\")   Wt:  69.7 kg (153 lb 10.6 oz)    Admission Cmt:  None   Principal Problem:  DM (diabetes mellitus) (CMS/[facility]) [E11.9]                 Active Insurance as of 5/13/2019     Primary Coverage     Payor Plan Insurance Group Employer/Plan Group    [place]      Payor Plan Address Payor Plan Phone Number Payor Plan Fax Number Effective Dates    [address] [address]   4/1/2014 - None Entered    [place] [address]       Subscriber Name Subscriber Birth Date Member ID       MARGUERITE ROB 1960 [id number]                 Emergency Contacts      (Rel.) Home Phone Work Phone Mobile Phone    Teddy Webber (Son) [phone] -- [phone]    SebastianShantel bush (Daughter) -- -- [phone]    LEDA ROB (Daughter) -- -- [phone]               History & Physical      YINKA Sanders MD at 5/13/2019  9:20 AM          H&P reviewed. The patient was examined and there are no changes to the H&P.          Electronically signed by YINKA Sanders MD at 5/13/2019  9:20 AM   Source Note         Scan on 5/13/2019: [facility] - 05/10/2019 (below)            Electronically signed by " "Interface, Scans Incoming at 5/10/2019 11:56 AM             [facility] 5/10/2019 12:04 PM     Scan on 5/13/2019: HISTORY /ORTHOPAEDIC/YVONNE LERMA/5/10/19 (below)            Electronically signed by Interface, Scans Incoming at 5/10/2019 12:04 PM     [facility] at 5/10/2019 11:56 AM     Scan on 5/13/2019: [facility] - 05/10/2019 (below)            Electronically signed by Interface, Scans Incoming at 5/10/2019 11:56 AM       ICU Vital Signs     Row Name 05/14/19 0546 05/13/19 2342 05/13/19 1836 05/13/19 1725 05/13/19 1655       Height and Weight    Height  --  --  --  --  160 cm (62.99\")    Weight  --  --  --  --  69.7 kg (153 lb 10.6 oz)    Ideal Body Weight (IBW) (kg)  --  --  --  --  52.7    BSA (Calculated - sq m)  --  --  --  --  1.73 sq meters    BMI (Calculated)  --  --  --  --  27.2    Weight in (lb) to have BMI = 25  --  --  --  --  140.8       Vitals    Temp  98.5 °F (36.9 °C)  98.1 °F (36.7 °C)  --  97.9 °F (36.6 °C)  97.9 °F (36.6 °C)    Temp src  Oral  Oral  --  Oral  Oral    Pulse  108  108  112  110  110    Heart Rate Source  Monitor  Monitor  Monitor  Monitor  Monitor    Resp  18  20  20  20  24    Resp Rate Source  Visual  Visual  Visual  Visual  Visual    BP  139/67  148/63  --  136/77  132/76    Noninvasive MAP (mmHg)  --  83  --  --  --    BP Location  Right arm  Right arm  --  Right arm  Left arm    BP Method  Automatic  Automatic  --  Automatic  Automatic    Patient Position  Lying  Lying  --  Lying  Lying       Oxygen Therapy    SpO2  95 %  94 %  95 %  97 %  97 %    Pulse Oximetry Type  Continuous  Continuous  Continuous  Continuous  Continuous    Device (Oxygen Therapy)  room air  room air  room air  nasal cannula  nasal cannula    Flow (L/min)  --  --  --  4  4    Row Name 05/13/19 1624 05/13/19 1610 05/13/19 1600 05/13/19 1545 05/13/19 1543       Vitals    Temp  97.5 °F (36.4 °C)  97.5 °F (36.4 °C)  --  --  --    Temp src  Oral  Temporal  --  --  --    " Pulse  112  110  113  107  108    Heart Rate Source  Monitor  --  --  --  --    Resp  16 13 17 12  12    Resp Rate Source  Visual  --  --  --  --    BP  121/72  136/69  132/66  133/56  133/56    Noninvasive MAP (mmHg)  --  82  81  73  73    BP Location  Right arm  --  --  --  --    BP Method  Automatic  --  --  --  --    Patient Position  Lying  --  --  --  --       Oxygen Therapy    SpO2  97 %  94 %  96 %  94 %  90 % O2 Sat 90% encouraged to take deep breaths    Pulse Oximetry Type  Continuous  Continuous  Continuous  --  Continuous    Device (Oxygen Therapy)  nasal cannula  nasal cannula  nasal cannula  nasal cannula  nasal cannula    Flow (L/min)  3  3  3  4  4    Row Name 05/13/19 1538 05/13/19 1530 05/13/19 1515 05/13/19 1500 05/13/19 1455       Vitals    Pulse  105  110  112  116  114    Resp  12 14 13 19  18    BP  --  138/64  132/71  111/96  154/63    Noninvasive MAP (mmHg)  --  84  86  99  81       Oxygen Therapy    SpO2  93 %  95 %  95 %  95 %  98 %    Pulse Oximetry Type  Continuous  Continuous  Continuous  Continuous  Continuous    Device (Oxygen Therapy)  nasal cannula  nasal cannula  nasal cannula  nasal cannula  nasal cannula    Flow (L/min)  3  3  3  3  3    Row Name 05/13/19 1450 05/13/19 1445 05/13/19 1443 05/13/19 0927 05/13/19 0746       Vitals    Temp  --  --  97.5 °F (36.4 °C)  --  97 °F (36.1 °C)    Temp src  --  --  Temporal  --  Temporal    Pulse  115  113  110  103  102    Heart Rate Source  --  --  Monitor  Monitor  Monitor    Resp  19 19 18 18  18    Resp Rate Source  --  --  Monitor  Visual  Visual    BP  150/64  133/79  133/79  --  137/46    Noninvasive MAP (mmHg)  83  93  93  --  --    BP Location  --  --  Right arm  --  --    BP Method  --  --  Automatic  --  Automatic    Patient Position  --  --  Lying  --  Sitting       Oxygen Therapy    SpO2  98 %  99 %  96 %  96 %  99 %    Pulse Oximetry Type  Continuous  Continuous  Continuous  Continuous  Intermittent    Device (Oxygen  Therapy)  simple face mask  simple face mask  simple face mask  room air  room air    Flow (L/min)  8  8  8  --  --        Hospital Medications (all)       Dose Frequency Start End    acetaminophen (TYLENOL) tablet 1,000 mg 1,000 mg Once 5/13/2019 5/13/2019    Sig - Route: Take 2 tablets by mouth 1 (One) Time. - Oral    amitriptyline (ELAVIL) tablet 200 mg 200 mg Nightly 5/13/2019     Sig - Route: Take 2 tablets by mouth Every Night. - Oral    amLODIPine (NORVASC) tablet 5 mg 5 mg Daily 5/13/2019     Sig - Route: Take 1 tablet by mouth Daily. - Oral    bisacodyl (DULCOLAX) suppository 10 mg 10 mg Daily PRN 5/14/2019     Sig - Route: Insert 1 suppository into the rectum Daily As Needed for Constipation. - Rectal    budesonide-formoterol (SYMBICORT) 80-4.5 MCG/ACT inhaler 2 puff 2 puff 2 Times Daily - RT 5/13/2019     Sig - Route: Inhale 2 puffs 2 (Two) Times a Day. - Inhalation    ceFAZolin (ANCEF) 1 g/100 mL 0.9% NS IVPB (mbp) 1 g Once 5/13/2019 5/13/2019    Sig - Route: Infuse 100 mL into a venous catheter 1 (One) Time. - Intravenous    ceFAZolin (ANCEF) 1 g/100 mL 0.9% NS IVPB (mbp) 1 g Every 8 Hours 5/13/2019 5/14/2019    Sig - Route: Infuse 100 mL into a venous catheter Every 8 (Eight) Hours. - Intravenous    dextrose (D50W) 25 g/ 50mL Intravenous Solution 25 g 25 g Every 15 Minutes PRN 5/13/2019     Sig - Route: Infuse 50 mL into a venous catheter Every 15 (Fifteen) Minutes As Needed for Low Blood Sugar (Blood Sugar Less Than 70). - Intravenous    dextrose (GLUTOSE) oral gel 15 g 15 g Every 15 Minutes PRN 5/13/2019     Sig - Route: Take 15 application by mouth Every 15 (Fifteen) Minutes As Needed for Low Blood Sugar (Blood sugar less than 70). - Oral    diphenhydrAMINE (BENADRYL) capsule 25 mg 25 mg Nightly PRN 5/13/2019     Sig - Route: Take 1 capsule by mouth At Night As Needed for Sleep. - Oral    famotidine (PEPCID) injection 20 mg 20 mg Every 12 Hours Scheduled 5/13/2019     Sig - Route: Infuse 2 mL into a  "venous catheter Every 12 (Twelve) Hours. - Intravenous    Linked Group 1:  \"Or\" Linked Group Details        famotidine (PEPCID) tablet 20 mg 20 mg Every 12 Hours Scheduled 5/13/2019     Sig - Route: Take 1 tablet by mouth Every 12 (Twelve) Hours. - Oral    Linked Group 1:  \"Or\" Linked Group Details        fentaNYL citrate (PF) (SUBLIMAZE) injection 25 mcg 25 mcg As Needed 5/13/2019 5/13/2019    Sig - Route: Infuse 0.5 mL into a venous catheter As Needed for Moderate Pain  or Severe Pain  (Use for breakthrough pain). - Intravenous    fluticasone (FLONASE) 50 MCG/ACT nasal spray 1 spray 1 spray Daily 5/13/2019     Sig - Route: 1 spray into the nostril(s) as directed by provider Daily. - Nasal    furosemide (LASIX) tablet 20 mg 20 mg Daily 5/13/2019     Sig - Route: Take 1 tablet by mouth Daily. - Oral    glipiZIDE (GLUCOTROL) tablet 2.5 mg 2.5 mg Every Morning Before Breakfast 5/14/2019     Sig - Route: Take 0.5 tablets by mouth Every Morning Before Breakfast. - Oral    glucagon (human recombinant) (GLUCAGEN DIAGNOSTIC) injection 1 mg 1 mg As Needed 5/13/2019     Sig - Route: Inject 1 mg under the skin into the appropriate area as directed As Needed (Blood Glucose Less Than 70). - Subcutaneous    HYDROmorphone (DILAUDID) injection solution 1 mg 1 mg Every 3 Hours PRN 5/13/2019 5/23/2019    Sig - Route: Infuse 1 mL into a venous catheter Every 3 (Three) Hours As Needed for Severe Pain . - Intravenous    insulin lispro (humaLOG) injection 0-24 Units 0-24 Units 4 Times Daily With Meals & Nightly 5/13/2019     Sig - Route: Inject 0-24 Units under the skin into the appropriate area as directed 4 (Four) Times a Day With Meals & at Bedtime. - Subcutaneous    levoFLOXacin (LEVAQUIN) tablet 250 mg 250 mg Daily 5/14/2019 5/17/2019    Sig - Route: Take 1 tablet by mouth Daily. - Oral    linagliptin (TRADJENTA) 5 mg, metFORMIN ER (GLUCOPHAGE-XR) 500 mg for JENTADUETO XR 5-500  2 Times Daily With Meals 5/13/2019     Sig - Route: " "Take  by mouth 2 (Two) Times a Day With Meals. - Oral    metoclopramide (REGLAN) injection 10 mg 10 mg Once As Needed 5/13/2019 5/13/2019    Sig - Route: Infuse 2 mL into a venous catheter 1 (One) Time As Needed for Nausea or Vomiting. - Intravenous    metoclopramide (REGLAN) tablet 10 mg 10 mg As Needed 5/13/2019     Sig - Route: Take 1 tablet by mouth As Needed (nausea). - Oral    montelukast (SINGULAIR) tablet 10 mg 10 mg Nightly 5/13/2019     Sig - Route: Take 1 tablet by mouth Every Night. - Oral    ondansetron (ZOFRAN) injection 4 mg 4 mg Every 6 Hours PRN 5/13/2019     Sig - Route: Infuse 2 mL into a venous catheter Every 6 (Six) Hours As Needed for Nausea or Vomiting. - Intravenous    Linked Group 2:  \"Or\" Linked Group Details        ondansetron (ZOFRAN) tablet 4 mg 4 mg Every 6 Hours PRN 5/13/2019     Sig - Route: Take 1 tablet by mouth Every 6 (Six) Hours As Needed for Nausea or Vomiting. - Oral    Linked Group 2:  \"Or\" Linked Group Details        oxyCODONE ER (oxyCONTIN) 12 hr tablet 20 mg 20 mg Once 5/13/2019 5/13/2019    Sig - Route: Take 1 tablet by mouth 1 (One) Time. - Oral    oxyCODONE-acetaminophen (PERCOCET)  MG per tablet 1 tablet 1 tablet Once As Needed 5/13/2019 5/13/2019    Sig - Route: Take 1 tablet by mouth 1 (One) Time As Needed for Moderate Pain . - Oral    oxyCODONE-acetaminophen (PERCOCET)  MG per tablet 2 tablet 2 tablet Every 4 Hours PRN 5/13/2019 5/23/2019    Sig - Route: Take 2 tablets by mouth Every 4 (Four) Hours As Needed for Severe Pain . - Oral    pantoprazole (PROTONIX) EC tablet 40 mg 40 mg Every Early Morning 5/13/2019     Sig - Route: Take 1 tablet by mouth Every Morning. - Oral    potassium chloride (MICRO-K) CR capsule 10 mEq 10 mEq 2 Times Daily With Meals 5/13/2019     Sig - Route: Take 1 capsule by mouth 2 (Two) Times a Day With Meals. - Oral    rosuvastatin (CRESTOR) tablet 5 mg 5 mg Nightly 5/13/2019     Sig - Route: Take 0.5 tablets by mouth Every Night. " - Oral    sennosides-docusate sodium (SENOKOT-S) 8.6-50 MG tablet 2 tablet 2 tablet Nightly 5/13/2019     Sig - Route: Take 2 tablets by mouth Every Night. - Oral    sodium chloride 0.9 % flush 3 mL 3 mL Every 12 Hours Scheduled 5/13/2019     Sig - Route: Infuse 3 mL into a venous catheter Every 12 (Twelve) Hours. - Intravenous    sodium chloride 0.9 % flush 3-10 mL 3-10 mL As Needed 5/13/2019     Sig - Route: Infuse 3-10 mL into a venous catheter As Needed for Line Care. - Intravenous    sodium chloride 0.9 % infusion 75 mL/hr Continuous 5/13/2019 5/15/2019    Sig - Route: Infuse 75 mL/hr into a venous catheter Continuous. - Intravenous    sodium chloride 0.9 % infusion 75 mL/hr Continuous 5/13/2019 5/14/2019    Sig - Route: Infuse 75 mL/hr into a venous catheter Continuous. - Intravenous    sulfamethoxazole-trimethoprim (BACTRIM DS,SEPTRA DS) 800-160 MG per tablet 160 mg 1 tablet Every 12 Hours Scheduled 5/13/2019 5/15/2019    Sig - Route: Take 1 tablet by mouth Every 12 (Twelve) Hours. - Oral    tiZANidine (ZANAFLEX) tablet 4 mg 4 mg Every 8 Hours PRN 5/13/2019     Sig - Route: Take 1 tablet by mouth Every 8 (Eight) Hours As Needed for Muscle Spasms. - Oral    atropine sulfate injection 0.5 mg (Discontinued) 0.5 mg Once As Needed 5/13/2019 5/13/2019    Sig - Route: Infuse 5 mL into a venous catheter 1 (One) Time As Needed for Bradycardia (symptomatic bradycardia (hypotension, dizziness, confusion). Notify attending anesthesiologist.). - Intravenous    Reason for Discontinue: Patient Transfer    bisacodyl (DULCOLAX) suppository 10 mg (Discontinued) 10 mg Daily 5/14/2019 5/14/2019    Sig - Route: Insert 1 suppository into the rectum Daily. - Rectal    buffered lidocaine syringe 0.5 mL (Discontinued) 0.5 mL Once As Needed 5/13/2019 5/13/2019    Sig - Route: Inject 0.5 mL as directed 1 (One) Time As Needed (IV Start). - Injection    Reason for Discontinue: Patient Transfer    buffered lidocaine syringe 0.5 mL  (Discontinued) 0.5 mL Once As Needed 5/13/2019 5/13/2019    Sig - Route: Inject 0.5 mL as directed 1 (One) Time As Needed (IV Start). - Injection    flumazenil (ROMAZICON) injection 0.2 mg (Discontinued) 0.2 mg As Needed 5/13/2019 5/13/2019    Sig - Route: Infuse 2 mL into a venous catheter As Needed (unresponsiveness). - Intravenous    Reason for Discontinue: Patient Transfer    gelatin absorbable (GELFOAM) powder (Discontinued)  As Needed 5/13/2019 5/13/2019    Sig: As Needed.    Reason for Discontinue: Patient Discharge    hydrALAZINE (APRESOLINE) injection 5 mg (Discontinued) 5 mg Every 10 Minutes PRN 5/13/2019 5/13/2019    Sig - Route: Infuse 0.25 mL into a venous catheter Every 10 (Ten) Minutes As Needed for High Blood Pressure (for systolic blood pressure greater than 180 mmHg or diastolic blood pressure greater than 105 mmHg when HR less than 60). - Intravenous    Reason for Discontinue: Patient Transfer    insulin lispro (humaLOG) injection 2-9 Units (Discontinued) 2-9 Units 4 Times Daily With Meals & Nightly 5/13/2019 5/13/2019    Sig - Route: Inject 2-9 Units under the skin into the appropriate area as directed 4 (Four) Times a Day With Meals & at Bedtime. - Subcutaneous    ipratropium-albuterol (DUO-NEB) nebulizer solution 3 mL (Discontinued) 3 mL Once As Needed 5/13/2019 5/13/2019    Sig - Route: Take 3 mL by nebulization 1 (One) Time As Needed for Wheezing or Shortness of Air (bronchospasm). - Nebulization    Reason for Discontinue: Patient Transfer    labetalol (NORMODYNE,TRANDATE) injection 5 mg (Discontinued) 5 mg Every 5 Minutes PRN 5/13/2019 5/13/2019    Sig - Route: Infuse 1 mL into a venous catheter Every 5 (Five) Minutes As Needed for High Blood Pressure (for systolic blood pressure greater than 180 mmHg or diastolic blood pressure greater than 105 mmHg). - Intravenous    Reason for Discontinue: Patient Transfer    lactated ringers infusion 1,000 mL (Discontinued) 1,000 mL Continuous 5/13/2019  "5/13/2019    Sig - Route: Infuse 1,000 mL into a venous catheter Continuous. - Intravenous    Cosign for Ordering: Required by YINKA Sanders MD    lactated ringers infusion (Discontinued) 100 mL/hr Continuous PRN 5/13/2019 5/13/2019    Sig - Route: Infuse 100 mL/hr into a venous catheter Continuous As Needed (Start Prior to Surgery). - Intravenous    lactated ringers infusion (Discontinued) 100 mL/hr Continuous 5/13/2019 5/13/2019    Sig - Route: Infuse 100 mL/hr into a venous catheter Continuous. - Intravenous    meperidine (DEMEROL) injection 12.5 mg (Discontinued) 12.5 mg Every 5 Minutes PRN 5/13/2019 5/13/2019    Sig - Route: Infuse 0.5 mL into a venous catheter Every 5 (Five) Minutes As Needed for Shivering (May repeat x 3). - Intravenous    Reason for Discontinue: Patient Transfer    metoclopramide (REGLAN) injection 5 mg (Discontinued) 5 mg Every 15 Minutes PRN 5/13/2019 5/13/2019    Sig - Route: Infuse 1 mL into a venous catheter Every 15 (Fifteen) Minutes As Needed for Nausea or Vomiting (for nausea/vomiting). - Intravenous    Reason for Discontinue: Patient Transfer    midazolam (VERSED) injection 1 mg (Discontinued) 1 mg Every 5 Minutes PRN 5/13/2019 5/13/2019    Sig - Route: Infuse 1 mL into a venous catheter Every 5 (Five) Minutes As Needed for Anxiety (Max 4mg midazolam TOTAL). - Intravenous    Reason for Discontinue: Patient Transfer    Linked Group 3:  \"Or\" Linked Group Details        midazolam (VERSED) injection 2 mg (Discontinued) 2 mg Every 5 Minutes PRN 5/13/2019 5/13/2019    Sig - Route: Infuse 2 mL into a venous catheter Every 5 (Five) Minutes As Needed for Anxiety (Max 4mg midazolam TOTAL). - Intravenous    Reason for Discontinue: Patient Transfer    Linked Group 3:  \"Or\" Linked Group Details        Morphine sulfate (PF) injection 2 mg (Discontinued) 2 mg Every 10 Minutes PRN 5/13/2019 5/13/2019    Sig - Route: Infuse 1 mL into a venous catheter Every 10 (Ten) Minutes As Needed for " Moderate Pain  or Severe Pain . - Intravenous    Reason for Discontinue: Patient Transfer    naloxone (NARCAN) injection 0.04 mg (Discontinued) 0.04 mg As Needed 5/13/2019 5/13/2019    Sig - Route: Infuse 0.1 mL into a venous catheter As Needed for Opioid Reversal (unresponsiveness, decrease oxygen saturation). - Intravenous    Reason for Discontinue: Patient Transfer    ondansetron (ZOFRAN) injection 4 mg (Discontinued) 4 mg As Needed 5/13/2019 5/13/2019    Sig - Route: Infuse 2 mL into a venous catheter As Needed for Nausea or Vomiting. - Intravenous    Reason for Discontinue: Patient Transfer    ondansetron (ZOFRAN) injection 4 mg (Discontinued) 4 mg Every 6 Hours PRN 5/13/2019 5/13/2019    Sig - Route: Infuse 2 mL into a venous catheter Every 6 (Six) Hours As Needed for Nausea or Vomiting. - Intravenous    Reason for Discontinue: Duplicate order    pravastatin (PRAVACHOL) tablet 20 mg (Discontinued) 20 mg Nightly 5/13/2019 5/13/2019    Sig - Route: Take 1 tablet by mouth Every Night. - Oral    sodium chloride (NS) irrigation solution (Discontinued)  As Needed 5/13/2019 5/13/2019    Sig: As Needed.    Reason for Discontinue: Patient Discharge    sodium chloride 0.9 % flush 1-10 mL (Discontinued) 1-10 mL As Needed 5/13/2019 5/13/2019    Sig - Route: Infuse 1-10 mL into a venous catheter As Needed for Line Care. - Intravenous    Reason for Discontinue: Patient Transfer    sodium chloride 0.9 % flush 1-10 mL (Discontinued) 1-10 mL As Needed 5/13/2019 5/13/2019    Sig - Route: Infuse 1-10 mL into a venous catheter As Needed for Line Care. - Intravenous    Reason for Discontinue: Patient Transfer    sodium chloride 0.9 % flush 3 mL (Discontinued) 3 mL Every 12 Hours Scheduled 5/13/2019 5/13/2019    Sig - Route: Infuse 3 mL into a venous catheter Every 12 (Twelve) Hours. - Intravenous    Reason for Discontinue: Patient Transfer    sodium chloride 0.9 % flush 3 mL (Discontinued) 3 mL As Needed 5/13/2019 5/13/2019    Sig  - Route: Infuse 3 mL into a venous catheter As Needed for Line Care. - Intravenous    Reason for Discontinue: Patient Transfer    Cosign for Ordering: Required by YINKA Sanders MD    sodium chloride 0.9 % flush 3 mL (Discontinued) 3 mL Every 12 Hours Scheduled 5/13/2019 5/13/2019    Sig - Route: Infuse 3 mL into a venous catheter Every 12 (Twelve) Hours. - Intravenous    Reason for Discontinue: Patient Transfer    thrombin topical (Discontinued)  As Needed 5/13/2019 5/13/2019    Sig: As Needed.    Reason for Discontinue: Patient Discharge          Lab Results (last 48 hours)     Procedure Component Value Units Date/Time    CBC & Differential [[id number]] Collected:  05/14/19 0559    Specimen:  Blood Updated:  05/14/19 0659    Narrative:       The following orders were created for panel order CBC & Differential.  Procedure                               Abnormality         Status                     ---------                               -----------         ------                     CBC Auto Differential[[id number]]        Abnormal            Final result                 Please view results for these tests on the individual orders.    CBC Auto Differential [[id number]]  (Abnormal) Collected:  05/14/19 0559    Specimen:  Blood Updated:  05/14/19 0659     WBC 9.24 10*3/mm3      RBC 4.30 10*6/mm3      Hemoglobin 11.2 g/dL      Hematocrit 34.7 %      MCV 80.7 fL      MCH 26.0 pg      MCHC 32.3 g/dL      RDW 14.4 %      RDW-SD 42.4 fl      MPV 11.5 fL      Platelets 284 10*3/mm3      Neutrophil % 72.2 %      Lymphocyte % 19.2 %      Monocyte % 7.0 %      Eosinophil % 0.8 %      Basophil % 0.4 %      Immature Grans % 0.4 %      Neutrophils, Absolute 6.67 10*3/mm3      Lymphocytes, Absolute 1.77 10*3/mm3      Monocytes, Absolute 0.65 10*3/mm3      Eosinophils, Absolute 0.07 10*3/mm3      Basophils, Absolute 0.04 10*3/mm3      Immature Grans, Absolute 0.04 10*3/mm3      nRBC 0.0 /100 WBC     Basic Metabolic Panel  [[id number]] Collected:  05/14/19 0559    Specimen:  Blood Updated:  05/14/19 0651    POC Glucose Once [[id number]]  (Abnormal) Collected:  05/14/19 0226    Specimen:  Blood Updated:  05/14/19 0240     Glucose 201 mg/dL      Comment: : [id number] Justyna LisaMeter ID: [id number]       POC Glucose Once [[id number]]  (Abnormal) Collected:  05/13/19 2347    Specimen:  Blood Updated:  05/13/19 2359     Glucose 295 mg/dL      Comment: : [id number] Justyna LisaMeter ID: [id number]       POC Glucose Once [[id number]]  (Abnormal) Collected:  05/13/19 2155    Specimen:  Blood Updated:  05/13/19 2225     Glucose 381 mg/dL      Comment: : [id number] Justyna LisaMeter ID: [id number]       POC Glucose Once [[id number]]  (Abnormal) Collected:  05/13/19 2034    Specimen:  Blood Updated:  05/13/19 2056     Glucose 465 mg/dL      Comment: : [id number] Ray (Darnall) KatelynMeter ID: [id number]       POC Glucose Once [[id number]]  (Abnormal) Collected:  05/13/19 2033    Specimen:  Blood Updated:  05/13/19 2055     Glucose 430 mg/dL      Comment: : [id number] Ray (Darnall) KatelynMeter ID: [id number]       POC Glucose Once [[id number]]  (Abnormal) Collected:  05/13/19 1648    Specimen:  Blood Updated:  05/13/19 1659     Glucose 310 mg/dL      Comment: : [id number] Corby NicoleMeter ID: [id number]       POC Glucose Once [[id number]]  (Abnormal) Collected:  05/13/19 1449    Specimen:  Blood Updated:  05/13/19 1503     Glucose 226 mg/dL      Comment: : [id number] Jaime KathyMeter ID: [id number]       POC Glucose Once [[id number]]  (Abnormal) Collected:  05/13/19 0821    Specimen:  Blood Updated:  05/13/19 0832     Glucose 277 mg/dL      Comment: : [id number] Rasmes LaurenMeter ID: [id number]             Lab Results (last 48 hours)     Procedure Component Value Units Date/Time    CBC & Differential [[id number]] Collected:  05/14/19 0559    Specimen:  Blood Updated:  05/14/19 0659    Narrative:       The  following orders were created for panel order CBC & Differential.  Procedure                               Abnormality         Status                     ---------                               -----------         ------                     CBC Auto Differential[[id number]]        Abnormal            Final result                 Please view results for these tests on the individual orders.    CBC Auto Differential [[id number]]  (Abnormal) Collected:  05/14/19 0559    Specimen:  Blood Updated:  05/14/19 0659     WBC 9.24 10*3/mm3      RBC 4.30 10*6/mm3      Hemoglobin 11.2 g/dL      Hematocrit 34.7 %      MCV 80.7 fL      MCH 26.0 pg      MCHC 32.3 g/dL      RDW 14.4 %      RDW-SD 42.4 fl      MPV 11.5 fL      Platelets 284 10*3/mm3      Neutrophil % 72.2 %      Lymphocyte % 19.2 %      Monocyte % 7.0 %      Eosinophil % 0.8 %      Basophil % 0.4 %      Immature Grans % 0.4 %      Neutrophils, Absolute 6.67 10*3/mm3      Lymphocytes, Absolute 1.77 10*3/mm3      Monocytes, Absolute 0.65 10*3/mm3      Eosinophils, Absolute 0.07 10*3/mm3      Basophils, Absolute 0.04 10*3/mm3      Immature Grans, Absolute 0.04 10*3/mm3      nRBC 0.0 /100 WBC     Basic Metabolic Panel [[id number]] Collected:  05/14/19 0559    Specimen:  Blood Updated:  05/14/19 0651    POC Glucose Once [[id number]]  (Abnormal) Collected:  05/14/19 0226    Specimen:  Blood Updated:  05/14/19 0240     Glucose 201 mg/dL      Comment: : [id number] Justyna LisaMeter ID: [id number]       POC Glucose Once [[id number]]  (Abnormal) Collected:  05/13/19 2347    Specimen:  Blood Updated:  05/13/19 2359     Glucose 295 mg/dL      Comment: : [id number] Justyna LisaMeter ID: [id number]       POC Glucose Once [[id number]]  (Abnormal) Collected:  05/13/19 2155    Specimen:  Blood Updated:  05/13/19 2225     Glucose 381 mg/dL      Comment: : [id number] Justyna LisaMeter ID: [id number]       POC Glucose Once [[id number]]  (Abnormal) Collected:  05/13/19 2034     Specimen:  Blood Updated:  05/13/19 2056     Glucose 465 mg/dL      Comment: : [id number] Ray (Darnall) KatelynMeter ID: [id number]       POC Glucose Once [[id number]]  (Abnormal) Collected:  05/13/19 2033    Specimen:  Blood Updated:  05/13/19 2055     Glucose 430 mg/dL      Comment: : [id number] Ray (Darnall) KatelynMeter ID: [id number]       POC Glucose Once [[id number]]  (Abnormal) Collected:  05/13/19 1648    Specimen:  Blood Updated:  05/13/19 1659     Glucose 310 mg/dL      Comment: : [id number] Tavarez NicoleMeter ID: [id number]       POC Glucose Once [[id number]]  (Abnormal) Collected:  05/13/19 1449    Specimen:  Blood Updated:  05/13/19 1503     Glucose 226 mg/dL      Comment: : [id number] Sandoval KathyMeter ID: [id number]       POC Glucose Once [[id number]]  (Abnormal) Collected:  05/13/19 0821    Specimen:  Blood Updated:  05/13/19 0832     Glucose 277 mg/dL      Comment: : [id number] Ramses LaurenMeter ID: [id number]             Orders (last 48 hrs)     Start     Ordered    05/14/19 0900  levoFLOXacin (LEVAQUIN) tablet 250 mg  Daily      05/13/19 1507    05/14/19 0900  bisacodyl (DULCOLAX) suppository 10 mg  Daily,   Status:  Discontinued      05/13/19 2203    05/14/19 0730  glipiZIDE (GLUCOTROL) tablet 2.5 mg  Every Morning Before Breakfast      05/13/19 1507    05/14/19 0600  CBC & Differential  Morning Draw      05/13/19 1507    05/14/19 0600  Basic Metabolic Panel  Morning Draw      05/13/19 1507    05/14/19 0600  CBC Auto Differential  PROCEDURE ONCE      05/14/19 0001    05/14/19 0241  POC Glucose Once  Once      05/14/19 0226    05/14/19 0035  bisacodyl (DULCOLAX) suppository 10 mg  Daily PRN      05/14/19 0037    05/14/19 0000  POC Glucose Once  Once      05/13/19 2347    05/13/19 2300  pantoprazole (PROTONIX) EC tablet 40 mg  Every Early Morning      05/13/19 2203    05/13/19 2245  insulin lispro (humaLOG) injection 0-24 Units  4 Times Daily With Meals & Nightly      05/13/19  2154 05/13/19 2226  POC Glucose Once  Once      05/13/19 2155 05/13/19 2129  Inpatient Hospitalist Consult  Once     Specialty:  Hospitalist  Provider:  Alexandru Pickett,     05/13/19 2128 05/13/19 2100  amitriptyline (ELAVIL) tablet 200 mg  Nightly      05/13/19 1507    05/13/19 2100  montelukast (SINGULAIR) tablet 10 mg  Nightly      05/13/19 1507    05/13/19 2100  pravastatin (PRAVACHOL) tablet 20 mg  Nightly,   Status:  Discontinued      05/13/19 1507    05/13/19 2100  sennosides-docusate sodium (SENOKOT-S) 8.6-50 MG tablet 2 tablet  Nightly      05/13/19 1507    05/13/19 2100  sulfamethoxazole-trimethoprim (BACTRIM DS,SEPTRA DS) 800-160 MG per tablet 160 mg  Every 12 Hours Scheduled      05/13/19 1507    05/13/19 2100  sodium chloride 0.9 % flush 3 mL  Every 12 Hours Scheduled      05/13/19 1507    05/13/19 2100  famotidine (PEPCID) injection 20 mg  Every 12 Hours Scheduled      05/13/19 1507    05/13/19 2100  famotidine (PEPCID) tablet 20 mg  Every 12 Hours Scheduled      05/13/19 1507    05/13/19 2100  rosuvastatin (CRESTOR) tablet 5 mg  Nightly      05/13/19 1626    05/13/19 2056  POC Glucose Once  Once      05/13/19 2033 05/13/19 2056  POC Glucose Once  Once      05/13/19 2034 05/13/19 2054  Diet Regular; Consistent Carbohydrate  Diet Effective Now      05/13/19 2054 05/13/19 2000  ceFAZolin (ANCEF) 1 g/100 mL 0.9% NS IVPB (mbp)  Every 8 Hours      05/13/19 1507    05/13/19 1930  budesonide-formoterol (SYMBICORT) 80-4.5 MCG/ACT inhaler 2 puff  2 Times Daily - RT      05/13/19 1507    05/13/19 1800  linagliptin (TRADJENTA) 5 mg, metFORMIN ER (GLUCOPHAGE-XR) 500 mg for JENTADUETO XR 5-500  2 Times Daily With Meals      05/13/19 1507    05/13/19 1800  potassium chloride (MICRO-K) CR capsule 10 mEq  2 Times Daily With Meals      05/13/19 1507    05/13/19 1800  insulin lispro (humaLOG) injection 2-9 Units  4 Times Daily With Meals & Nightly,   Status:  Discontinued      05/13/19 3350     05/13/19 1700  POC Glucose 4x Daily AC & at Bedtime  4 Times Daily Before Meals & at Bedtime      05/13/19 1632    05/13/19 1700  POC Glucose Once  Once      05/13/19 1648    05/13/19 1632  Do NOT Hold Basal Insulin When Patient is NPO, Hold Bolus Dose if NPO  Continuous      05/13/19 1632    05/13/19 1632  Follow [facility] Hypoglycemia Standing Orders For Blood Glucose Less Than 70 mg/dL  Until Discontinued      05/13/19 1632    05/13/19 1632  Hypoglycemia Treatment - Alert Patient That is Not NPO and Can Safely Swallow  Until Discontinued     Comments:  Administer 4 oz Fruit Juice OR 4 oz Regular Soda OR 8 oz Milk OR 15-30 grams (1 tube) of Glucose Gel.  Recheck Blood Glucose 15 Minutes After Ingestion, Repeat Treatment & Continue to Recheck Blood Sugar Every 15 Minutes Until Blood Glucose is 70 mg/dL or Higher.  Once Blood Glucose is 70 mg/dL or Higher and if It Will Be more than 60 Minutes Until the Next Meal, Provide Appropriate Snack (Including Carbohydrate Food) Based on Meal Plan Order. Give Meal Tray As Soon As Possible.    05/13/19 1632    05/13/19 1632  Hypoglycemia Treatment - Patient Has IV Access - Unresponsive, NPO or Unable To Safely Swallow  Until Discontinued     Comments:  Administer 25g (50ml) D50W IV Push.  Recheck Blood Glucose 15 Minutes After Administration, if Blood Glucose Remains Less Than 70 mg/dl, Repeat Treatment   Recheck Blood Glucose 15 Minutes After 2nd Administration, if Blood Glucose Remains Less Than 70 mg/dL After 2nd Dose of D50W, Contact Provider for Further Treatment Orders & Consider Adding IVF With D5W for Maintenance    05/13/19 1632    05/13/19 1632  Hypoglycemia Treatment - Patient Without IV Access - Unresponsive, NPO or Unable To Safely Swallow  Until Discontinued     Comments:  Administer 1mg Glucagon SQ & Establish IV Access.  Turn Patient on Side - Nausea / Vomiting May Occur.  Recheck Blood Glucose 15 Minutes After Administration.  If Blood Glucose Remains Less Than 70,  Administer 25g D50W IV Push (50ml).  Recheck Blood Glucose 15 Minutes After Administration of D50W, if Blood Glucose Remains Less Than 70 mg/dl, Contact Provider for Further Treatment Orders & Consider Adding IVF With D5 for Maintenance    05/13/19 1632    05/13/19 1632  Notify Provider of event. Communicate needs and document in EMR.  Once      05/13/19 1632    05/13/19 1632  Document event and patients response to treatment in EMR, any medications given to be documented on MAR.  Once      05/13/19 1632    05/13/19 1631  dextrose (GLUTOSE) oral gel 15 g  Every 15 Minutes PRN      05/13/19 1632    05/13/19 1631  dextrose (D50W) 25 g/ 50mL Intravenous Solution 25 g  Every 15 Minutes PRN      05/13/19 1632    05/13/19 1631  glucagon (human recombinant) (GLUCAGEN DIAGNOSTIC) injection 1 mg  As Needed      05/13/19 1632    05/13/19 1630  amLODIPine (NORVASC) tablet 5 mg  Daily      05/13/19 1507    05/13/19 1630  fluticasone (FLONASE) 50 MCG/ACT nasal spray 1 spray  Daily      05/13/19 1507    05/13/19 1630  furosemide (LASIX) tablet 20 mg  Daily      05/13/19 1507    05/13/19 1600  Neurovascular Checks  Every 4 Hours      05/13/19 1507    05/13/19 1600  Incentive Spirometry  Every 2 Hours While Awake      05/13/19 1507    05/13/19 1509  sodium chloride 0.9 % infusion  Continuous      05/13/19 1507    05/13/19 1509  sodium chloride 0.9 % infusion  Continuous      05/13/19 1507    05/13/19 1507  Code Status and Medical Interventions:  Continuous      05/13/19 1507    05/13/19 1507  Vital Signs  Every Shift      05/13/19 1507    05/13/19 1507  Ambulate Patient  Every Shift      05/13/19 1507    05/13/19 1507  Empty drain  Every Shift      05/13/19 1507    05/13/19 1507  Oxygen Therapy- Nasal Cannula; Titrate for SPO2: equal to or greater than, 92%, per policy  Continuous      05/13/19 1507    05/13/19 1507  Continuous Pulse Oximetry  Continuous      05/13/19 1507    05/13/19 1507  Diet Regular  Diet Effective Now,    Status:  Canceled      05/13/19 1507    05/13/19 1507  Advance Diet as Tolerated  Until Discontinued      05/13/19 1507    05/13/19 1507  PT Consult: Eval & Treat  Once      05/13/19 1507    05/13/19 1507  OT Consult: Eval & Treat post operative care, gait training, brace assistance, rehab needs  Once      05/13/19 1507    05/13/19 1507  Insert Peripheral IV  Once      05/13/19 1507    05/13/19 1507  Saline Lock & Maintain IV Access  Continuous      05/13/19 1507    05/13/19 1507  Place Sequential Compression Device  Once      05/13/19 1507    05/13/19 1507  Maintain Sequential Compression Device  Continuous      05/13/19 1507    05/13/19 1507  Inpatient Admission  Once      05/13/19 1507    05/13/19 1507  Bracing Equipment Communication Spinal; Rigid Neck Collar; Not Applicable; At All Times  Once      05/13/19 1507    05/13/19 1506  HYDROmorphone (DILAUDID) injection solution 1 mg  Every 3 Hours PRN      05/13/19 1507    05/13/19 1506  oxyCODONE-acetaminophen (PERCOCET)  MG per tablet 2 tablet  Every 4 Hours PRN      05/13/19 1507    05/13/19 1506  tiZANidine (ZANAFLEX) tablet 4 mg  Every 8 Hours PRN      05/13/19 1507    05/13/19 1506  sodium chloride 0.9 % flush 3-10 mL  As Needed      05/13/19 1507    05/13/19 1506  diphenhydrAMINE (BENADRYL) capsule 25 mg  Nightly PRN      05/13/19 1507    05/13/19 1506  ondansetron (ZOFRAN) tablet 4 mg  Every 6 Hours PRN      05/13/19 1507    05/13/19 1506  ondansetron (ZOFRAN) injection 4 mg  Every 6 Hours PRN      05/13/19 1507    05/13/19 1506  ondansetron (ZOFRAN) injection 4 mg  Every 6 Hours PRN,   Status:  Discontinued      05/13/19 1507    05/13/19 1505  metoclopramide (REGLAN) tablet 10 mg  As Needed      05/13/19 1507    05/13/19 1503  POC Glucose Once  Once      05/13/19 1449    05/13/19 1439  Vital signs every 5 minutes for 15 minutes, every 15 minutes thereafter.  Once,   Status:  Canceled      05/13/19 1438    05/13/19 1439  Call Anesthesiologist for  additional IV Fluid bolus for Hypotension/Tachycardia  Continuous,   Status:  Canceled      05/13/19 1438    05/13/19 1439  Notify Anesthesia of Any Acute Changes in Patient Condition  Until Discontinued,   Status:  Canceled      05/13/19 1438    05/13/19 1439  Notify Anesthesia for Unrelieved Pain  Until Discontinued,   Status:  Canceled      05/13/19 1438    05/13/19 1439  Once DC criteria to floor met, follow surgeon's orders.  Until Discontinued,   Status:  Canceled      05/13/19 1438    05/13/19 1439  Discharge patient from PACU when discharge criteria is met.  Until Discontinued,   Status:  Canceled      05/13/19 1438    05/13/19 1438  Apply warming blanket  As Needed,   Status:  Canceled     Comments:  For a recorded temp of <36.9 C    05/13/19 1438    05/13/19 1438  oxyCODONE-acetaminophen (PERCOCET)  MG per tablet 1 tablet  Once As Needed      05/13/19 1438    05/13/19 1438  Morphine sulfate (PF) injection 2 mg  Every 10 Minutes PRN,   Status:  Discontinued      05/13/19 1438    05/13/19 1438  labetalol (NORMODYNE,TRANDATE) injection 5 mg  Every 5 Minutes PRN,   Status:  Discontinued      05/13/19 1438    05/13/19 1438  hydrALAZINE (APRESOLINE) injection 5 mg  Every 10 Minutes PRN,   Status:  Discontinued      05/13/19 1438    05/13/19 1438  ipratropium-albuterol (DUO-NEB) nebulizer solution 3 mL  Once As Needed,   Status:  Discontinued      05/13/19 1438    05/13/19 1438  naloxone (NARCAN) injection 0.04 mg  As Needed,   Status:  Discontinued      05/13/19 1438    05/13/19 1438  flumazenil (ROMAZICON) injection 0.2 mg  As Needed,   Status:  Discontinued      05/13/19 1438    05/13/19 1438  ondansetron (ZOFRAN) injection 4 mg  As Needed,   Status:  Discontinued      05/13/19 1438    05/13/19 1438  metoclopramide (REGLAN) injection 5 mg  Every 15 Minutes PRN,   Status:  Discontinued      05/13/19 1438    05/13/19 1438  meperidine (DEMEROL) injection 12.5 mg  Every 5 Minutes PRN,   Status:  Discontinued       05/13/19 1438    05/13/19 1438  fentaNYL citrate (PF) (SUBLIMAZE) injection 25 mcg  As Needed      05/13/19 1438    05/13/19 1438  atropine sulfate injection 0.5 mg  Once As Needed,   Status:  Discontinued      05/13/19 1438    05/13/19 1413  XR Spine Cervical 2 View  1 Time Imaging      05/13/19 1412    05/13/19 1413  FL C Arm During Surgery  1 Time Imaging      05/13/19 1413    05/13/19 1250  sodium chloride (NS) irrigation solution  As Needed,   Status:  Discontinued      05/13/19 1250    05/13/19 1250  thrombin topical  As Needed,   Status:  Discontinued      05/13/19 1250    05/13/19 1249  gelatin absorbable (GELFOAM) powder  As Needed,   Status:  Discontinued      05/13/19 1250    05/13/19 0914  sodium chloride 0.9 % flush 3 mL  Every 12 Hours Scheduled,   Status:  Discontinued      05/13/19 0912    05/13/19 0914  lactated ringers infusion  Continuous,   Status:  Discontinued      05/13/19 0912    05/13/19 0914  acetaminophen (TYLENOL) tablet 1,000 mg  Once      05/13/19 0912    05/13/19 0912  Oxygen Therapy- Nasal Cannula; Titrate for SPO2: equal to or greater than, 90%  Continuous,   Status:  Canceled      05/13/19 0912    05/13/19 0912  Pulse Oximetry, Continuous  Continuous,   Status:  Canceled      05/13/19 0912    05/13/19 0912  Insert Peripheral IV  Once,   Status:  Canceled      05/13/19 0912    05/13/19 0912  Saline Lock & Maintain IV Access  Continuous,   Status:  Canceled      05/13/19 0912    05/13/19 0911  metoclopramide (REGLAN) injection 10 mg  Once As Needed      05/13/19 0912    05/13/19 0911  Vital Signs - Per Anesthesia Protocol  As Needed,   Status:  Canceled      05/13/19 0912    05/13/19 0911  sodium chloride 0.9 % flush 1-10 mL  As Needed,   Status:  Discontinued      05/13/19 0912    05/13/19 0911  buffered lidocaine syringe 0.5 mL  Once As Needed,   Status:  Discontinued      05/13/19 0912    05/13/19 0911  midazolam (VERSED) injection 1 mg  Every 5 Minutes PRN,   Status:   Discontinued      05/13/19 0912    05/13/19 0911  midazolam (VERSED) injection 2 mg  Every 5 Minutes PRN,   Status:  Discontinued      05/13/19 0912    05/13/19 0900  sodium chloride 0.9 % flush 3 mL  Every 12 Hours Scheduled,   Status:  Discontinued      05/13/19 0753    05/13/19 0833  POC Glucose Once  Once      05/13/19 0821    05/13/19 0801  Type & Screen  Once      05/13/19 0800    05/13/19 0755  ceFAZolin (ANCEF) 1 g/100 mL 0.9% NS IVPB (mbp)  Once      05/13/19 0753    05/13/19 0755  oxyCODONE ER (oxyCONTIN) 12 hr tablet 20 mg  Once      05/13/19 0753    05/13/19 0755  lactated ringers infusion 1,000 mL  Continuous,   Status:  Discontinued      05/13/19 0753    05/13/19 0754  Insert Peripheral IV  Once,   Status:  Canceled      05/13/19 0753    05/13/19 0754  Saline Lock & Maintain IV Access  Continuous,   Status:  Canceled      05/13/19 0753    05/13/19 0754  Insert Peripheral IV  Once,   Status:  Canceled      05/13/19 0753    05/13/19 0754  Maintain IV Access  Continuous,   Status:  Canceled      05/13/19 0753    05/13/19 0754  POC Glucose STAT  STAT,   Status:  Canceled      05/13/19 0753    05/13/19 0754  Notify Anesthesia if Blood Sugar Greater Than 180  Once,   Status:  Canceled      05/13/19 0753    05/13/19 0753  sodium chloride 0.9 % flush 3 mL  As Needed,   Status:  Discontinued      05/13/19 0753    05/13/19 0753  sodium chloride 0.9 % flush 1-10 mL  As Needed,   Status:  Discontinued      05/13/19 0753    05/13/19 0753  buffered lidocaine syringe 0.5 mL  Once As Needed,   Status:  Discontinued      05/13/19 0753    05/13/19 0753  lactated ringers infusion  Continuous PRN,   Status:  Discontinued      05/13/19 0753    Unscheduled  Apply ice to affected area/incisions as needed for pain or swelling  As Needed      05/13/19 1507    --  sulfamethoxazole-trimethoprim (BACTRIM DS,SEPTRA DS) 800-160 MG per tablet  2 Times Daily      05/13/19 0749    --  levoFLOXacin (LEVAQUIN) 250 MG tablet  Daily       05/13/19 0749           Operative/Procedure Notes (all)      YIKNA Sanders MD at 5/13/2019  7:00 AM  Version 1 of 1       Removal anterior instrumentation, ACDF procedure Note    Marguerite Rob  5/13/2019    Pre-op Diagnosis:     1.  Status post previous ACDF C5-6, [place], [place], 1999  2.  Status post revision ACDF C5-6, [place], [place], 1999  3.  Increasing chronic neck pain  4.  Chronic occipital headaches  5.  Left shoulder and arm radiculopathy  6.  Adjacent level degenerative disc disease C3-5, C6-7, worse C4-5  7.  Cervical facet arthropathy, worse C3-5  8.  Focal scoliosis, concave left C3-5  9.  Central and severe bilateral foraminal stenosis, left worse than right, C3-5  10.  Mild foraminal stenosis right C6-7    Post-op Diagnosis:    same    Procedure/CPT® Codes:    1.  Removal of anterior instrumentation C5-6  2.  Exploration of fusion C5-6  3.  Anterior cervical discectomy with interbody fusion C3-4, C4-5  4.  Anterior spinal instrumentation C3-5 (ATEC anterior plate and screws)  5.  Use of PEKK interbody biomechanical device for fusion C3-4, C4-5 (RTI PEKK spacers × 2)  6.  Use of locally obtained autograft bone for fusion C3-5  7.  Use of allograft bone matrix for fusion C3-5  8.  Use of operating microscope for decompression and microdissection  9.  Use of fluoroscopy for confirmation of surgical level, placement of instrumentation and PEKK spacers  10.  Intraoperative neural monitoring    Anesthesia: General    Surgeon: RONEN Sanders MD    Assistant: Gabino Drew PA-C, Saji Livingston PA-C    Estimated Blood Loss: 50 mL    Complications: None    Condition: Stable to PACU.    Indications:    The patient is a 59-year-old who sees [facility] nurse practitioner for medical issues.  She presented to the office with a history of a prior ACDF performed at C5-6 in 1999 that required revision very soon after the index procedure.  The patient relate a history of having increasing  chronic neck pain, chronic occipital headaches, as well as left shoulder and arm radiculopathy.  Imaging studies revealed adjacent level degenerative disc disease from C3-5 as well as at C6-7, that was worse at C4-5.  There was facet arthropathy worse from C3-5 as well as a focal scoliosis, that was concave to the left.  She had central and severe foraminal stenosis left side worse than right from C3-5 with only minimal stenosis on the right side of C6-7.  It was felt that the vast majority of her residual symptoms were coming from the C3-4 and C4-5 disc spaces.    After failing conservative measures, it was mutually decided that surgery would be the best option.  Risks, benefits, and complications of surgery were discussed with the patient.  The patient appeared well informed and wished to proceed.  We specifically discussed the risks of infection, blood loss, nerve root injury, CSF leak, spinal cord injury, and the possibility of incomplete resolution of symptoms.  We also discussed the possible risk of a nonunion and the potential need for additional surgery in the event of a pseudoarthrosis or hardware failure.    Operative Procedure:    After obtaining informed consent and verifying the correct operative levels, the patient was brought to the operating room and placed supine on an operating table.  A general anesthetic was provided by the anesthesia service with the assistance of an endotracheal tube.  Once this was properly positioned and secured, a bump was placed under the patient's shoulders placing the neck into a gentle extended position.  Head halter traction was then used with 10 pounds weight to maintain head position.  The shoulders were taped using 3 inch wide cloth tape to assist with radiographic visualization.  Fluoroscopy was used to identify the existing instrumentation at C5-6 as well as the disc spaces from C3 to 5, and the skin was marked for our planned incision.  The anterior neck region  was then prepped and draped in usual sterile fashion.  A surgical timeout was taken to confirm this was the correct patient, we are working at the correct levels, and that preoperative antibiotics were given in a timely fashion.    A transverse incision was then created over the anterior cervical region using a 10 blade scalpel directly centered over the levels of interest.  Dissection was carried sharply through subcutaneous tissues.  The platysma was divided in line with the incision and blunt dissection was carried along the medial border of the sternocleidomastoid muscle, lateral to the strap musculature the neck.  The carotid pulse was then palpated, and dissection was carried medial to the carotid sheath and lateral to the trachea and esophagus.  Handheld Cloward retractors along with Kitners were used to dissect through pretracheal and prevertebral fascia.      The existing instrumentation at C5-6 was easily identified and cleared of soft tissues using Bovie cautery.  A flat screwdriver was used to remove the center locking caps.  A 2.5 mm hex screwdriver was then used to remove the screws the right-sided screws both at C5 and C6 were completely lateral to the spine with 0 purchase.  The left C5 screw did have some bony purchase.  FloSeal was used where screws were removed to assist with hemostasis.  The plate was removed without difficulty.  The C5-6 fusion was then explored and felt to be solid with no gross motion identified.    A radiographic marker was placed into the disc space of C3-4 and fluoroscopy was used to confirm that we are indeed at the correct levels.  The longus coli muscles were then elevated from either side of the spine using Bovie cautery.    An initial discectomy was started by creating an annulotomy with Bovie cautery.  A Fernandes elevator was used to remove some of the disc material off of the endplates.  Disc material was initially removed using forward angled curettes and pituitaries.   Some anterior osteophytes were removed using a rongeur.  All retrieved bone was cleaned, morcellized and saved for later packing into the disc spaces to assist with obtaining a fusion.  Wayne pins were then placed to allow gentle distraction across the disc spaces.  The microscope was then positioned for the microdissection and decompression portion of the procedure.    The disc spaces of C3-4 and C4-5 were prepared in an identical fashion.  I used Kerrisons to remove remaining anterior osteophytes off of the endplates.  Straight curettes were used to remove the cartilaginous endplates and all remaining disc material.  The high-speed bur was then used to remove posterior osteophytes and to contour the endplates in preparation for fusion.  A forward angled curette was used to free up the posterior margins of the vertebral bodies, releasing the posterior longitudinal ligament.  Posterior osteophytes, posterior disc material, and the PLL were all resected using Kerrisons.  Kerrisons were also used to perform a bilateral neural foraminotomy.  At the end of the discectomy decompression, the central spinal canal and the exiting nerve roots at each level appeared to be free of compression.  Bleeding in the epidural space was controlled using thrombin with Gelfoam powder.  The wound was then copiously irrigated with saline solution.    Next, trial spacers from RTI were tapped into position into each of the disc spaces.  Once the appropriate size was determined for each disc space, actual PEKK spacers matching the same size as the trials were delivered to the sterile field.  The spacers were packed as tightly as possible with a combination of locally obtained autograft bone and allograft bone matrix.  The spacers were then malleted into position into each of the disc spaces under fluoroscopic guidance.  They were placed as PEKK interbody biomechanical devices to assist with fusion.    After confirming the PEKK spacers were  properly positioned with fluoroscopy, the McBee pins were removed.  Remaining anterior osteophytes were contoured off of the vertebral bodies using a combination of the high-speed bur and the Rongeur to allow for the best possible plate fixation.  An anterior plate from the [facility] instrumentation set was then chosen to span C3 to 5.  This was held into position using a series of screws.    Final fluoroscopy imaging confirmed adequate position of the plate and screws as well as the interbody spacers.  All implants appeared to be properly positioned with good maintenance of cervical alignment.  A final inspection of the operative field was then undertaken to ensure that we had adequate hemostasis.  Bleeding at this point was controlled with thrombin with Gelfoam powder and bipolar cautery.  A drain was then placed anterior to the spine exiting through a poke hole inferior of the incision.    Closure was accomplished by reapproximating the platysma with a 3-0 Vicryl.  Immediate subcutaneous tissues were reapproximated with a 4-0 Vicryl in a buried fashion.  Final skin closure was accomplished with Mastisol and Steri-Strips.  The wound was then washed and a sterile Bioclusive dressing was applied.  The patient was then placed into a hard cervical collar, extubated, and sent to the recovery room in good stable condition.    The patient tolerated the procedure well.  There were no complications.  We estimated blood loss to be 50 mL.  Intraoperative neural monitoring was used during the procedure.  We used motor evoked potentials, free run EMG, and SSEPs.  There were no neuro monitoring signal changes during the procedure.    Gabino Drew PA-C provided critical assistance during the procedure.  His assistance was medically necessary in order to allow the procedure to occur in the most safe and efficient manner.  He assisted with not only agent positioning and wound closure, but more importantly with retraction of delicate  neurovascular structures, assistance during the discectomy decompression, as well as the placement of the PEKK spacers and instrumentation to obtain a fusion.    RONEN Sanders MD     Date: 5/13/2019  Time: 2:23 PM          Electronically signed by YINKA Sanders MD at 5/13/2019  2:23 PM         Physician Progress Notes (last 48 hours) (Notes from 5/12/2019  7:10 AM through 5/14/2019  7:10 AM)     No notes of this type exist for this encounter.           Consult Notes (last 48 hours) (Notes from 5/12/2019  7:10 AM through 5/14/2019  7:10 AM)      Alexandru Pickett DO at 5/13/2019  9:51 PM      Consult Orders    1. Inpatient Hospitalist Consult [[id number]] ordered by Abrahan Boland MD at 05/13/19 2128                      [facility] Medicine Consult Note    Date of Admission: 5/13/2019  Date of Consult: 05/13/19    Primary Care Physician: Katiuska Macias APRN  Referring Physician: Lucas Boland MD    Chief complaint/Reason for consultation: Diabetes management    History of Present Illness  Marguerite Rob is a 59-year-old female with a past medical history of diabetes mellitus type 2, congestive heart failure, chronic hepatitis C, alcoholic fatty liver disease, COPD with ongoing tobacco use, gastroesophageal reflux disease, please see below for complete list.  Patient was admitted for planned surgery of cervical stenosis per Dr. Clark Sanders. Hospitalist have been consulted to manage hyperglycemia.  Patient currently states her pain is well controlled.  She denies chest pain, shortness of breathing, or abdominal pain.  She does report tendency for constipation therefore Dulcolax suppository has been added as needed.  She also reports reflux, normally treated with both Pepcid and Protonix.  We will restart.  We will follow along.    Review of Systems   A 10 point review of systems was completed, all negative except for those discussed in HPI    Past Medical  History:   Past Medical History:   Diagnosis Date   • Alcoholic fatty liver    • Alkaline phosphatase raised    • Anxiety    • Asthma     IgE-MEDIATED ALLERGIC ASTHMA   • Backache     CHRONIC   • CHF (congestive heart failure) (CMS/HCC)    • Chronic hepatitis C (CMS/HCC)     2b. Fibrosure .05/F0, necroinflam .14/A0. Repeat .05/F0, .13/A0      • Chronic neck pain    • Constipation    • COPD (chronic obstructive pulmonary disease) (CMS/HCC)    • Diabetes (CMS/HCC)    • Diarrhea    • Elevated levels of transaminase & lactic acid dehydrogenase    • Emphysema, unspecified (CMS/HCC)    • Generalized abdominal pain    • GERD (gastroesophageal reflux disease)     WITH ESOPHAGITIS   • Hepatitis    • High risk sexual behavior    • Hypertension    • Hypokalemia    • Irritable bowel syndrome (IBS)    • Multiple joint pain    • Need for vaccination    • Numbness and tingling in left arm    • On long term drug therapy    • Shoulder pain    • Tobacco dependence syndrome        Past Surgical History:   Past Surgical History:   Procedure Laterality Date   • APPENDECTOMY     • CERVICAL FUSION  1999    C5-6   • CHOLECYSTECTOMY     • COLONOSCOPY  02/08/2016   • COLONOSCOPY W/ POLYPECTOMY  02/08/2016    External and internal hemorrhoids.The examination was otherwise normal.Fluid aspiration performed.Several biopsies obtained in the entire colon.   • ENDOSCOPY  02/08/2016    Mildly severe esophagitis.Gastritis.Normal examined duodenum.Several biopsies obtained in the lower third of the esophagus.Several biopsies obtained in the gastric antrum.Several biopsies obtained in the first part of the duodenum.   • ENDOSCOPY  08/13/2012    Esophagitis seen. Biopsy taken. Gastritis in stomach. Biopsy taken. Normal duodenum. Biopsy taken.   • ENDOSCOPY AND COLONOSCOPY  08/13/2012    Internal & external hemorrhoids found. Scope could not pass into the TI.   • FINGER SURGERY Left 04/2019    thumb - piece of metal removed   • HEMORRHOIDECTOMY     •  HERNIA REPAIR      pt states no hernia was found so no procedure was done so they closed her back up   • INJECTION OF MEDICATION  08/01/2013    METHYLPREDNISONE, X2   • INJECTION OF MEDICATION  11/03/2011    KENALOG   • INJECTION OF MEDICATION  02/24/2011    ROCEPHIN   • KNEE SURGERY Right    • TONSILLECTOMY AND ADENOIDECTOMY     • TOTAL ABDOMINAL HYSTERECTOMY WITH SALPINGO OOPHORECTOMY     • UPPER GASTROINTESTINAL ENDOSCOPY  02/08/2016       Code Status: Full, if unable speak for herself her son or daughter will speak for her    Family History: family history includes Breast cancer in her sister; Cancer in her father and other; Coronary artery disease in her mother; Diabetes in her brother, father, and mother; Endometrial cancer in her other; Heart failure in her father and mother; Ovarian cancer in her other; Thyroid disease in her father.    Social History:  reports that she has been smoking cigarettes.  She has a 11.25 pack-year smoking history. She has never used smokeless tobacco. She reports that she does not drink alcohol or use drugs.    Allergies:   Allergies   Allergen Reactions   • Doxycycline Itching   • Penicillins Rash       Home Medications:   Prior to Admission medications    Medication Sig Start Date End Date Taking? Authorizing Provider   amitriptyline (ELAVIL) 100 MG tablet Take 200 mg by mouth Every Night.   Yes Modesta Chaudhary MD   amLODIPine (NORVASC) 5 MG tablet Take 5 mg by mouth Daily.   Yes Modesta Chaudhary MD   cetirizine (zyrTEC) 10 MG tablet Take 10 mg by mouth Daily.   Yes Modesta Chaudhary MD   famotidine (PEPCID) 20 MG tablet Take 20 mg by mouth Daily.   Yes ProviderModesta MD   fluconazole (DIFLUCAN) 100 MG tablet Take 100 mg by mouth 1 (One) Time Per Week.   Yes Modesta Chaudhary MD   fluticasone (FLONASE) 50 MCG/ACT nasal spray 1 spray into each nostril Daily. 2/13/17  Yes Modesta Chaudhary MD   Fluticasone Furoate-Vilanterol (BREO ELLIPTA) 100-25  MCG/INH inhaler Inhale 1 puff Daily. 3/27/19  Yes Denisse Marcano MD   furosemide (LASIX) 20 MG tablet Take 20 mg by mouth Daily.   Yes Modesta Chaudhary MD   gemfibrozil (LOPID) 600 MG tablet Take 600 mg by mouth 2 (Two) Times a Day Before Meals.   Yes Modesta Chaudhary MD   glyBURIDE (DIAbeta) 2.5 MG tablet Take 2.5 mg by mouth 2 (Two) Times a Day With Meals.   Yes Modesta Chaudhary MD   HYDROcodone-acetaminophen (NORCO) 7.5-325 MG per tablet Take 1 tablet by mouth 3 (Three) Times a Day.   Yes Modesta Chaudhary MD   JANUMET XR  MG tablet sustained-release 24 hour Take 1 tablet by mouth 2 (Two) Times a Day. 2/2/17  Yes Modesta Chaudhary MD   levoFLOXacin (LEVAQUIN) 250 MG tablet Take 250 mg by mouth Daily.   Yes Modesta Chaudhary MD   metoclopramide (REGLAN) 10 MG tablet Take 10 mg by mouth As Needed (nausea).   Yes Modesta Chaudhary MD   montelukast (SINGULAIR) 10 MG tablet Take 1 tablet by mouth Every Night. 2/23/17  Yes Denisse Marcano MD   pantoprazole (PROTONIX) 40 MG EC tablet Take 40 mg by mouth Daily.   Yes Modesta Chaudhary MD   potassium chloride (K-DUR,KLOR-CON) 10 MEQ CR tablet Take 10 mEq by mouth 2 (Two) Times a Day.   Yes Modesta Chaudhary MD   pravastatin (PRAVACHOL) 20 MG tablet Take 20 mg by mouth Every Night.   Yes Modesta Chaudhary MD   sennosides-docusate sodium (SENOKOT-S) 8.6-50 MG tablet Take 2 tablets by mouth Every Night.   Yes Modesta Chaudhary MD   sulfamethoxazole-trimethoprim (BACTRIM DS,SEPTRA DS) 800-160 MG per tablet Take 1 tablet by mouth 2 (Two) Times a Day.   Yes Modesta Chaudhary MD       Scheduled Medications    amitriptyline 200 mg Oral Nightly   amLODIPine 5 mg Oral Daily   [START ON 5/14/2019] bisacodyl 10 mg Rectal Daily   budesonide-formoterol 2 puff Inhalation BID - RT   ceFAZolin 1 g Intravenous Q8H   famotidine 20 mg Intravenous Q12H   Or      famotidine 20 mg Oral Q12H   fluticasone 1 spray Nasal Daily    furosemide 20 mg Oral Daily   [START ON 5/14/2019] glipiZIDE 2.5 mg Oral QAM AC   insulin lispro 0-24 Units Subcutaneous 4x Daily With Meals & Nightly   [START ON 5/14/2019] levoFLOXacin 250 mg Oral Daily   linagliptin-metFORMIN ER (JENTADUETO XR) 5-500 mg combo dose  Oral BID With Meals   montelukast 10 mg Oral Nightly   pantoprazole 40 mg Oral Q AM   potassium chloride 10 mEq Oral BID With Meals   rosuvastatin 5 mg Oral Nightly   sennosides-docusate sodium 2 tablet Oral Nightly   sodium chloride 3 mL Intravenous Q12H   sulfamethoxazole-trimethoprim 1 tablet Oral Q12H       Pertinent Data:   Lab Results (last 72 hours)     Procedure Component Value Units Date/Time    POC Glucose Once [[id number]]  (Abnormal) Collected:  05/13/19 2155    Specimen:  Blood Updated:  05/13/19 2225     Glucose 381 mg/dL      Comment: : [id number] Justyna LisaMeter ID: [id number]       POC Glucose Once [[id number]]  (Abnormal) Collected:  05/13/19 2034    Specimen:  Blood Updated:  05/13/19 2056     Glucose 465 mg/dL      Comment: : [id number] Ray (Darnall) KatelynMeter ID: [id number]       POC Glucose Once [[id number]]  (Abnormal) Collected:  05/13/19 2033    Specimen:  Blood Updated:  05/13/19 2055     Glucose 430 mg/dL      Comment: : [id number] Ray (Darnall) KatelynMeter ID: [id number]       POC Glucose Once [[id number]]  (Abnormal) Collected:  05/13/19 1648    Specimen:  Blood Updated:  05/13/19 1659     Glucose 310 mg/dL      Comment: : [id number] Corby NicoleMeter ID: [id number]       POC Glucose Once [[id number]]  (Abnormal) Collected:  05/13/19 1449    Specimen:  Blood Updated:  05/13/19 1503     Glucose 226 mg/dL      Comment: : [id number] Jaime KathyMeter ID: [id number]       POC Glucose Once [[id number]]  (Abnormal) Collected:  05/13/19 0821    Specimen:  Blood Updated:  05/13/19 0832     Glucose 277 mg/dL      Comment: : [id number] Ramses DavidMeter ID: [id number]           Imaging Results (last  24 hours)     Procedure Component Value Units Date/Time    XR Spine Cervical 2 View [[id number]] Collected:  05/13/19 1426     Updated:  05/13/19 1652    Narrative:       INTRAOPERATIVE FLUOROSCOPIC GUIDANCE 05/13/2019      INDICATION: Intraoperative fluoroscopic guidance. Removal of  instrumentation.      TECHNIQUE: 4 fluoroscopic images from the operating room were submitted  for evaluation. Please note, no radiologist was in attendance for  acquisition of these images. These images are available for future  reference to the attending surgeon. Total fluoroscopic time was 5  seconds.      FINDINGS: Intraoperative images related to removal of cervical fusion  hardware.        Impression:       1. Intraoperative fluoroscopic guidance as described.   2. Please refer to real-time fluoroscopy and operative report for full  details.  This report was finalized on 05/13/2019 14:30 by Dr. Loulou Palacios MD.    FL C Arm During Surgery [[id number]] Updated:  05/13/19 1652            Temp:  [97 °F (36.1 °C)-97.9 °F (36.6 °C)] 97.9 °F (36.6 °C)  Heart Rate:  [102-116] 112  Resp:  [12-24] 20  BP: (111-154)/(46-96) 136/77    Physical Exam   Constitutional: She is oriented to person, place, and time. She appears well-developed and well-nourished. No distress.   HENT:   Head: Normocephalic and atraumatic.   Eyes: Conjunctivae and EOM are normal. Pupils are equal, round, and reactive to light. No scleral icterus.   Neck: Normal range of motion. Neck supple. No JVD present. No tracheal deviation present.   Cardiovascular: Normal rate, regular rhythm, normal heart sounds and intact distal pulses. Exam reveals no gallop.   No murmur heard.  Pulmonary/Chest: Effort normal and breath sounds normal. No respiratory distress. She has no wheezes. She has no rales.   Abdominal: Soft. Bowel sounds are normal. She exhibits no distension. There is no tenderness. There is no guarding.   Musculoskeletal: Normal range of motion. She exhibits no  edema.   Cervical collar in use   Neurological: She is alert and oriented to person, place, and time.   Skin: Skin is warm and dry. No rash noted. She is not diaphoretic. No erythema. No pallor.   Psychiatric: She has a normal mood and affect. Her behavior is normal.   Vitals reviewed.      Assessment:     Active Hospital Problems    Diagnosis   • **DM (diabetes mellitus) (CMS/[facility])   • Stenosis, cervical spine   • Benign essential HTN   • TORRES (nonalcoholic steatohepatitis)   • COPD with asthma (CMS/[facility])   • Cigarette nicotine dependence, uncomplicated       Plan:   1.  Monitor glucose before meals and at bedtime with regular insulin sliding scale coverage  2.  Medications reviewed restarted per attending, resumed Protonix  3.  Add as needed Dulcolax suppository daily for constipation  4.  Labs in a.m. BMP and CBC with differential  5.  DVT prophylaxis per attending    I discussed the patients findings and my recommendations with: Alexandru Pickett DO  Time spent: 35 minutes    Agree with plan, discussed with NP.     Michaela Fernandez, GUILLE  05/13/19  11:32 PM            Electronically signed by Alexandru Pickett DO at 5/14/2019  1:34 AM

## 2019-05-14 NOTE — THERAPY EVALUATION
Acute Care - Physical Therapy Initial Evaluation  [facility]     Patient Name: Marguerite Rob  : 1960  MRN: [id number]  Today's Date: 2019   Onset of Illness/Injury or Date of Surgery: 19  Date of Referral to PT: 19  Referring Physician: Dr. Sanders      Admit Date: 2019    Visit Dx:     ICD-10-CM ICD-9-CM   1. Impaired mobility Z74.09 799.89     Patient Active Problem List   Diagnosis   • Cigarette nicotine dependence, uncomplicated   • Chronic allergic rhinitis   • COPD with asthma (CMS/HCC)   • Chest pain   • Dyspnea   • Benign essential HTN   • Mixed dyslipidemia   • TORRES (nonalcoholic steatohepatitis)   • GERD with esophagitis   • DM (diabetes mellitus) (CMS/HCC)   • Mold exposure   • Smoker   • Overweight (BMI 25.0-29.9)   • Gastroesophageal reflux disease with esophagitis   • Pain of upper abdomen   • Full incontinence of feces   • Nausea   • Iron deficiency anemia due to chronic blood loss   • Stenosis, cervical spine     Past Medical History:   Diagnosis Date   • Alcoholic fatty liver    • Alkaline phosphatase raised    • Anxiety    • Asthma     IgE-MEDIATED ALLERGIC ASTHMA   • Backache     CHRONIC   • CHF (congestive heart failure) (CMS/HCC)    • Chronic hepatitis C (CMS/HCC)     2b. Fibrosure .05/F0, necroinflam .14/A0. Repeat .05/F0, .13/A0      • Chronic neck pain    • Constipation    • COPD (chronic obstructive pulmonary disease) (CMS/HCC)    • Diabetes (CMS/HCC)    • Diarrhea    • Elevated levels of transaminase & lactic acid dehydrogenase    • Emphysema, unspecified (CMS/HCC)    • Generalized abdominal pain    • GERD (gastroesophageal reflux disease)     WITH ESOPHAGITIS   • Hepatitis    • High risk sexual behavior    • Hypertension    • Hypokalemia    • Irritable bowel syndrome (IBS)    • Multiple joint pain    • Need for vaccination    • Numbness and tingling in left arm    • On long term drug therapy    • Shoulder pain    • Tobacco dependence syndrome      Past  Surgical History:   Procedure Laterality Date   • ANTERIOR CERVICAL DISCECTOMY W/ FUSION N/A 5/13/2019    Procedure: EXPLORATION OF FUSION C5-6, ANTERIOR CERVICAL DISCECTOMY FUSION C3-5 WITH INSTRUMENTATION C3-6;  Surgeon: YINKA Sanders MD;  Location: [facility] OR;  Service: Orthopedic Spine   • APPENDECTOMY     • CERVICAL FUSION  1999    C5-6   • CHOLECYSTECTOMY     • COLONOSCOPY  02/08/2016   • COLONOSCOPY W/ POLYPECTOMY  02/08/2016    External and internal hemorrhoids.The examination was otherwise normal.Fluid aspiration performed.Several biopsies obtained in the entire colon.   • ENDOSCOPY  02/08/2016    Mildly severe esophagitis.Gastritis.Normal examined duodenum.Several biopsies obtained in the lower third of the esophagus.Several biopsies obtained in the gastric antrum.Several biopsies obtained in the first part of the duodenum.   • ENDOSCOPY  08/13/2012    Esophagitis seen. Biopsy taken. Gastritis in stomach. Biopsy taken. Normal duodenum. Biopsy taken.   • ENDOSCOPY AND COLONOSCOPY  08/13/2012    Internal & external hemorrhoids found. Scope could not pass into the TI.   • FINGER SURGERY Left 04/2019    thumb - piece of metal removed   • HARDWARE REMOVAL N/A 5/13/2019    Procedure: REMOVAL OF INSTRUMENTATION;  Surgeon: YINKA Sanders MD;  Location: [facility];  Service: Orthopedic Spine   • HEMORRHOIDECTOMY     • HERNIA REPAIR      pt states no hernia was found so no procedure was done so they closed her back up   • INJECTION OF MEDICATION  08/01/2013    METHYLPREDNISONE, X2   • INJECTION OF MEDICATION  11/03/2011    KENALOG   • INJECTION OF MEDICATION  02/24/2011    ROCEPHIN   • KNEE SURGERY Right    • TONSILLECTOMY AND ADENOIDECTOMY     • TOTAL ABDOMINAL HYSTERECTOMY WITH SALPINGO OOPHORECTOMY     • UPPER GASTROINTESTINAL ENDOSCOPY  02/08/2016        PT ASSESSMENT (last 12 hours)      Physical Therapy Evaluation     Row Name 05/14/19 0752          PT Evaluation Time/Intention    Subjective  Information  complains of;pain trouble swallowing liquid  -SB     Document Type  evaluation  -SB     Mode of Treatment  physical therapy  -SB     Patient Effort  good  -SB     Symptoms Noted During/After Treatment  none  -SB     Row Name 05/14/19 0752          General Information    Patient Profile Reviewed?  yes  -SB     Onset of Illness/Injury or Date of Surgery  05/13/19  -SB     Referring Physician  Dr. Sanders  -SB     Patient Observations  alert;cooperative;agree to therapy  -SB     Patient/Family Observations  no family present  -SB     General Observations of Patient  alert, c-collar, no apparent distress  -SB     Prior Level of Function  independent:;all household mobility;dressing;grooming;bathing  -SB     Equipment Currently Used at Home  nebulizer  -SB     Pertinent History of Current Functional Problem  s/p removal of instrumentation, exploration of fusion C5-6, anterior cervical discectomy fusion C3-5 with instrumentation C3-6  -SB     Existing Precautions/Restrictions  fall;spinal  -SB     Risks Reviewed  patient:;nausea/vomiting;LOB;dizziness;increased discomfort;change in vital signs;increased drainage;lines disloged  -SB     Benefits Reviewed  patient:;improve function;increase independence;increase strength;increase balance;decrease pain;decrease risk of DVT;increase knowledge;improve skin integrity  -SB     Row Name 05/14/19 0752          Relationship/Environment    Primary Source of Support/Comfort  child(lilliana)  -SB     Lives With  alone  -SB     Row Name 05/14/19 0752          Resource/Environmental Concerns    Current Living Arrangements  home/apartment/condo  -SB     Resource/Environmental Concerns  none  -SB     Transportation Concerns  car, none  -SB     Row Name 05/14/19 0752          Home Main Entrance    Number of Stairs, Main Entrance  one  -SB     Stair Railings, Main Entrance  none  -SB     Row Name 05/14/19 0752          Cognitive Assessment/Interventions    Additional Documentation   Cognitive Assessment/Intervention (Group)  -SB     Row Name 05/14/19 0752          Cognitive Assessment/Intervention- PT/OT    Affect/Mental Status (Cognitive)  WFL  -SB     Orientation Status (Cognition)  oriented x 4  -SB     Follows Commands (Cognition)  WFL  -SB     Cognitive Function (Cognitive)  WFL  -SB     Personal Safety Interventions  fall prevention program maintained;gait belt;muscle strengthening facilitated;nonskid shoes/slippers when out of bed;supervised activity  -SB     Row Name 05/14/19 0752          Bed Mobility Assessment/Treatment    Bed Mobility Assessment/Treatment  bed mobility (all) activities  -SB     [place] Level (Bed Mobility)  independent  -SB     Row Name 05/14/19 0752          Transfer Assessment/Treatment    Transfer Assessment/Treatment  sit-stand transfer;stand-sit transfer  -SB     Sit-Stand [place] (Transfers)  independent  -SB     Stand-Sit [place] (Transfers)  independent  -SB     Row Name 05/14/19 0752          Gait/Stairs Assessment/Training    Gait/Stairs Assessment/Training  gait/ambulation independence;distance ambulated  -SB     [place] Level (Gait)  supervision  -SB     Distance in Feet (Gait)  200  -SB     Pattern (Gait)  step-through  -SB     Row Name 05/14/19 0752          General ROM    GENERAL ROM COMMENTS  BLE WFL  -SB     Row Name 05/14/19 0752          MMT (Manual Muscle Testing)    General MMT Comments  BLE4+/5  -SB     Row Name 05/14/19 0752          Sensory Assessment/Intervention    Sensory General Assessment  no sensation deficits identified  -SB     Row Name 05/14/19 0752          Pain Assessment    Additional Documentation  Pain Scale: Numbers Pre/Post-Treatment (Group)  -SB     Row Name 05/14/19 0752          Pain Scale: Numbers Pre/Post-Treatment    Pain Scale: Numbers, Pretreatment  8/10  -SB     Pain Scale: Numbers, Post-Treatment  8/10  -SB     Pain Location - Side  Left  -SB     Pain Location  neck  -SB     Pain Intervention(s)   Repositioned;Ambulation/increased activity  -SB     Row Name 05/14/19 0752          Orthotics & Prosthetics Management    Orthosis Location  spinal orthosis  -SB     Additional Documentation  Orthosis Location (Row)  -SB     Row Name 05/14/19 0752          Spinal Orthosis Management    Type (Spinal Orthosis)  cervical collar, hard  -SB     Fabrication Comment (Spinal Orthosis)  on patient  -SB     Functional Design (Spinal Orthosis)  static orthosis  -SB     Therapeutic Indications (Spinal Orthosis)  post-op positioning/protection;stabilization and support  -SB     Wearing Schedule (Spinal Orthosis)  wear full time  -SB     Orthosis Training (Spinal Orthosis)  patient;all orthosis skills  -SB     Compliance/Wearing Issues (Spinal Orthosis)  patient/caregiver comprehend strategies;patient/caregiver comprehend rationale for orthosis  -SB     Row Name             Wound 05/13/19 1412 Other (See comments) neck incision    Wound - Properties Group Date first assessed: 05/13/19  -JR Time first assessed: 1412  -JR Side: Other (See comments)  -JR Location: neck  -JR Type: incision  -JR    Row Name 05/14/19 0752          Plan of Care Review    Plan of Care Reviewed With  patient;family  -SB     Row Name 05/14/19 0752          Physical Therapy Clinical Impression    Date of Referral to PT  05/13/19  -SB     Patient/Family Goals Statement (PT Clinical Impression)  go home  -SB     Criteria for Skilled Interventions Met (PT Clinical Impression)  no  -SB     Care Plan Review (PT)  care plan/treatment goals reviewed;evaluation/treatment results reviewed;risks/benefits reviewed;current/potential barriers reviewed;patient/other agree to care plan  -SB     Care Plan Review, Other Participant (PT Clinical Impression)  family  -SB     Row Name 05/14/19 0752          Positioning and Restraints    Pre-Treatment Position  in bed  -SB     Post Treatment Position  bed  -SB     In Bed  sitting;call light within reach;encouraged to call for  assist;with family/caregiver;with brace  -SB     Row Name 05/14/19 0752          Physical Therapy Discharge Summary    Additional Documentation  Discharge Summary, PT Eval (Group)  -SB     Row Name 05/14/19 0752          Discharge Summary, PT Eval    Reason for Discharge (PT Discharge Summary)  no further needs identified  -SB     Outcomes Achieved Upon Discharge (PT Discharge Summary)  evaluation only  -SB     Transfer to Another Level of Care or Facility (PT Discharge Summary)  patient will continue therapy goals following discharge  -SB     Row Name 05/14/19 0752          Living Environment    Home Accessibility  tub/shower is not walk in;stairs to enter home  -SB       User Key  (r) = Recorded By, (t) = Taken By, (c) = Cosigned By    Initials Name Provider Type    Carly Yuen, RN Registered Nurse    Obdulia Hogue PT Physical Therapist        Physical Therapy Education     Title: PT OT SLP Therapies (Done)     Topic: Physical Therapy (Done)     Point: Mobility training (Done)     Learning Progress Summary           Patient Acceptance, E, VU by SB at 5/14/2019  9:52 AM    Comment:  edu on spinal precautions, bracing, d/c plans   Family Acceptance, E, VU by SB at 5/14/2019  9:52 AM    Comment:  edu on spinal precautions, bracing, d/c plans                   Point: Home exercise program (Done)     Learning Progress Summary           Patient Acceptance, E, VU by SB at 5/14/2019  9:52 AM    Comment:  edu on spinal precautions, bracing, d/c plans   Family Acceptance, E, VU by SB at 5/14/2019  9:52 AM    Comment:  edu on spinal precautions, bracing, d/c plans                   Point: Body mechanics (Done)     Learning Progress Summary           Patient Acceptance, E, VU by SB at 5/14/2019  9:52 AM    Comment:  edu on spinal precautions, bracing, d/c plans   Family Acceptance, E, VU by SB at 5/14/2019  9:52 AM    Comment:  edu on spinal precautions, bracing, d/c plans                   Point: Precautions  (Done)     Learning Progress Summary           Patient Acceptance, E, VU by SB at 5/14/2019  9:52 AM    Comment:  edu on spinal precautions, bracing, d/c plans   Family Acceptance, E, VU by SB at 5/14/2019  9:52 AM    Comment:  edu on spinal precautions, bracing, d/c plans                               User Key     Initials Effective Dates Name Provider Type Discipline    SB 08/31/18 -  Obdulia Prater, PT Physical Therapist PT              PT Recommendation and Plan  Anticipated Discharge Disposition (PT): home with assist  Therapy Frequency (PT Clinical Impression): evaluation only  Outcome Summary/Treatment Plan (PT)  Anticipated Discharge Disposition (PT): home with assist  Reason for Discharge (PT Discharge Summary): no further needs identified  Plan of Care Reviewed With: patient, family  Progress: no change  Outcome Summary: PT eval completed. Pt alert and oriented x4. Pt educated on c-collar donning and doffing, and spinal precautions. Pt performed bed mob and t/f ind. Pt ambulated 200 feet with sup and performed 5 steps with CGA and handrails. Pt with no deficits that require skilled PT at this time. Anticipate d/c home with assist from family.   Outcome Measures     Row Name 05/14/19 0900             How much help from another person do you currently need...    Turning from your back to your side while in flat bed without using bedrails?  4  -SB      Moving from lying on back to sitting on the side of a flat bed without bedrails?  4  -SB      Moving to and from a bed to a chair (including a wheelchair)?  4  -SB      Standing up from a chair using your arms (e.g., wheelchair, bedside chair)?  4  -SB      Climbing 3-5 steps with a railing?  3  -SB      To walk in hospital room?  4  -SB      AM-PAC 6 Clicks Score  23  -SB         Functional Assessment    Outcome Measure Options  AM-PAC 6 Clicks Basic Mobility (PT)  -SB        User Key  (r) = Recorded By, (t) = Taken By, (c) = Cosigned By    Initials Name  Provider Type    Obdulia Hogue PT Physical Therapist         Time Calculation:   PT Charges     Row Name 05/14/19 0955             Time Calculation    Start Time  0804  -SB      Stop Time  0843  -SB      Time Calculation (min)  39 min  -SB      PT Received On  05/14/19  -SB        User Key  (r) = Recorded By, (t) = Taken By, (c) = Cosigned By    Initials Name Provider Type    Obdulia Hogue, ABDI Physical Therapist        Therapy Charges for Today     Code Description Service Date Service Provider Modifiers Qty    33447050210 HC PT EVAL LOW COMPLEXITY 3 5/14/2019 Obdulia Prater PT GP 1          PT G-Codes  Outcome Measure Options: AM-PAC 6 Clicks Basic Mobility (PT)  AM-PAC 6 Clicks Score: 23      Obdulia Prater PT  5/14/2019

## 2019-05-14 NOTE — PLAN OF CARE
Problem: Patient Care Overview  Goal: Plan of Care Review  Outcome: Ongoing (interventions implemented as appropriate)   05/14/19 0860   Coping/Psychosocial   Plan of Care Reviewed With patient   Plan of Care Review   Progress improving   OTHER   Outcome Summary VSS. Drsg c/d/i, sl edema noted, no difficulty swallowing. Collar in place. Continue to have pain, prn meds given. Blood Glucose elevated MD consulted orders obtained/monitored. SCD in place. Safety maintained.       Problem: Laminectomy/Foraminotomy/Discectomy (Adult)  Goal: Signs and Symptoms of Listed Potential Problems Will be Absent, Minimized or Managed (Laminectomy/Foraminotomy/Discectomy)  Outcome: Ongoing (interventions implemented as appropriate)      Problem: Fall Risk (Adult)  Goal: Identify Related Risk Factors and Signs and Symptoms  Outcome: Ongoing (interventions implemented as appropriate)    Goal: Absence of Fall  Outcome: Ongoing (interventions implemented as appropriate)

## 2019-05-14 NOTE — DISCHARGE SUMMARY
Date of Discharge:  5/14/2019    Admission Diagnosis: M54.12    Discharge Diagnosis:   1.  Status post previous ACDF C5-6, [place], [place], 1999  2.  Status post revision ACDF C5-6, [place], [place], 1999  3.  Increasing chronic neck pain  4.  Chronic occipital headaches  5.  Left shoulder and arm radiculopathy  6.  Adjacent level degenerative disc disease C3-5, C6-7, worse C4-5  7.  Cervical facet arthropathy, worse C3-5  8.  Focal scoliosis, concave left C3-5  9.  Central and severe bilateral foraminal stenosis, left worse than right, C3-5  10.  Mild foraminal stenosis right C6-7  11.  Status post NEHAL C5-6,  EOF C5-6, ACDF with instrumentation C3-5, 5/13/2019        Consults During Admission: None    Hospital Course  Patient is a 59 y.o. female Known to our practice. Admitted for the above cervical fusion.  This has been well tolerated and the patient will be discharged home today in good stable condition with instructions for brace at all times.  No driving until directed.  Patient will follow-up with Dr. Sanders's clinic in two weeks. They will call if problems arise.         Condition on Discharge:  STABLE    Vital Signs  Temp:  [97.5 °F (36.4 °C)-98.5 °F (36.9 °C)] 98.5 °F (36.9 °C)  Heart Rate:  [103-116] 108  Resp:  [12-24] 18  BP: (111-154)/(56-96) 139/67    Physical Exam:   Alert and oriented ×3, no acute distress, grossly neurovascularly intact, vital signs stable, dressing clean dry and intact, moving all extremities without focal deficit      Discharge Disposition  Home or Self Care    Discharge Medications     Discharge Medications      New Medications      Instructions Start Date   oxyCODONE-acetaminophen  MG per tablet  Commonly known as:  PERCOCET   1 tablet, Oral, Every 4 Hours PRN         Continue These Medications      Instructions Start Date   amitriptyline 100 MG tablet  Commonly known as:  ELAVIL   200 mg, Oral, Nightly      amLODIPine 5 MG tablet  Commonly known as:   NORVASC   5 mg, Oral, Daily      cetirizine 10 MG tablet  Commonly known as:  zyrTEC   10 mg, Oral, Daily      famotidine 20 MG tablet  Commonly known as:  PEPCID   20 mg, Oral, Daily      fluconazole 100 MG tablet  Commonly known as:  DIFLUCAN   100 mg, Oral, Weekly      fluticasone 50 MCG/ACT nasal spray  Commonly known as:  FLONASE   1 spray, Nasal, Daily      Fluticasone Furoate-Vilanterol 100-25 MCG/INH inhaler  Commonly known as:  BREO ELLIPTA   1 puff, Inhalation, Daily      furosemide 20 MG tablet  Commonly known as:  LASIX   20 mg, Oral, Daily      gemfibrozil 600 MG tablet  Commonly known as:  LOPID   600 mg, Oral, 2 Times Daily Before Meals      glyBURIDE 2.5 MG tablet  Commonly known as:  DIAbeta   2.5 mg, Oral, 2 Times Daily With Meals      JANUMET XR  MG tablet  Generic drug:  SITagliptin-metFORMIN HCl ER   1 tablet, Oral, 2 Times Daily      levoFLOXacin 250 MG tablet  Commonly known as:  LEVAQUIN   250 mg, Oral, Daily      metoclopramide 10 MG tablet  Commonly known as:  REGLAN   10 mg, Oral, As Needed      montelukast 10 MG tablet  Commonly known as:  SINGULAIR   10 mg, Oral, Nightly      pantoprazole 40 MG EC tablet  Commonly known as:  PROTONIX   40 mg, Oral, Daily      potassium chloride 10 MEQ CR tablet  Commonly known as:  K-DUR,KLOR-CON   10 mEq, Oral, 2 Times Daily      pravastatin 20 MG tablet  Commonly known as:  PRAVACHOL   20 mg, Oral, Nightly      sennosides-docusate sodium 8.6-50 MG tablet  Commonly known as:  SENOKOT-S   2 tablets, Oral, Nightly      sulfamethoxazole-trimethoprim 800-160 MG per tablet  Commonly known as:  BACTRIM DS,SEPTRA DS   1 tablet, Oral, 2 Times Daily         Stop These Medications    HYDROcodone-acetaminophen 7.5-325 MG per tablet  Commonly known as:  NORCO            Discharge Diet: Resume Home diet, advance as tolerated    Activity at Discharge: Resume home activity advace as tolerated, no lifting, no twisting, no bending, brace as directed, no driving  until directed.     Follow-up Appointments  Followup with PCP within one week  Followup [facility] at 2weeks post-op         Gabino Drew PA-C  05/14/19  8:10 AM

## 2019-05-15 ENCOUNTER — READMISSION MANAGEMENT (OUTPATIENT)
Dept: CALL CENTER | Facility: HOSPITAL | Age: 59
End: 2019-05-15

## 2019-05-15 NOTE — PAYOR COMM NOTE
"[facility]  KR[place],  [phone]  OR  FAX  [phone]      Marguerite Rob Luz Maria (59 y.o. Female)     Date of Birth Social Security Number Address Home Phone MRN    1960  [address] [address] [phone] [id number]    Jainism Marital Status          Protestant        Admission Date Admission Type Admitting Provider Attending Provider Department, Room/Bed    5/13/19 Elective YINKA Sanders MD  [facility] 3A, 347/1    Discharge Date Discharge Disposition Discharge Destination        5/14/2019 Home or Self Care              Attending Provider:  (none)   Allergies:  Doxycycline, Penicillins    Isolation:  None   Infection:  None   Code Status:  Prior    Ht:  160 cm (62.99\")   Wt:  69.7 kg (153 lb 10.6 oz)    Admission Cmt:  None   Principal Problem:  DM (diabetes mellitus) (CMS/[facility]) [E11.9]                 Active Insurance as of 5/13/2019     Primary Coverage     Payor Plan Insurance Group Employer/Plan Group    [facility]      Payor Plan Address Payor Plan Phone Number Payor Plan Fax Number Effective Dates    [address] [address]   4/1/2014 - None Entered    [place] [address]       Subscriber Name Subscriber Birth Date Member ID       MARGUERITE ROB 1960 [id number]                 Emergency Contacts      (Rel.) Home Phone Work Phone Mobile Phone    Teddy Webber (Son) [phone] -- [phone]    Shantel Cortes (Daughter) -- -- [phone]    LEDA ROB (Daughter) -- -- [phone]               Discharge Summary      Gabino Drew PA-C at 5/14/2019  8:10 AM            Date of Discharge:  5/14/2019    Admission Diagnosis: M54.12    Discharge Diagnosis:   1.  Status post previous ACDF C5-6, [place], [place], 1999  2.  Status post revision ACDF C5-6, [place], [place], 1999  3.  Increasing chronic neck pain  4.  Chronic occipital headaches  5.  Left shoulder and arm " radiculopathy  6.  Adjacent level degenerative disc disease C3-5, C6-7, worse C4-5  7.  Cervical facet arthropathy, worse C3-5  8.  Focal scoliosis, concave left C3-5  9.  Central and severe bilateral foraminal stenosis, left worse than right, C3-5  10.  Mild foraminal stenosis right C6-7  11.  Status post R OI C5-6,  E OF C5-6, A CDF with instrumentation C3-5, 5/13/2019        Consults During Admission: None    Hospital Course  Patient is a 59 y.o. female Known to our practice. Admitted for the above cervical fusion.  This has been well tolerated and the patient will be discharged home today in good stable condition with instructions for brace at all times.  No driving until directed.  Patient will follow-up with Dr. Sanders's clinic in two weeks. They will call if problems arise.         Condition on Discharge:  STABLE    Vital Signs  Temp:  [97.5 °F (36.4 °C)-98.5 °F (36.9 °C)] 98.5 °F (36.9 °C)  Heart Rate:  [103-116] 108  Resp:  [12-24] 18  BP: (111-154)/(56-96) 139/67    Physical Exam:   Alert and oriented ×3, no acute distress, grossly neurovascularly intact, vital signs stable, dressing clean dry and intact, moving all extremities without focal deficit      Discharge Disposition  Home or Self Care    Discharge Medications     Discharge Medications      New Medications      Instructions Start Date   oxyCODONE-acetaminophen  MG per tablet  Commonly known as:  PERCOCET   1 tablet, Oral, Every 4 Hours PRN         Continue These Medications      Instructions Start Date   amitriptyline 100 MG tablet  Commonly known as:  ELAVIL   200 mg, Oral, Nightly      amLODIPine 5 MG tablet  Commonly known as:  NORVASC   5 mg, Oral, Daily      cetirizine 10 MG tablet  Commonly known as:  zyrTEC   10 mg, Oral, Daily      famotidine 20 MG tablet  Commonly known as:  PEPCID   20 mg, Oral, Daily      fluconazole 100 MG tablet  Commonly known as:  DIFLUCAN   100 mg, Oral, Weekly      fluticasone 50 MCG/ACT nasal  spray  Commonly known as:  FLONASE   1 spray, Nasal, Daily      Fluticasone Furoate-Vilanterol 100-25 MCG/INH inhaler  Commonly known as:  BREO ELLIPTA   1 puff, Inhalation, Daily      furosemide 20 MG tablet  Commonly known as:  LASIX   20 mg, Oral, Daily      gemfibrozil 600 MG tablet  Commonly known as:  LOPID   600 mg, Oral, 2 Times Daily Before Meals      glyBURIDE 2.5 MG tablet  Commonly known as:  DIAbeta   2.5 mg, Oral, 2 Times Daily With Meals      JANUMET XR  MG tablet  Generic drug:  SITagliptin-metFORMIN HCl ER   1 tablet, Oral, 2 Times Daily      levoFLOXacin 250 MG tablet  Commonly known as:  LEVAQUIN   250 mg, Oral, Daily      metoclopramide 10 MG tablet  Commonly known as:  REGLAN   10 mg, Oral, As Needed      montelukast 10 MG tablet  Commonly known as:  SINGULAIR   10 mg, Oral, Nightly      pantoprazole 40 MG EC tablet  Commonly known as:  PROTONIX   40 mg, Oral, Daily      potassium chloride 10 MEQ CR tablet  Commonly known as:  K-DUR,KLOR-CON   10 mEq, Oral, 2 Times Daily      pravastatin 20 MG tablet  Commonly known as:  PRAVACHOL   20 mg, Oral, Nightly      sennosides-docusate sodium 8.6-50 MG tablet  Commonly known as:  SENOKOT-S   2 tablets, Oral, Nightly      sulfamethoxazole-trimethoprim 800-160 MG per tablet  Commonly known as:  BACTRIM DS,SEPTRA DS   1 tablet, Oral, 2 Times Daily         Stop These Medications    HYDROcodone-acetaminophen 7.5-325 MG per tablet  Commonly known as:  NORCO            Discharge Diet: Resume Home diet, advance as tolerated    Activity at Discharge: Resume home activity advace as tolerated, no lifting, no twisting, no bending, brace as directed, no driving until directed.     Follow-up Appointments  Followup with PCP within one week  Followup [facility] at 2weeks post-op         Gabino Drew PA-C  05/14/19  8:10 AM                Electronically signed by Gabino Drew PA-C at 5/14/2019  8:11 AM

## 2019-05-16 ENCOUNTER — READMISSION MANAGEMENT (OUTPATIENT)
Dept: CALL CENTER | Facility: HOSPITAL | Age: 59
End: 2019-05-16

## 2019-05-16 NOTE — OUTREACH NOTE
General Surgery Week 1 Survey      Responses   Facility patient discharged from?  [place]   Does the patient have one of the following disease processes/diagnoses(primary or secondary)?  General Surgery   Is there a successful TCM telephone encounter documented?  No   Week 1 attempt successful?  Yes   Call start time  1105   Rescheduled  Rescheduled-pt requested   Call end time  1105          Ruthann Patel RN

## 2019-05-17 ENCOUNTER — READMISSION MANAGEMENT (OUTPATIENT)
Dept: CALL CENTER | Facility: HOSPITAL | Age: 59
End: 2019-05-17

## 2019-05-17 NOTE — OUTREACH NOTE
General Surgery Week 1 Survey      Responses   Facility patient discharged from?  [place]   Does the patient have one of the following disease processes/diagnoses(primary or secondary)?  General Surgery   Is there a successful TCM telephone encounter documented?  No   Week 1 attempt successful?  Yes   Call start time  1813   Call end time  1817   Discharge diagnosis  S/P NEHAL C5-6, EOF C5-6, ACDF with instrumentation C3-5   Meds reviewed with patient/caregiver?  Yes   Is the patient having any side effects they believe may be caused by any medication additions or changes?  No   Does the patient have all medications related to this admission filled (includes all antibiotics, pain medications, etc.)  Yes   Is the patient taking all medications as directed (includes completed medication regime)?  Yes   Does the patient have a follow up appointment scheduled with their surgeon?  Yes   Has the patient kept scheduled appointments due by today?  N/A   Has home health visited the patient within 72 hours of discharge?  N/A   Psychosocial issues?  No   Did the patient receive a copy of their discharge instructions?  Yes   Nursing interventions  Reviewed instructions with patient   What is the patient's perception of their health status since discharge?  Improving   Nursing interventions  Nurse provided patient education   Is the patient /caregiver able to teach back basic post-op care?  Continue use of incentive spirometry at least 1 week post discharge, Practice 'cough and deep breath', Lifting as instructed by MD in discharge instructions, Do not remove steri-strips, Keep incision areas clean,dry and protected   Is the patient/caregiver able to teach back signs and symptoms of incisional infection?  Increased redness, swelling or pain at the incisonal site, Increased drainage or bleeding, Incisional warmth, Pus or odor from incision, Fever   Is the patient/caregiver able to teach back steps to recovery at home?  Make a list  of questions for surgeon's appointment, Eat a well-balance diet, Set small, achievable goals for return to baseline health, Rest and rebuild strength, gradually increase activity   Is the patient/caregiver able to teach back the hierarchy of who to call/visit for symptoms/problems? PCP, Specialist, Home health nurse, Urgent Care, ED, 911  No   Additional teach back comments  She says the incision is fine.  She is taking pain medication and has an appt on Monday.   Week 1 call completed?  Yes          Elizabeth Diez RN

## 2019-05-25 ENCOUNTER — READMISSION MANAGEMENT (OUTPATIENT)
Dept: CALL CENTER | Facility: HOSPITAL | Age: 59
End: 2019-05-25

## 2019-05-25 NOTE — OUTREACH NOTE
General Surgery Week 2 Survey      Responses   Facility patient discharged from?  [place]   Does the patient have one of the following disease processes/diagnoses(primary or secondary)?  General Surgery   Week 2 attempt successful?  No   Unsuccessful attempts  Attempt 1          Genaro Knott RN

## 2019-05-29 ENCOUNTER — READMISSION MANAGEMENT (OUTPATIENT)
Dept: CALL CENTER | Facility: HOSPITAL | Age: 59
End: 2019-05-29

## 2019-05-29 NOTE — OUTREACH NOTE
General Surgery Week 2 Survey      Responses   Facility patient discharged from?  [place]   Does the patient have one of the following disease processes/diagnoses(primary or secondary)?  General Surgery   Week 2 attempt successful?  Yes   Call start time  1732   Call end time  1734   Discharge diagnosis  S/P NEHAL C5-6, EOF C5-6, ACDF with instrumentation C3-5   Meds reviewed with patient/caregiver?  Yes   Is the patient having any side effects they believe may be caused by any medication additions or changes?  No   Does the patient have all medications related to this admission filled (includes all antibiotics, pain medications, etc.)  Yes   Is the patient taking all medications as directed (includes completed medication regime)?  Yes   Does the patient have a follow up appointment scheduled with their surgeon?  Yes   Has the patient kept scheduled appointments due by today?  Yes   Has home health visited the patient within 72 hours of discharge?  N/A   Psychosocial issues?  No   Did the patient receive a copy of their discharge instructions?  Yes   Nursing interventions  Reviewed instructions with patient   What is the patient's perception of their health status since discharge?  Improving   Nursing interventions  Nurse provided patient education   Is the patient /caregiver able to teach back basic post-op care?  Take showers only when approved by MD-sponge bathe until then, No tub bath, swimming, or hot tub until instructed by MD, Keep incision areas clean,dry and protected, Lifting as instructed by MD in discharge instructions   Is the patient/caregiver able to teach back signs and symptoms of incisional infection?  Increased redness, swelling or pain at the incisonal site, Increased drainage or bleeding, Incisional warmth, Pus or odor from incision, Fever   Is the patient/caregiver able to teach back steps to recovery at home?  Set small, achievable goals for return to baseline health   Is the patient/caregiver  able to teach back the hierarchy of who to call/visit for symptoms/problems? PCP, Specialist, Home health nurse, Urgent Care, ED, 911  Yes   Week 2 call completed?  Yes          Charo Murray RN

## 2019-06-07 ENCOUNTER — READMISSION MANAGEMENT (OUTPATIENT)
Dept: CALL CENTER | Facility: HOSPITAL | Age: 59
End: 2019-06-07

## 2019-06-07 NOTE — OUTREACH NOTE
General Surgery Week 3 Survey      Responses   Facility patient discharged from?  [place]   Does the patient have one of the following disease processes/diagnoses(primary or secondary)?  General Surgery   Week 3 attempt successful?  No   Unsuccessful attempts  Attempt 1          Ruthann Patel RN

## 2019-06-10 ENCOUNTER — READMISSION MANAGEMENT (OUTPATIENT)
Dept: CALL CENTER | Facility: HOSPITAL | Age: 59
End: 2019-06-10

## 2019-06-19 ENCOUNTER — READMISSION MANAGEMENT (OUTPATIENT)
Dept: CALL CENTER | Facility: HOSPITAL | Age: 59
End: 2019-06-19

## 2019-08-12 ENCOUNTER — TELEPHONE (OUTPATIENT)
Dept: GASTROENTEROLOGY | Facility: CLINIC | Age: 59
End: 2019-08-12

## 2019-08-12 NOTE — TELEPHONE ENCOUNTER
----- Message from Ashley Sandoval sent at 8/12/2019  8:07 AM CDT -----  Patient has colonoscopy scheduled Friday. She is needing a prep called in. Her pharmacy is   [facility] - [address] - [phone]  - [phone] (Phone)  [phone] (Fax)    Please call her when this is done. Thanks!

## 2019-08-12 NOTE — TELEPHONE ENCOUNTER
A voicemail has been left for the patient to make her aware that her Rx for prep was sent in to the pharmacy on 5-6-19. I did call the pharmacy to verify that they have that Rx ready for her to .

## 2019-08-14 RX ORDER — INSULIN GLARGINE 100 [IU]/ML
29 INJECTION, SOLUTION SUBCUTANEOUS NIGHTLY
COMMUNITY
End: 2019-11-11 | Stop reason: SDUPTHER

## 2019-08-16 ENCOUNTER — HOSPITAL ENCOUNTER (OUTPATIENT)
Facility: HOSPITAL | Age: 59
Setting detail: HOSPITAL OUTPATIENT SURGERY
Discharge: HOME | End: 2019-08-16
Attending: INTERNAL MEDICINE | Admitting: INTERNAL MEDICINE

## 2019-08-16 ENCOUNTER — ANESTHESIA EVENT (OUTPATIENT)
Dept: GASTROENTEROLOGY | Facility: HOSPITAL | Age: 59
End: 2019-08-16

## 2019-08-16 ENCOUNTER — ANESTHESIA (OUTPATIENT)
Dept: GASTROENTEROLOGY | Facility: HOSPITAL | Age: 59
End: 2019-08-16

## 2019-08-16 VITALS
DIASTOLIC BLOOD PRESSURE: 57 MMHG | RESPIRATION RATE: 18 BRPM | BODY MASS INDEX: 26.47 KG/M2 | TEMPERATURE: 97.2 F | OXYGEN SATURATION: 95 % | HEART RATE: 96 BPM | SYSTOLIC BLOOD PRESSURE: 108 MMHG | HEIGHT: 63 IN | WEIGHT: 149.38 LBS

## 2019-08-16 DIAGNOSIS — R11.0 NAUSEA: ICD-10-CM

## 2019-08-16 DIAGNOSIS — R10.10 PAIN OF UPPER ABDOMEN: ICD-10-CM

## 2019-08-16 DIAGNOSIS — K21.00 GASTROESOPHAGEAL REFLUX DISEASE WITH ESOPHAGITIS: ICD-10-CM

## 2019-08-16 DIAGNOSIS — R15.9 FULL INCONTINENCE OF FECES: ICD-10-CM

## 2019-08-16 DIAGNOSIS — D50.0 IRON DEFICIENCY ANEMIA DUE TO CHRONIC BLOOD LOSS: ICD-10-CM

## 2019-08-16 PROCEDURE — 88305 TISSUE EXAM BY PATHOLOGIST: CPT | Performed by: INTERNAL MEDICINE

## 2019-08-16 PROCEDURE — 88305 TISSUE EXAM BY PATHOLOGIST: CPT | Performed by: PATHOLOGY

## 2019-08-16 PROCEDURE — 25010000002 PROPOFOL 10 MG/ML EMULSION: Performed by: NURSE ANESTHETIST, CERTIFIED REGISTERED

## 2019-08-16 PROCEDURE — 25010000002 MIDAZOLAM PER 1 MG: Performed by: NURSE ANESTHETIST, CERTIFIED REGISTERED

## 2019-08-16 PROCEDURE — 45380 COLONOSCOPY AND BIOPSY: CPT | Performed by: INTERNAL MEDICINE

## 2019-08-16 PROCEDURE — 43239 EGD BIOPSY SINGLE/MULTIPLE: CPT | Performed by: INTERNAL MEDICINE

## 2019-08-16 RX ORDER — LIDOCAINE HYDROCHLORIDE 20 MG/ML
INJECTION, SOLUTION EPIDURAL; INFILTRATION; INTRACAUDAL; PERINEURAL AS NEEDED
Status: DISCONTINUED | OUTPATIENT
Start: 2019-08-16 | End: 2019-08-16 | Stop reason: SURG

## 2019-08-16 RX ORDER — DEXTROSE AND SODIUM CHLORIDE 5; .45 G/100ML; G/100ML
30 INJECTION, SOLUTION INTRAVENOUS CONTINUOUS PRN
Status: DISCONTINUED | OUTPATIENT
Start: 2019-08-16 | End: 2019-08-16 | Stop reason: HOSPADM

## 2019-08-16 RX ORDER — MIDAZOLAM HYDROCHLORIDE 1 MG/ML
INJECTION INTRAMUSCULAR; INTRAVENOUS AS NEEDED
Status: DISCONTINUED | OUTPATIENT
Start: 2019-08-16 | End: 2019-08-16 | Stop reason: SURG

## 2019-08-16 RX ORDER — PROPOFOL 10 MG/ML
VIAL (ML) INTRAVENOUS AS NEEDED
Status: DISCONTINUED | OUTPATIENT
Start: 2019-08-16 | End: 2019-08-16 | Stop reason: SURG

## 2019-08-16 RX ADMIN — PROPOFOL 30 MG: 10 INJECTION, EMULSION INTRAVENOUS at 10:55

## 2019-08-16 RX ADMIN — MIDAZOLAM HYDROCHLORIDE 1 MG: 2 INJECTION, SOLUTION INTRAMUSCULAR; INTRAVENOUS at 10:46

## 2019-08-16 RX ADMIN — LIDOCAINE HYDROCHLORIDE 70 MG: 20 INJECTION, SOLUTION EPIDURAL; INFILTRATION; INTRACAUDAL; PERINEURAL at 10:44

## 2019-08-16 RX ADMIN — PROPOFOL 150 MG: 10 INJECTION, EMULSION INTRAVENOUS at 10:46

## 2019-08-16 RX ADMIN — DEXTROSE AND SODIUM CHLORIDE 30 ML/HR: 5; 450 INJECTION, SOLUTION INTRAVENOUS at 10:09

## 2019-08-16 RX ADMIN — PROPOFOL 50 MG: 10 INJECTION, EMULSION INTRAVENOUS at 10:47

## 2019-08-16 RX ADMIN — MIDAZOLAM HYDROCHLORIDE 1 MG: 2 INJECTION, SOLUTION INTRAMUSCULAR; INTRAVENOUS at 10:43

## 2019-08-16 RX ADMIN — PROPOFOL 50 MG: 10 INJECTION, EMULSION INTRAVENOUS at 10:50

## 2019-08-16 NOTE — ANESTHESIA POSTPROCEDURE EVALUATION
Patient: Marguerite Rob    Procedure Summary     Date:  08/16/19 Room / Location:  [facility] ENDOSCOPY 1 / [facility] ENDOSCOPY    Anesthesia Start:  1042 Anesthesia Stop:  1105    Procedures:       ESOPHAGOGASTRODUODENOSCOPY (N/A )      COLONOSCOPY (N/A ) Diagnosis:       Gastroesophageal reflux disease with esophagitis      Pain of upper abdomen      Full incontinence of feces      Nausea      Iron deficiency anemia due to chronic blood loss      (Gastroesophageal reflux disease with esophagitis [K21.0])      (Pain of upper abdomen [R10.10])      (Full incontinence of feces [R15.9])      (Nausea [R11.0])      (Iron deficiency anemia due to chronic blood loss [D50.0])    Surgeon:  Kendrick Ruiz MD Provider:  Claude Marte CRNA    Anesthesia Type:  MAC ASA Status:  3          Anesthesia Type: MAC  Last vitals  BP   119/61 (08/16/19 0954)   Temp   96.7 °F (35.9 °C) (08/16/19 0954)   Pulse   94 (08/16/19 0954)   Resp   18 (08/16/19 0954)     SpO2   97 % (08/16/19 0954)     Post Anesthesia Care and Evaluation    Patient location during evaluation: bedside  Patient participation: complete - patient participated  Level of consciousness: awake  Pain score: 0  Pain management: adequate  Airway patency: patent  Anesthetic complications: No anesthetic complications  PONV Status: none  Cardiovascular status: acceptable and hemodynamically stable  Respiratory status: acceptable and spontaneous ventilation  Hydration status: acceptable

## 2019-08-16 NOTE — H&P
No chief complaint on file.      ENDO PROCEDURE ORDERED: EGD/COLON nausea, gerd, abd pain, COLON anemia, fecal incontinence    Subjective    Margueritejuan luis Rob is a 59 y.o. female. she is here today for follow-up.    History of Present Illness    The patient is seen on a recheck of her history of chronic active hepatitis C, TORRES, GERD, elevated liver enzymes, abdominal pain. Last seen 02/04/2019. Genotype 2B/F0. She had completed 8 weeks of MAVYRET on 04/16/2018.    Laboratories on 02/04/2019, HCV RNA by PCR quantitative was not detected. HCV FibroSure 0.03/F0, 010/A0. Normal INR, CBC. CMP showed glucose 132, otherwise normal. She states she is scheduled for surgery on her neck in [place] in the next few weeks. She cancelled her appointment on 02/25/2019 with us. She same-day cancelled on 04/11/2019, she cancelled again on 04/24/2019.    The patient had laboratories on 04/30/2019, chest x-ray showed infiltrate in the right lung with some chronic changes. She had an E. Coli UTI. Normal PT, PTT. CMP showed glucose 186, CO2 of 22, alkaline phosphatase 127, otherwise normal. CBC showed hemoglobin of 11.3, hematocrit of 34.5.    The patient states she has been having a lot more nausea. She was waking up with a lot of regurgitation on the Prilosec, so her family doctor switched her to Protonix. She thinks it is doing a little bit better, but she states she will have a foul taste in her mouth all the time. She denies any sinus or dental issues. She states he has to brush her teeth multiple times a day to try to control the smell. She states she is having some increasing dysphagia, was previously doing better. She has nausea, but denies vomiting. She states she will be incontinent of stool at least 1-2 times per week. It has been going on for about a year. She does not know when it is going to happen. She just soils herself. Her weight is down 10.6 pounds since last visit. Last EGD/colonoscopy 02/08/2016 showed esophagitis,  gastritis, hemorrhoids.    ASSESSMENT/PLAN: High-risk noncompliance. Patient with increased GERD, dysphagia, possible bacterial overgrowth, recommend EGD with possible dilatation. We will also schedule her for colonoscopy. Given her alkaline phosphatase elevation, I suggested an ARISTIDES, AMA, SMA, will need to evaluate for possible PVC. She does have some anemia and will check a colonoscopy given her incontinence, anemia and abdominal pain. She states she is going to have surgery within the next 2 weeks and may need to defer her endoscopy until she has recovered from her surgery. We will plan follow up in 6-8 weeks, further pending clinical course and the results of the above.       The following portions of the patient's history were reviewed and updated as appropriate:   Past Medical History:   Diagnosis Date   • Alcoholic fatty liver    • Alkaline phosphatase raised    • Anxiety    • Asthma     IgE-MEDIATED ALLERGIC ASTHMA   • Backache     CHRONIC   • CHF (congestive heart failure) (CMS/HCC)    • Chronic hepatitis C (CMS/HCC)     2b. Fibrosure .05/F0, necroinflam .14/A0. Repeat .05/F0, .13/A0      • Chronic neck pain    • Constipation    • COPD (chronic obstructive pulmonary disease) (CMS/HCC)    • Diabetes (CMS/HCC)    • Diarrhea    • Elevated levels of transaminase & lactic acid dehydrogenase    • Emphysema, unspecified (CMS/HCC)    • Generalized abdominal pain    • GERD (gastroesophageal reflux disease)     WITH ESOPHAGITIS   • Hepatitis    • High risk sexual behavior    • Hypertension    • Hypokalemia    • Irritable bowel syndrome (IBS)    • Multiple joint pain    • Need for vaccination    • Numbness and tingling in left arm    • On long term drug therapy    • Shoulder pain    • Tobacco dependence syndrome      Past Surgical History:   Procedure Laterality Date   • ANTERIOR CERVICAL DISCECTOMY W/ FUSION N/A 5/13/2019    Procedure: EXPLORATION OF FUSION C5-6, ANTERIOR CERVICAL DISCECTOMY FUSION C3-5 WITH  INSTRUMENTATION C3-6;  Surgeon: YINKA Sanders MD;  Location:  [facility] OR;  Service: Orthopedic Spine   • APPENDECTOMY     • CERVICAL FUSION  1999    C5-6   • CHOLECYSTECTOMY     • COLONOSCOPY  02/08/2016   • COLONOSCOPY W/ POLYPECTOMY  02/08/2016    External and internal hemorrhoids.The examination was otherwise normal.Fluid aspiration performed.Several biopsies obtained in the entire colon.   • ENDOSCOPY  02/08/2016    Mildly severe esophagitis.Gastritis.Normal examined duodenum.Several biopsies obtained in the lower third of the esophagus.Several biopsies obtained in the gastric antrum.Several biopsies obtained in the first part of the duodenum.   • ENDOSCOPY  08/13/2012    Esophagitis seen. Biopsy taken. Gastritis in stomach. Biopsy taken. Normal duodenum. Biopsy taken.   • ENDOSCOPY AND COLONOSCOPY  08/13/2012    Internal & external hemorrhoids found. Scope could not pass into the TI.   • FINGER SURGERY Left 04/2019    thumb - piece of metal removed   • HARDWARE REMOVAL N/A 5/13/2019    Procedure: REMOVAL OF INSTRUMENTATION;  Surgeon: YINKA Sanders MD;  Location:  [facility];  Service: Orthopedic Spine   • HEMORRHOIDECTOMY     • HERNIA REPAIR      pt states no hernia was found so no procedure was done so they closed her back up   • INJECTION OF MEDICATION  08/01/2013    METHYLPREDNISONE, X2   • INJECTION OF MEDICATION  11/03/2011    KENALOG   • INJECTION OF MEDICATION  02/24/2011    ROCEPHIN   • KNEE SURGERY Right    • TONSILLECTOMY AND ADENOIDECTOMY     • TOTAL ABDOMINAL HYSTERECTOMY WITH SALPINGO OOPHORECTOMY     • UPPER GASTROINTESTINAL ENDOSCOPY  02/08/2016     Family History   Problem Relation Age of Onset   • Coronary artery disease Mother    • Diabetes Mother    • Heart failure Mother    • Cancer Father    • Diabetes Father    • Heart failure Father    • Thyroid disease Father    • Breast cancer Sister    • Cancer Other         COLORECTAL   • Endometrial cancer Other    • Ovarian cancer Other    •  "Diabetes Brother      OB History     No data available        Allergies   Allergen Reactions   • Doxycycline Itching   • Penicillins Rash     Social History     Socioeconomic History   • Marital status:      Spouse name: Not on file   • Number of children: Not on file   • Years of education: Not on file   • Highest education level: Not on file   Tobacco Use   • Smoking status: Current Every Day Smoker     Packs/day: 0.25     Years: 45.00     Pack years: 11.25     Types: Cigarettes   • Smokeless tobacco: Never Used   Substance and Sexual Activity   • Alcohol use: No   • Drug use: No   • Sexual activity: Defer       Current Facility-Administered Medications:   •  dextrose 5 % and sodium chloride 0.45 % infusion, 30 mL/hr, Intravenous, Continuous PRN, Genaro Franco PA-C  Review of Systems  Review of Systems   Constitutional: Positive for fatigue and unexpected weight change. Negative for appetite change.   HENT: Positive for trouble swallowing.    Gastrointestinal: Positive for abdominal pain, diarrhea and nausea.          Objective    Ht 160 cm (63\")   Wt 68 kg (150 lb)   BMI 26.57 kg/m²   Physical Exam   Constitutional: She is oriented to person, place, and time. She appears well-developed and well-nourished. No distress.   HENT:   Head: Normocephalic and atraumatic.   Eyes: EOM are normal. Pupils are equal, round, and reactive to light.   Neck: Normal range of motion.   Cardiovascular: Normal rate, regular rhythm and normal heart sounds.   Pulmonary/Chest: Effort normal and breath sounds normal.   Abdominal: Soft. Bowel sounds are normal. She exhibits no shifting dullness, no distension, no abdominal bruit, no ascites and no mass. There is no hepatosplenomegaly. There is tenderness. There is no rigidity, no rebound, no guarding and no CVA tenderness. No hernia. Hernia confirmed negative in the ventral area.   Mild diffuse   Musculoskeletal: Normal range of motion.   Neurological: She is alert and " oriented to person, place, and time.   Skin: Skin is warm and dry.   Psychiatric: She has a normal mood and affect. Her behavior is normal. Judgment and thought content normal.   Nursing note and vitals reviewed.    Assessment/Plan      1. Gastroesophageal reflux disease with esophagitis    2. Pain of upper abdomen    3. Full incontinence of feces    4. Nausea    5. Iron deficiency anemia due to chronic blood loss    .   Marguerite was seen today for hepatitis c, richards, heartburn and elevated hepatic enzymes.    Diagnoses and all orders for this visit:    Alkaline phosphatase elevation  -     Nuclear Antigen Antibody, IFA  -     Mitochondrial Antibodies, M2  -     Anti-Smooth Muscle Antibody Titer    Gastroesophageal reflux disease with esophagitis  -     Case Request; Standing  -     dextrose 5 % and sodium chloride 0.45 % infusion  -     Case Request    Pain of upper abdomen  -     Case Request; Standing  -     dextrose 5 % and sodium chloride 0.45 % infusion  -     Case Request    Full incontinence of feces  -     Case Request; Standing  -     dextrose 5 % and sodium chloride 0.45 % infusion  -     Case Request    Nausea  -     Case Request; Standing  -     dextrose 5 % and sodium chloride 0.45 % infusion  -     Case Request    Iron deficiency anemia due to chronic blood loss  -     Case Request; Standing  -     dextrose 5 % and sodium chloride 0.45 % infusion  -     Case Request    Hepatic fibrosis  -     Nuclear Antigen Antibody, IFA  -     Mitochondrial Antibodies, M2  -     Anti-Smooth Muscle Antibody Titer    Other orders  -     Follow Anesthesia Guidelines / Standing Orders; Future  -     Obtain Informed Consent; Future  -     Obtain Informed Consent; Standing  -     POC Glucose Once; Standing        Orders placed during this encounter include:  Orders Placed This Encounter   Procedures   • Obtain Informed Consent     Standing Status:   Standing     Number of Occurrences:   1     Order Specific Question:    Informed Consent Given For     Answer:   ESOPHAGOGASTRODUODENOSCOPY and colonoscopy   • POC Glucose Once     Prior to Procedure on ALL Diabetic Patients     Standing Status:   Standing     Number of Occurrences:   1   • Insert Peripheral IV     Standing Status:   Standing     Number of Occurrences:   1       Medications prescribed:  New Medications Ordered This Visit   Medications   • dextrose 5 % and sodium chloride 0.45 % infusion     Discontinued Medications       Reason for Discontinue    famotidine (PEPCID) 40 MG tablet Dose adjustment        Requested Prescriptions      No prescriptions requested or ordered in this encounter       Review and/or summary of lab tests, radiology, procedures, medications. Review and summary of old records and obtaining of history. The risks and benefits of my recommendations, as well as other treatment options were discussed with the patient today. Questions were answered.    Follow-up: No Follow-up on file.     ESOPHAGOGASTRODUODENOSCOPY (N/A), COLONOSCOPY (N/A)      This document has been electronically signed by Kendrick Ruiz MD on August 16, 2019 9:47 AM      Results for orders placed or performed during the hospital encounter of 05/13/19   CBC Auto Differential   Result Value Ref Range    WBC 9.24 4.80 - 10.80 10*3/mm3    RBC 4.30 4.20 - 5.40 10*6/mm3    Hemoglobin 11.2 (L) 12.0 - 16.0 g/dL    Hematocrit 34.7 (L) 37.0 - 47.0 %    MCV 80.7 (L) 82.0 - 98.0 fL    MCH 26.0 (L) 28.0 - 32.0 pg    MCHC 32.3 (L) 33.0 - 36.0 g/dL    RDW 14.4 12.0 - 15.0 %    RDW-SD 42.4 40.0 - 54.0 fl    MPV 11.5 6.0 - 12.0 fL    Platelets 284 130 - 400 10*3/mm3    Neutrophil % 72.2 39.0 - 78.0 %    Lymphocyte % 19.2 15.0 - 45.0 %    Monocyte % 7.0 4.0 - 12.0 %    Eosinophil % 0.8 0.0 - 4.0 %    Basophil % 0.4 0.0 - 2.0 %    Immature Grans % 0.4 0.0 - 5.0 %    Neutrophils, Absolute 6.67 1.87 - 8.40 10*3/mm3    Lymphocytes, Absolute 1.77 0.72 - 4.86 10*3/mm3    Monocytes, Absolute 0.65 0.19 - 1.30  10*3/mm3    Eosinophils, Absolute 0.07 0.00 - 0.70 10*3/mm3    Basophils, Absolute 0.04 0.00 - 0.20 10*3/mm3    Immature Grans, Absolute 0.04 0.00 - 0.05 10*3/mm3    nRBC 0.0 0.0 - 0.2 /100 WBC   POC Glucose Once   Result Value Ref Range    Glucose 255 (H) 70 - 130 mg/dL   POC Glucose Once   Result Value Ref Range    Glucose 221 (H) 70 - 130 mg/dL   POC Glucose Once   Result Value Ref Range    Glucose 201 (H) 70 - 130 mg/dL   POC Glucose Once   Result Value Ref Range    Glucose 295 (H) 70 - 130 mg/dL   POC Glucose Once   Result Value Ref Range    Glucose 381 (H) 70 - 130 mg/dL   POC Glucose Once   Result Value Ref Range    Glucose 465 (C) 70 - 130 mg/dL   POC Glucose Once   Result Value Ref Range    Glucose 430 (H) 70 - 130 mg/dL   POC Glucose Once   Result Value Ref Range    Glucose 310 (H) 70 - 130 mg/dL   POC Glucose Once   Result Value Ref Range    Glucose 226 (H) 70 - 130 mg/dL   POC Glucose Once   Result Value Ref Range    Glucose 277 (H) 70 - 130 mg/dL   Type & Screen   Result Value Ref Range    ABO Type O     RH type Negative     Antibody Screen Negative     T&S Expiration Date 5/16/2019 11:59:59 PM    Basic Metabolic Panel   Result Value Ref Range    Glucose 211 (H) 70 - 100 mg/dL    BUN 13 5 - 21 mg/dL    Creatinine 0.60 0.50 - 1.40 mg/dL    Sodium 137 135 - 145 mmol/L    Potassium 4.1 3.5 - 5.3 mmol/L    Chloride 97 (L) 98 - 110 mmol/L    CO2 28.0 24.0 - 31.0 mmol/L    Calcium 9.6 8.4 - 10.4 mg/dL    eGFR Non African Amer 102 >60 mL/min/1.73    BUN/Creatinine Ratio 21.7 7.0 - 25.0    Anion Gap 12.0 4.0 - 13.0 mmol/L   Results for orders placed or performed in visit on 04/30/19   Urinalysis, Microscopic Only - Urine, Clean Catch   Result Value Ref Range    RBC, UA 3-5 (A) None Seen /HPF    WBC, UA 13-20 (A) None Seen /HPF    Bacteria, UA 4+ (A) None Seen /HPF    Squamous Epithelial Cells, UA 3-6 (A) None Seen, 0-2 /HPF    Hyaline Casts, UA 3-6 None Seen /LPF    Methodology Automated Microscopy     Urinalysis With Culture If Indicated - Urine, Clean Catch   Result Value Ref Range    Color, UA Yellow Yellow, Straw    Appearance, UA Clear Clear    pH, UA <=5.0 5.0 - 8.0    Specific Gravity, UA 1.012 1.005 - 1.030    Glucose, UA Negative Negative    Ketones, UA Negative Negative    Bilirubin, UA Negative Negative    Blood, UA Negative Negative    Protein, UA Negative Negative    Leuk Esterase, UA Large (3+) (A) Negative    Nitrite, UA Positive (A) Negative    Urobilinogen, UA 0.2 E.U./dL 0.2 - 1.0 E.U./dL   CBC Auto Differential   Result Value Ref Range    WBC 7.72 4.80 - 10.80 10*3/mm3    RBC 4.39 4.20 - 5.40 10*6/mm3    Hemoglobin 11.3 (L) 12.0 - 16.0 g/dL    Hematocrit 34.5 (L) 37.0 - 47.0 %    MCV 78.6 (L) 82.0 - 98.0 fL    MCH 25.7 (L) 28.0 - 32.0 pg    MCHC 32.8 (L) 33.0 - 36.0 g/dL    RDW 14.9 12.0 - 15.0 %    RDW-SD 42.6 40.0 - 54.0 fl    MPV 11.2 6.0 - 12.0 fL    Platelets 335 130 - 400 10*3/mm3    Neutrophil % 62.4 39.0 - 78.0 %    Lymphocyte % 27.2 15.0 - 45.0 %    Monocyte % 6.6 4.0 - 12.0 %    Eosinophil % 2.7 0.0 - 4.0 %    Basophil % 0.6 0.0 - 2.0 %    Immature Grans % 0.5 0.0 - 5.0 %    Neutrophils, Absolute 4.81 1.87 - 8.40 10*3/mm3    Lymphocytes, Absolute 2.10 0.72 - 4.86 10*3/mm3    Monocytes, Absolute 0.51 0.19 - 1.30 10*3/mm3    Eosinophils, Absolute 0.21 0.00 - 0.70 10*3/mm3    Basophils, Absolute 0.05 0.00 - 0.20 10*3/mm3    Immature Grans, Absolute 0.04 0.00 - 0.05 10*3/mm3    nRBC 0.0 0.0 - 0.2 /100 WBC   aPTT   Result Value Ref Range    PTT 32.9 24.1 - 35.0 seconds   Protime-INR   Result Value Ref Range    Protime 13.5 11.9 - 14.6 Seconds    INR 1.00 0.91 - 1.09   Urine Culture - Urine, Urine, Clean Catch   Result Value Ref Range    Urine Culture >100,000 CFU/mL Escherichia coli (A)        Susceptibility    Escherichia coli - DANNY     Ampicillin >=32 Resistant ug/ml     Ampicillin + Sulbactam >=32 Resistant ug/ml     Cefazolin* 16 Intermediate ug/ml      * Cefazolin results may be used  to predict the potential effectiveness of oral cephalosporins for treating uncomplicated urinary tract infections.     Cefepime <=1 Susceptible ug/ml     Ceftriaxone <=1 Susceptible ug/ml     Ertapenem <=0.5 Susceptible ug/ml     ESBL Confirmation Test NEG Negative ug/ml     Gentamicin >=16 Resistant ug/ml     Levofloxacin <=0.12 Susceptible ug/ml     Meropenem <=0.25 Susceptible ug/ml     Nitrofurantoin <=16 Susceptible ug/ml     Piperacillin + Tazobactam <=4 Susceptible ug/ml     Tobramycin 8 Intermediate ug/ml     Trimethoprim + Sulfamethoxazole >=320 Resistant ug/ml   Comprehensive Metabolic Panel   Result Value Ref Range    Glucose 186 (H) 70 - 100 mg/dL    BUN 16 5 - 21 mg/dL    Creatinine 0.59 0.50 - 1.40 mg/dL    Sodium 139 135 - 145 mmol/L    Potassium 4.1 3.5 - 5.3 mmol/L    Chloride 102 98 - 110 mmol/L    CO2 22.0 (L) 24.0 - 31.0 mmol/L    Calcium 10.3 8.4 - 10.4 mg/dL    Total Protein 7.8 6.3 - 8.7 g/dL    Albumin 4.50 3.50 - 5.00 g/dL    ALT (SGPT) <15 0 - 54 U/L    AST (SGOT) 20 7 - 45 U/L    Alkaline Phosphatase 127 (H) 24 - 120 U/L    Total Bilirubin 0.3 0.1 - 1.0 mg/dL    eGFR Non African Amer 104 >60 mL/min/1.73    Globulin 3.3 gm/dL    A/G Ratio 1.4 1.1 - 2.5 g/dL    BUN/Creatinine Ratio 27.1 (H) 7.0 - 25.0    Anion Gap 15.0 (H) 4.0 - 13.0 mmol/L     *Note: Due to a large number of results and/or encounters for the requested time period, some results have not been displayed. A complete set of results can be found in Results Review.       Some portions of this note have been dictated using voice recognition software and may contain errors and/or omissions.

## 2019-08-16 NOTE — ANESTHESIA PREPROCEDURE EVALUATION
Anesthesia Evaluation     NPO Solid Status: > 8 hours  NPO Liquid Status: > 2 hours           Airway   Mallampati: II  TM distance: >3 FB  Neck ROM: limited  Possible difficult intubation  Dental - normal exam     Pulmonary    (+) a smoker Current Smoked day of surgery, COPD moderate, asthma, rhonchi, wheezes,   Cardiovascular     Rhythm: regular  Rate: normal    (+) hypertension, CHF,       Neuro/Psych  (+) psychiatric history Anxiety,     GI/Hepatic/Renal/Endo    (+)  GERD,  hepatitis C, liver disease fatty liver disease, diabetes mellitus type 2 poorly controlled,     Musculoskeletal     Abdominal    Substance History      OB/GYN          Other                      Anesthesia Plan    ASA 3     MAC     Anesthetic plan, all risks, benefits, and alternatives have been provided, discussed and informed consent has been obtained with: patient.    Plan discussed with CRNA.

## 2019-08-19 LAB
LAB AP CASE REPORT: NORMAL
PATH REPORT.FINAL DX SPEC: NORMAL
PATH REPORT.GROSS SPEC: NORMAL

## 2019-09-11 ENCOUNTER — OFFICE VISIT (OUTPATIENT)
Dept: GASTROENTEROLOGY | Facility: CLINIC | Age: 59
End: 2019-09-11

## 2019-09-11 VITALS
HEIGHT: 63 IN | WEIGHT: 156 LBS | SYSTOLIC BLOOD PRESSURE: 122 MMHG | DIASTOLIC BLOOD PRESSURE: 74 MMHG | BODY MASS INDEX: 27.64 KG/M2 | HEART RATE: 107 BPM

## 2019-09-11 DIAGNOSIS — R74.8 ALKALINE PHOSPHATASE ELEVATION: ICD-10-CM

## 2019-09-11 DIAGNOSIS — K74.00 HEPATIC FIBROSIS: ICD-10-CM

## 2019-09-11 DIAGNOSIS — R10.10 PAIN OF UPPER ABDOMEN: ICD-10-CM

## 2019-09-11 DIAGNOSIS — K21.00 GASTROESOPHAGEAL REFLUX DISEASE WITH ESOPHAGITIS: Primary | ICD-10-CM

## 2019-09-11 PROCEDURE — 99214 OFFICE O/P EST MOD 30 MIN: CPT | Performed by: PHYSICIAN ASSISTANT

## 2019-09-11 RX ORDER — HYDROCODONE BITARTRATE AND ACETAMINOPHEN 7.5; 325 MG/1; MG/1
1 TABLET ORAL 3 TIMES DAILY
COMMUNITY

## 2019-09-11 RX ORDER — FLUCONAZOLE 100 MG/1
100 TABLET ORAL ONCE
COMMUNITY

## 2019-09-11 RX ORDER — OMEPRAZOLE 40 MG/1
40 CAPSULE, DELAYED RELEASE ORAL 2 TIMES DAILY
Qty: 60 CAPSULE | Refills: 3 | Status: SHIPPED | OUTPATIENT
Start: 2019-09-11 | End: 2019-11-11 | Stop reason: CLARIF

## 2019-09-11 NOTE — PATIENT INSTRUCTIONS

## 2019-09-11 NOTE — PROGRESS NOTES
Chief Complaint   Patient presents with   • Heartburn   • Nausea   • Anemia   • Hepatic Fibrosis       ENDO PROCEDURE ORDERED:    Subjective    Margueritejuan luis Rob is a 59 y.o. female. she is here today for follow-up.    History of Present Illness    Patient is seen on a recheck of her GERD, abdominal pain, hepatic fibrosis with history of hepatitis C. She completed Mavyret on 04/06/2018. Last seen 05/06/2019. She had her labs drawn at [facility] and we were told those results would not be back until next week. She had ARISTIDES, AMA, and SMA done. She has not had any other recent studies. She complains of 4 out of 10 midabdominal pain with nausea. She states sometimes it is quite severe despite Pepcid, Reglan, and Protonix. She is on Senna for her constipation. Weight is up 3.4 pounds since last visit. Last EGD/colonoscopy 02/18/2016 showed esophagitis, gastritis, and hemorrhoids.     The patient underwent EGD/colonoscopy on 08/16/2019 that showed esophagitis, diffuse gastritis. Distal esophageal biopsy showed reactive change. Antral biopsy reactive gastropathy. Colonoscopy showed external/internal hemorrhoids. Random colon biopsy negative. Recommended repeat colonoscopy in 5 years.     ASSESSMENT AND PLAN: Patient with significant esophagitis and gastritis with persistent nausea and GERD symptoms, not well-controlled on current regimen. We will try switching her to Prilosec 40 mg b.i.d. She was encouraged to avoid gastric irritants. Encouraged dietary modification and weight loss. We will plan followup in 2 months with TORRES FibroSure, LFTs, and INR prior. Her other studies are still pending.        The following portions of the patient's history were reviewed and updated as appropriate:   Past Medical History:   Diagnosis Date   • Alcoholic fatty liver    • Alkaline phosphatase raised    • Anxiety    • Asthma     IgE-MEDIATED ALLERGIC ASTHMA   • Backache     CHRONIC   • CHF (congestive heart failure)  (CMS/HCC)    • Chronic hepatitis C (CMS/HCC)     2b. Fibrosure .05/F0, necroinflam .14/A0. Repeat .05/F0, .13/A0      • Chronic neck pain    • Constipation    • COPD (chronic obstructive pulmonary disease) (CMS/HCC)    • Diabetes (CMS/HCC)    • Diarrhea    • Elevated levels of transaminase & lactic acid dehydrogenase    • Emphysema, unspecified (CMS/HCC)    • Generalized abdominal pain    • GERD (gastroesophageal reflux disease)     WITH ESOPHAGITIS   • Hepatitis    • High risk sexual behavior    • Hypertension    • Hypokalemia    • Irritable bowel syndrome (IBS)    • Multiple joint pain    • Need for vaccination    • Numbness and tingling in left arm    • On long term drug therapy    • Shoulder pain    • Tobacco dependence syndrome      Past Surgical History:   Procedure Laterality Date   • ANTERIOR CERVICAL DISCECTOMY W/ FUSION N/A 5/13/2019    Procedure: EXPLORATION OF FUSION C5-6, ANTERIOR CERVICAL DISCECTOMY FUSION C3-5 WITH INSTRUMENTATION C3-6;  Surgeon: YINKA Sanders MD;  Location: [facility] OR;  Service: Orthopedic Spine   • APPENDECTOMY     • CERVICAL FUSION  1999    C5-6   • CHOLECYSTECTOMY     • COLONOSCOPY  02/08/2016   • COLONOSCOPY N/A 8/16/2019    Procedure: COLONOSCOPY;  Surgeon: Kendrick Ruiz MD;  Location: [facility] ENDOSCOPY;  Service: Gastroenterology   • COLONOSCOPY W/ POLYPECTOMY  02/08/2016    External and internal hemorrhoids.The examination was otherwise normal.Fluid aspiration performed.Several biopsies obtained in the entire colon.   • ENDOSCOPY  02/08/2016    Mildly severe esophagitis.Gastritis.Normal examined duodenum.Several biopsies obtained in the lower third of the esophagus.Several biopsies obtained in the gastric antrum.Several biopsies obtained in the first part of the duodenum.   • ENDOSCOPY  08/13/2012    Esophagitis seen. Biopsy taken. Gastritis in stomach. Biopsy taken. Normal duodenum. Biopsy taken.   • ENDOSCOPY N/A 8/16/2019    Procedure: ESOPHAGOGASTRODUODENOSCOPY;   Surgeon: Kendrick Ruiz MD;  Location: [facility] ENDOSCOPY;  Service: Gastroenterology   • ENDOSCOPY AND COLONOSCOPY  08/13/2012    Internal & external hemorrhoids found. Scope could not pass into the TI.   • FINGER SURGERY Left 04/2019    thumb - piece of metal removed   • HARDWARE REMOVAL N/A 5/13/2019    Procedure: REMOVAL OF INSTRUMENTATION;  Surgeon: YINKA Sanders MD;  Location:  [facility] OR;  Service: Orthopedic Spine   • HEMORRHOIDECTOMY     • HERNIA REPAIR      pt states no hernia was found so no procedure was done so they closed her back up   • INJECTION OF MEDICATION  08/01/2013    METHYLPREDNISONE, X2   • INJECTION OF MEDICATION  11/03/2011    KENALOG   • INJECTION OF MEDICATION  02/24/2011    ROCEPHIN   • KNEE SURGERY Right    • TONSILLECTOMY AND ADENOIDECTOMY     • TOTAL ABDOMINAL HYSTERECTOMY WITH SALPINGO OOPHORECTOMY     • UPPER GASTROINTESTINAL ENDOSCOPY  02/08/2016   • UPPER GASTROINTESTINAL ENDOSCOPY  08/16/2019     Family History   Problem Relation Age of Onset   • Coronary artery disease Mother    • Diabetes Mother    • Heart failure Mother    • Cancer Father    • Diabetes Father    • Heart failure Father    • Thyroid disease Father    • Breast cancer Sister    • Cancer Other         COLORECTAL   • Endometrial cancer Other    • Ovarian cancer Other    • Diabetes Brother      OB History     No data available        Allergies   Allergen Reactions   • Doxycycline Itching   • Penicillins Rash     Social History     Socioeconomic History   • Marital status:      Spouse name: Not on file   • Number of children: Not on file   • Years of education: Not on file   • Highest education level: Not on file   Tobacco Use   • Smoking status: Current Every Day Smoker     Packs/day: 0.25     Years: 45.00     Pack years: 11.25     Types: Cigarettes   • Smokeless tobacco: Never Used   Substance and Sexual Activity   • Alcohol use: No   • Drug use: No   • Sexual activity: Defer     Current  Medications:  Prior to Admission medications    Medication Sig Start Date End Date Taking? Authorizing Provider   amitriptyline (ELAVIL) 100 MG tablet Take 200 mg by mouth Every Night.   Yes Modesta Chaudhary MD   amLODIPine (NORVASC) 5 MG tablet Take 5 mg by mouth Daily.   Yes Modesta Chaudhary MD   cetirizine (zyrTEC) 10 MG tablet Take 10 mg by mouth Daily.   Yes Modesta Chaudhary MD   fluconazole (DIFLUCAN) 100 MG tablet Take 100 mg by mouth 1 (One) Time.   Yes Modesta Chaudhary MD   fluticasone (FLONASE) 50 MCG/ACT nasal spray 1 spray into each nostril Daily. 2/13/17  Yes Modesta Chaudhary MD   furosemide (LASIX) 20 MG tablet Take 20 mg by mouth Daily.   Yes Modesta Chaudhary MD   gemfibrozil (LOPID) 600 MG tablet Take 600 mg by mouth 2 (Two) Times a Day Before Meals.   Yes Modesta Chaudhary MD   glyBURIDE (DIAbeta) 2.5 MG tablet Take 2.5 mg by mouth 2 (Two) Times a Day With Meals.   Yes Modesta Chaudhary MD   HYDROcodone-acetaminophen (NORCO) 7.5-325 MG per tablet Take 1 tablet by mouth 3 (Three) Times a Day.   Yes Modesta Chaudhary MD   Insulin Glargine (BASAGLAR KWIKPEN) 100 UNIT/ML injection pen Inject 29 Units under the skin into the appropriate area as directed Every Night.   Yes Modesta Chaudhary MD   JANUMET XR  MG tablet sustained-release 24 hour Take 1 tablet by mouth 2 (Two) Times a Day. 2/2/17  Yes Modesta Chaudhary MD   metoclopramide (REGLAN) 10 MG tablet Take 10 mg by mouth As Needed (nausea).   Yes Modesta Chaudhary MD   montelukast (SINGULAIR) 10 MG tablet Take 1 tablet by mouth Every Night. 2/23/17  Yes Denisse Marcano MD   potassium chloride (K-DUR,KLOR-CON) 10 MEQ CR tablet Take 10 mEq by mouth 2 (Two) Times a Day.   Yes Modesta Chaudhary MD   pravastatin (PRAVACHOL) 20 MG tablet Take 20 mg by mouth Every Night.   Yes Modesta Chaudhary MD   sennosides-docusate sodium (SENOKOT-S) 8.6-50 MG tablet Take 2 tablets by mouth Every  "Night.   Yes Provider, MD Modesta   famotidine (PEPCID) 20 MG tablet Take 20 mg by mouth Daily.  9/11/19 Yes Provider, MD Modesta   pantoprazole (PROTONIX) 40 MG EC tablet Take 40 mg by mouth Daily.  9/11/19 Yes Provider, MD Modesta   Fluticasone Furoate-Vilanterol (BREO ELLIPTA) 100-25 MCG/INH inhaler Inhale 1 puff Daily. 3/27/19   Denisse Marcano MD   omeprazole (priLOSEC) 40 MG capsule Take 1 capsule by mouth 2 (Two) Times a Day. 9/11/19   Genaro Franco, LEMUEL     Review of Systems  Review of Systems       Objective    /74 (BP Location: Left arm)   Pulse 107   Ht 160 cm (63\")   Wt 70.8 kg (156 lb)   BMI 27.63 kg/m²   Physical Exam   Constitutional: She is oriented to person, place, and time. She appears well-developed and well-nourished. No distress.   HENT:   Head: Normocephalic and atraumatic.   Eyes: EOM are normal. Pupils are equal, round, and reactive to light.   Neck: Normal range of motion.   Cardiovascular: Normal rate, regular rhythm and normal heart sounds.   Pulmonary/Chest: Effort normal and breath sounds normal.   Abdominal: Soft. Bowel sounds are normal. She exhibits no shifting dullness, no distension, no abdominal bruit, no ascites and no mass. There is no hepatosplenomegaly. There is tenderness. There is no rigidity, no rebound, no guarding and no CVA tenderness. No hernia. Hernia confirmed negative in the ventral area.   Mild diffuse   Musculoskeletal: Normal range of motion.   Neurological: She is alert and oriented to person, place, and time.   Skin: Skin is warm and dry.   Psychiatric: She has a normal mood and affect. Her behavior is normal. Judgment and thought content normal.   Nursing note and vitals reviewed.    Assessment/Plan      1. Gastroesophageal reflux disease with esophagitis    2. Pain of upper abdomen    3. Alkaline phosphatase elevation    4. Hepatic fibrosis    .   Marguerite was seen today for heartburn, nausea, anemia and hepatic fibrosis.    Diagnoses " and all orders for this visit:    Gastroesophageal reflux disease with esophagitis    Pain of upper abdomen  -     TORRES Fibrosure  -     Protime-INR  -     Hepatic Function Panel    Alkaline phosphatase elevation  -     TORRES Fibrosure  -     Protime-INR  -     Hepatic Function Panel    Hepatic fibrosis  -     TORRES Fibrosure  -     Protime-INR  -     Hepatic Function Panel    Other orders  -     omeprazole (priLOSEC) 40 MG capsule; Take 1 capsule by mouth 2 (Two) Times a Day.        Orders placed during this encounter include:  Orders Placed This Encounter   Procedures   • TORRES Fibrosure   • Protime-INR   • Hepatic Function Panel       Medications prescribed:  New Medications Ordered This Visit   Medications   • omeprazole (priLOSEC) 40 MG capsule     Sig: Take 1 capsule by mouth 2 (Two) Times a Day.     Dispense:  60 capsule     Refill:  3     Discontinued Medications       Reason for Discontinue    famotidine (PEPCID) 20 MG tablet Not Efficacious    pantoprazole (PROTONIX) 40 MG EC tablet Not Efficacious        Requested Prescriptions     Signed Prescriptions Disp Refills   • omeprazole (priLOSEC) 40 MG capsule 60 capsule 3     Sig: Take 1 capsule by mouth 2 (Two) Times a Day.       Review and/or summary of lab tests, radiology, procedures, medications. Review and summary of old records and obtaining of history. The risks and benefits of my recommendations, as well as other treatment options were discussed with the patient today. Questions were answered.    Follow-up: Return in about 2 months (around 11/11/2019), or if symptoms worsen or fail to improve, for lab prior.     * Surgery not found *      This document has been electronically signed by Genaro Franco PA-C on September 15, 2019 12:25 PM      Results for orders placed or performed during the hospital encounter of 08/16/19   Tissue Pathology Exam   Result Value Ref Range    Case Report       Surgical Pathology Report                         Case:  [id number]                                  Authorizing Provider:  Kendrick Ruiz MD        Collected:           08/16/2019 10:53 AM          Ordering Location:     [facility]             Received:            08/16/2019 01:47 PM                                 [place] [facility]                                                     Pathologist:           Geovanni Wilder MD                                                         Specimens:   1) - Gastric, Antrum                                                                                2) - Esophagus, Distal                                                                              3) - Large Intestine, colonic mucosa                                                       Final Diagnosis       1.  MUCOSA, ANTRUM OF STOMACH:  REACTIVE GASTROPATHY.    2.  MUCOSA, DISTAL ESOPHAGUS:  REACTIVE CHANGE OF SQUAMOUS MUCOSA.    3.  MUCOSA, COLON:  NO SIGNIFICANT HISTOLOGIC ABNORMALITY.      Gross Description       Received for examination are 3 containers, each of which have nodular bits of white soft tissue measuring 0.3-0.5 cc in aggregate.  All specimens are embedded as labeled.  1A antrum of stomach; 2A distal esophagus; 3A mucosa of colon.     Results for orders placed or performed during the hospital encounter of 05/13/19   CBC Auto Differential   Result Value Ref Range    WBC 9.24 4.80 - 10.80 10*3/mm3    RBC 4.30 4.20 - 5.40 10*6/mm3    Hemoglobin 11.2 (L) 12.0 - 16.0 g/dL    Hematocrit 34.7 (L) 37.0 - 47.0 %    MCV 80.7 (L) 82.0 - 98.0 fL    MCH 26.0 (L) 28.0 - 32.0 pg    MCHC 32.3 (L) 33.0 - 36.0 g/dL    RDW 14.4 12.0 - 15.0 %    RDW-SD 42.4 40.0 - 54.0 fl    MPV 11.5 6.0 - 12.0 fL    Platelets 284 130 - 400 10*3/mm3    Neutrophil % 72.2 39.0 - 78.0 %    Lymphocyte % 19.2 15.0 - 45.0 %    Monocyte % 7.0 4.0 - 12.0 %    Eosinophil % 0.8 0.0 - 4.0 %    Basophil % 0.4 0.0 - 2.0 %    Immature Grans % 0.4 0.0 - 5.0 %    Neutrophils, Absolute 6.67 1.87 -  8.40 10*3/mm3    Lymphocytes, Absolute 1.77 0.72 - 4.86 10*3/mm3    Monocytes, Absolute 0.65 0.19 - 1.30 10*3/mm3    Eosinophils, Absolute 0.07 0.00 - 0.70 10*3/mm3    Basophils, Absolute 0.04 0.00 - 0.20 10*3/mm3    Immature Grans, Absolute 0.04 0.00 - 0.05 10*3/mm3    nRBC 0.0 0.0 - 0.2 /100 WBC   POC Glucose Once   Result Value Ref Range    Glucose 255 (H) 70 - 130 mg/dL   POC Glucose Once   Result Value Ref Range    Glucose 221 (H) 70 - 130 mg/dL   POC Glucose Once   Result Value Ref Range    Glucose 201 (H) 70 - 130 mg/dL   POC Glucose Once   Result Value Ref Range    Glucose 295 (H) 70 - 130 mg/dL   POC Glucose Once   Result Value Ref Range    Glucose 381 (H) 70 - 130 mg/dL   POC Glucose Once   Result Value Ref Range    Glucose 465 (C) 70 - 130 mg/dL   POC Glucose Once   Result Value Ref Range    Glucose 430 (H) 70 - 130 mg/dL   POC Glucose Once   Result Value Ref Range    Glucose 310 (H) 70 - 130 mg/dL   POC Glucose Once   Result Value Ref Range    Glucose 226 (H) 70 - 130 mg/dL   POC Glucose Once   Result Value Ref Range    Glucose 277 (H) 70 - 130 mg/dL   Type & Screen   Result Value Ref Range    ABO Type O     RH type Negative     Antibody Screen Negative     T&S Expiration Date 5/16/2019 11:59:59 PM    Basic Metabolic Panel   Result Value Ref Range    Glucose 211 (H) 70 - 100 mg/dL    BUN 13 5 - 21 mg/dL    Creatinine 0.60 0.50 - 1.40 mg/dL    Sodium 137 135 - 145 mmol/L    Potassium 4.1 3.5 - 5.3 mmol/L    Chloride 97 (L) 98 - 110 mmol/L    CO2 28.0 24.0 - 31.0 mmol/L    Calcium 9.6 8.4 - 10.4 mg/dL    eGFR Non African Amer 102 >60 mL/min/1.73    BUN/Creatinine Ratio 21.7 7.0 - 25.0    Anion Gap 12.0 4.0 - 13.0 mmol/L   Results for orders placed or performed in visit on 04/30/19   Urinalysis, Microscopic Only - Urine, Clean Catch   Result Value Ref Range    RBC, UA 3-5 (A) None Seen /HPF    WBC, UA 13-20 (A) None Seen /HPF    Bacteria, UA 4+ (A) None Seen /HPF    Squamous Epithelial Cells, UA 3-6 (A)  None Seen, 0-2 /HPF    Hyaline Casts, UA 3-6 None Seen /LPF    Methodology Automated Microscopy    Urinalysis With Culture If Indicated - Urine, Clean Catch   Result Value Ref Range    Color, UA Yellow Yellow, Straw    Appearance, UA Clear Clear    pH, UA <=5.0 5.0 - 8.0    Specific Gravity, UA 1.012 1.005 - 1.030    Glucose, UA Negative Negative    Ketones, UA Negative Negative    Bilirubin, UA Negative Negative    Blood, UA Negative Negative    Protein, UA Negative Negative    Leuk Esterase, UA Large (3+) (A) Negative    Nitrite, UA Positive (A) Negative    Urobilinogen, UA 0.2 E.U./dL 0.2 - 1.0 E.U./dL   CBC Auto Differential   Result Value Ref Range    WBC 7.72 4.80 - 10.80 10*3/mm3    RBC 4.39 4.20 - 5.40 10*6/mm3    Hemoglobin 11.3 (L) 12.0 - 16.0 g/dL    Hematocrit 34.5 (L) 37.0 - 47.0 %    MCV 78.6 (L) 82.0 - 98.0 fL    MCH 25.7 (L) 28.0 - 32.0 pg    MCHC 32.8 (L) 33.0 - 36.0 g/dL    RDW 14.9 12.0 - 15.0 %    RDW-SD 42.6 40.0 - 54.0 fl    MPV 11.2 6.0 - 12.0 fL    Platelets 335 130 - 400 10*3/mm3    Neutrophil % 62.4 39.0 - 78.0 %    Lymphocyte % 27.2 15.0 - 45.0 %    Monocyte % 6.6 4.0 - 12.0 %    Eosinophil % 2.7 0.0 - 4.0 %    Basophil % 0.6 0.0 - 2.0 %    Immature Grans % 0.5 0.0 - 5.0 %    Neutrophils, Absolute 4.81 1.87 - 8.40 10*3/mm3    Lymphocytes, Absolute 2.10 0.72 - 4.86 10*3/mm3    Monocytes, Absolute 0.51 0.19 - 1.30 10*3/mm3    Eosinophils, Absolute 0.21 0.00 - 0.70 10*3/mm3    Basophils, Absolute 0.05 0.00 - 0.20 10*3/mm3    Immature Grans, Absolute 0.04 0.00 - 0.05 10*3/mm3    nRBC 0.0 0.0 - 0.2 /100 WBC   aPTT   Result Value Ref Range    PTT 32.9 24.1 - 35.0 seconds   Protime-INR   Result Value Ref Range    Protime 13.5 11.9 - 14.6 Seconds    INR 1.00 0.91 - 1.09   Urine Culture - Urine, Urine, Clean Catch   Result Value Ref Range    Urine Culture >100,000 CFU/mL Escherichia coli (A)        Susceptibility    Escherichia coli - DANNY     Ampicillin >=32 Resistant ug/ml     Ampicillin +  Sulbactam >=32 Resistant ug/ml     Cefazolin* 16 Intermediate ug/ml      * Cefazolin results may be used to predict the potential effectiveness of oral cephalosporins for treating uncomplicated urinary tract infections.     Cefepime <=1 Susceptible ug/ml     Ceftriaxone <=1 Susceptible ug/ml     Ertapenem <=0.5 Susceptible ug/ml     ESBL Confirmation Test NEG Negative ug/ml     Gentamicin >=16 Resistant ug/ml     Levofloxacin <=0.12 Susceptible ug/ml     Meropenem <=0.25 Susceptible ug/ml     Nitrofurantoin <=16 Susceptible ug/ml     Piperacillin + Tazobactam <=4 Susceptible ug/ml     Tobramycin 8 Intermediate ug/ml     Trimethoprim + Sulfamethoxazole >=320 Resistant ug/ml   Comprehensive Metabolic Panel   Result Value Ref Range    Glucose 186 (H) 70 - 100 mg/dL    BUN 16 5 - 21 mg/dL    Creatinine 0.59 0.50 - 1.40 mg/dL    Sodium 139 135 - 145 mmol/L    Potassium 4.1 3.5 - 5.3 mmol/L    Chloride 102 98 - 110 mmol/L    CO2 22.0 (L) 24.0 - 31.0 mmol/L    Calcium 10.3 8.4 - 10.4 mg/dL    Total Protein 7.8 6.3 - 8.7 g/dL    Albumin 4.50 3.50 - 5.00 g/dL    ALT (SGPT) <15 0 - 54 U/L    AST (SGOT) 20 7 - 45 U/L    Alkaline Phosphatase 127 (H) 24 - 120 U/L    Total Bilirubin 0.3 0.1 - 1.0 mg/dL    eGFR Non African Amer 104 >60 mL/min/1.73    Globulin 3.3 gm/dL    A/G Ratio 1.4 1.1 - 2.5 g/dL    BUN/Creatinine Ratio 27.1 (H) 7.0 - 25.0    Anion Gap 15.0 (H) 4.0 - 13.0 mmol/L     *Note: Due to a large number of results and/or encounters for the requested time period, some results have not been displayed. A complete set of results can be found in Results Review.       Some portions of this note have been dictated using voice recognition software and may contain errors and/or omissions.

## 2019-10-28 ENCOUNTER — TRANSCRIBE ORDERS (OUTPATIENT)
Dept: ADMINISTRATIVE | Facility: HOSPITAL | Age: 59
End: 2019-10-28

## 2019-10-28 DIAGNOSIS — M54.2 CERVICALGIA: Primary | ICD-10-CM

## 2019-11-06 ENCOUNTER — APPOINTMENT (OUTPATIENT)
Dept: CT IMAGING | Facility: HOSPITAL | Age: 59
End: 2019-11-06

## 2019-11-11 ENCOUNTER — OFFICE VISIT (OUTPATIENT)
Dept: GASTROENTEROLOGY | Facility: CLINIC | Age: 59
End: 2019-11-11

## 2019-11-11 VITALS
SYSTOLIC BLOOD PRESSURE: 142 MMHG | HEIGHT: 63 IN | DIASTOLIC BLOOD PRESSURE: 88 MMHG | WEIGHT: 157 LBS | BODY MASS INDEX: 27.82 KG/M2 | HEART RATE: 116 BPM

## 2019-11-11 DIAGNOSIS — K22.10 EROSIVE ESOPHAGITIS: ICD-10-CM

## 2019-11-11 DIAGNOSIS — K59.00 CONSTIPATION, UNSPECIFIED CONSTIPATION TYPE: ICD-10-CM

## 2019-11-11 DIAGNOSIS — R10.10 PAIN OF UPPER ABDOMEN: ICD-10-CM

## 2019-11-11 DIAGNOSIS — K74.00 HEPATIC FIBROSIS: ICD-10-CM

## 2019-11-11 DIAGNOSIS — R74.8 ALKALINE PHOSPHATASE ELEVATION: ICD-10-CM

## 2019-11-11 DIAGNOSIS — K21.00 GASTROESOPHAGEAL REFLUX DISEASE WITH ESOPHAGITIS: Primary | ICD-10-CM

## 2019-11-11 PROCEDURE — 99213 OFFICE O/P EST LOW 20 MIN: CPT | Performed by: PHYSICIAN ASSISTANT

## 2019-11-11 RX ORDER — PANTOPRAZOLE SODIUM 40 MG/1
40 TABLET, DELAYED RELEASE ORAL DAILY
COMMUNITY
End: 2019-11-11

## 2019-11-11 RX ORDER — FAMOTIDINE 20 MG/1
20 TABLET, FILM COATED ORAL DAILY
COMMUNITY
End: 2019-11-11

## 2019-11-11 RX ORDER — OMEPRAZOLE 40 MG/1
40 CAPSULE, DELAYED RELEASE ORAL 2 TIMES DAILY
Qty: 60 CAPSULE | Refills: 3 | Status: SHIPPED | OUTPATIENT
Start: 2019-11-11

## 2019-11-11 RX ORDER — INSULIN GLARGINE 100 [IU]/ML
38 INJECTION, SOLUTION SUBCUTANEOUS
COMMUNITY

## 2019-11-11 NOTE — PATIENT INSTRUCTIONS

## 2019-11-11 NOTE — PROGRESS NOTES
Chief Complaint   Patient presents with   • Heartburn   • Hepatic Fibrosis   • Alkaline Phosphatase Elevation       ENDO PROCEDURE ORDERED:    Subjective    Margueritejuan luis Rob is a 59 y.o. female. she is here today for follow-up.    History of Present Illness    Patient seen on a recheck of her severe GERD, hepatic fibrosis, elevated liver enzymes with history of hepatitis C. Last seen 09/11/2019. At that time I had attempted to increase her Prilosec. Apparently insurance refused despite her severe esophagitis. She is still on Protonix 40 mg daily, Reglan, Pepcid, but it is not controlling her reflux. She states it is still severe. I tried to give her Prilosec 40 mg b.i.d.. She has previously been on Prilosec 40 mg daily but it was not controlling her reflux and we had switched her to Protonix and Pepcid. She is not able to get Zantac at this time. She is on Senokot for her constipation. Weight is up 1 pound since last visit. She had prior esophagitis, gastritis, hemorrhoids in 2016. EGD/colonoscopy on 08/16/2019 showed esophagitis, gastritis, hemorrhoids.     Patient had laboratories at [facility] on 09/10/2019, showed normal ARISTIDES, AMA, SMA. Further laboratories at [facility] on 09/25/2019, CMP showed glucose 177, alkaline phosphatase 191, otherwise normal. A1c 7.2%. Cholesterol panel showed triglycerides 222. She states she is on insulin now.     ASSESSMENT/PLAN: Patient with elevated liver enzymes, consider primarily biliary cholangitis. Markers are negative. She continues to have severe heartburn. Will consider further imaging. Will try to get her Prilosec approved for 40 mg b.i.d. given her severe esophagitis. Suspect she does have some degree of TORRES. Encourage dietary modification and weight loss. Will plan followup in 6 weeks with TORRES FibroSure prior.  Will need to consider repeat imaging. Further pending clinical course and the results of the above.        The following portions of the patient's history were  reviewed and updated as appropriate:   Past Medical History:   Diagnosis Date   • Alcoholic fatty liver    • Alkaline phosphatase raised    • Anxiety    • Asthma     IgE-MEDIATED ALLERGIC ASTHMA   • Backache     CHRONIC   • CHF (congestive heart failure) (CMS/HCC)    • Chronic hepatitis C (CMS/HCC)     2b. Fibrosure .05/F0, necroinflam .14/A0. Repeat .05/F0, .13/A0      • Chronic neck pain    • Constipation    • COPD (chronic obstructive pulmonary disease) (CMS/HCC)    • Diabetes (CMS/HCC)    • Diarrhea    • Elevated levels of transaminase & lactic acid dehydrogenase    • Emphysema, unspecified (CMS/HCC)    • Generalized abdominal pain    • GERD (gastroesophageal reflux disease)     WITH ESOPHAGITIS   • Hepatitis    • High risk sexual behavior    • Hypertension    • Hypokalemia    • Irritable bowel syndrome (IBS)    • Multiple joint pain    • Need for vaccination    • Numbness and tingling in left arm    • On long term drug therapy    • Shoulder pain    • Tobacco dependence syndrome      Past Surgical History:   Procedure Laterality Date   • ANTERIOR CERVICAL DISCECTOMY W/ FUSION N/A 5/13/2019    Procedure: EXPLORATION OF FUSION C5-6, ANTERIOR CERVICAL DISCECTOMY FUSION C3-5 WITH INSTRUMENTATION C3-6;  Surgeon: YINKA Sanders MD;  Location: [facility] OR;  Service: Orthopedic Spine   • APPENDECTOMY     • CERVICAL FUSION  1999    C5-6   • CHOLECYSTECTOMY     • COLONOSCOPY  02/08/2016   • COLONOSCOPY N/A 8/16/2019    Procedure: COLONOSCOPY;  Surgeon: Kendrick Ruiz MD;  Location: [facility] ENDOSCOPY;  Service: Gastroenterology   • COLONOSCOPY W/ POLYPECTOMY  02/08/2016    External and internal hemorrhoids.The examination was otherwise normal.Fluid aspiration performed.Several biopsies obtained in the entire colon.   • ENDOSCOPY  02/08/2016    Mildly severe esophagitis.Gastritis.Normal examined duodenum.Several biopsies obtained in the lower third of the esophagus.Several biopsies obtained in the gastric antrum.Several  biopsies obtained in the first part of the duodenum.   • ENDOSCOPY  08/13/2012    Esophagitis seen. Biopsy taken. Gastritis in stomach. Biopsy taken. Normal duodenum. Biopsy taken.   • ENDOSCOPY N/A 8/16/2019    Procedure: ESOPHAGOGASTRODUODENOSCOPY;  Surgeon: Kendrick Ruiz MD;  Location: [facility] ENDOSCOPY;  Service: Gastroenterology   • ENDOSCOPY AND COLONOSCOPY  08/13/2012    Internal & external hemorrhoids found. Scope could not pass into the TI.   • FINGER SURGERY Left 04/2019    thumb - piece of metal removed   • HARDWARE REMOVAL N/A 5/13/2019    Procedure: REMOVAL OF INSTRUMENTATION;  Surgeon: YINKA Sanders MD;  Location:  [facility] OR;  Service: Orthopedic Spine   • HEMORRHOIDECTOMY     • HERNIA REPAIR      pt states no hernia was found so no procedure was done so they closed her back up   • INJECTION OF MEDICATION  08/01/2013    METHYLPREDNISONE, X2   • INJECTION OF MEDICATION  11/03/2011    KENALOG   • INJECTION OF MEDICATION  02/24/2011    ROCEPHIN   • KNEE SURGERY Right    • TONSILLECTOMY AND ADENOIDECTOMY     • TOTAL ABDOMINAL HYSTERECTOMY WITH SALPINGO OOPHORECTOMY     • UPPER GASTROINTESTINAL ENDOSCOPY  02/08/2016   • UPPER GASTROINTESTINAL ENDOSCOPY  08/16/2019     Family History   Problem Relation Age of Onset   • Coronary artery disease Mother    • Diabetes Mother    • Heart failure Mother    • Cancer Father    • Diabetes Father    • Heart failure Father    • Thyroid disease Father    • Breast cancer Sister    • Cancer Other         COLORECTAL   • Endometrial cancer Other    • Ovarian cancer Other    • Diabetes Brother      OB History     No data available        Allergies   Allergen Reactions   • Doxycycline Itching   • Penicillins Rash     Social History     Socioeconomic History   • Marital status:      Spouse name: Not on file   • Number of children: Not on file   • Years of education: Not on file   • Highest education level: Not on file   Tobacco Use   • Smoking status: Current  Every Day Smoker     Packs/day: 0.25     Years: 45.00     Pack years: 11.25     Types: Cigarettes   • Smokeless tobacco: Never Used   Substance and Sexual Activity   • Alcohol use: No   • Drug use: No   • Sexual activity: Defer     Current Medications:  Prior to Admission medications    Medication Sig Start Date End Date Taking? Authorizing Provider   amitriptyline (ELAVIL) 100 MG tablet Take 200 mg by mouth Every Night.   Yes Modesta Chaudhary MD   amLODIPine (NORVASC) 5 MG tablet Take 5 mg by mouth Daily.   Yes Modesta Chaudhary MD   cetirizine (zyrTEC) 10 MG tablet Take 10 mg by mouth Daily.   Yes Modesta Chaudhary MD   famotidine (PEPCID) 20 MG tablet Take 20 mg by mouth Daily.   Yes Modesta Chaudhary MD   fluconazole (DIFLUCAN) 100 MG tablet Take 100 mg by mouth 1 (One) Time.   Yes Modesta Chaudhary MD   fluticasone (FLONASE) 50 MCG/ACT nasal spray 1 spray into each nostril Daily. 2/13/17  Yes Modesta Chaudhary MD   Fluticasone Furoate-Vilanterol (BREO ELLIPTA) 100-25 MCG/INH inhaler Inhale 1 puff Daily. 3/27/19  Yes Denisse Marcano MD   furosemide (LASIX) 20 MG tablet Take 20 mg by mouth Daily.   Yes Modesta Chaudhary MD   gemfibrozil (LOPID) 600 MG tablet Take 600 mg by mouth 2 (Two) Times a Day Before Meals.   Yes Modesta Chaudhary MD   glyBURIDE (DIAbeta) 2.5 MG tablet Take 2.5 mg by mouth 2 (Two) Times a Day With Meals.   Yes Modesta Chaudhary MD   HYDROcodone-acetaminophen (NORCO) 7.5-325 MG per tablet Take 1 tablet by mouth 3 (Three) Times a Day.   Yes Modesta Chaudhary MD   Insulin Glargine (BASAGLAR KWIKPEN) 100 UNIT/ML injection pen Inject 35 Units under the skin into the appropriate area as directed.   Yes Modesta Chaudhary MD   JANUMET XR  MG tablet sustained-release 24 hour Take 1 tablet by mouth 2 (Two) Times a Day. 2/2/17  Yes Modesta Chaudhary MD   metoclopramide (REGLAN) 10 MG tablet Take 10 mg by mouth As Needed (nausea).   Yes Jet  "MD Modesta   montelukast (SINGULAIR) 10 MG tablet Take 1 tablet by mouth Every Night. 2/23/17  Yes Denisse Marcano MD   pantoprazole (PROTONIX) 40 MG EC tablet Take 40 mg by mouth Daily.   Yes Modesta Chaudhary MD   potassium chloride (K-DUR,KLOR-CON) 10 MEQ CR tablet Take 10 mEq by mouth 2 (Two) Times a Day.   Yes Modesta Chaudhary MD   pravastatin (PRAVACHOL) 20 MG tablet Take 20 mg by mouth Every Night.   Yes Modesta Chaudhary MD   sennosides-docusate sodium (SENOKOT-S) 8.6-50 MG tablet Take 2 tablets by mouth Every Night.   Yes Modesta Chaudhary MD   omeprazole (priLOSEC) 40 MG capsule Take 1 capsule by mouth 2 (Two) Times a Day. 9/11/19   Genaro Franco PA-C   Insulin Glargine (BASAGLAR KWIKPEN) 100 UNIT/ML injection pen Inject 29 Units under the skin into the appropriate area as directed Every Night.  11/11/19  ProviderModesta MD     Review of Systems  Review of Systems       Objective    /88 (BP Location: Left arm)   Pulse 116   Ht 160 cm (63\")   Wt 71.2 kg (157 lb)   BMI 27.81 kg/m²   Physical Exam   Constitutional: She is oriented to person, place, and time. She appears well-developed and well-nourished. No distress.   HENT:   Head: Normocephalic and atraumatic.   Eyes: EOM are normal. Pupils are equal, round, and reactive to light.   Neck: Normal range of motion.   Cardiovascular: Normal rate, regular rhythm and normal heart sounds.   Pulmonary/Chest: Effort normal and breath sounds normal.   Abdominal: Soft. Bowel sounds are normal. She exhibits no shifting dullness, no distension, no abdominal bruit, no ascites and no mass. There is no hepatosplenomegaly. There is tenderness. There is no rigidity, no rebound, no guarding and no CVA tenderness. No hernia. Hernia confirmed negative in the ventral area.   Mild diffuse   Musculoskeletal: Normal range of motion.   Neurological: She is alert and oriented to person, place, and time.   Skin: Skin is warm and dry. "   Psychiatric: She has a normal mood and affect. Her behavior is normal. Judgment and thought content normal.   Nursing note and vitals reviewed.    Assessment/Plan      1. Gastroesophageal reflux disease with esophagitis    2. Pain of upper abdomen    3. Alkaline phosphatase elevation    4. Hepatic fibrosis    5. Constipation, unspecified constipation type    6. Erosive esophagitis    .   Marguerite was seen today for heartburn, hepatic fibrosis and alkaline phosphatase elevation.    Diagnoses and all orders for this visit:    Gastroesophageal reflux disease with esophagitis    Pain of upper abdomen  -     TORRES Fibrosure; Future  -     Protime-INR; Future    Alkaline phosphatase elevation  -     TORRES Fibrosure; Future  -     Protime-INR; Future    Hepatic fibrosis  -     TORRES Fibrosure; Future  -     Protime-INR; Future    Constipation, unspecified constipation type    Erosive esophagitis    Other orders  -     omeprazole (priLOSEC) 40 MG capsule; Take 1 capsule by mouth 2 (Two) Times a Day.        Orders placed during this encounter include:  Orders Placed This Encounter   Procedures   • TORRES Fibrosure     Standing Status:   Future     Standing Expiration Date:   1/30/2020   • Protime-INR     Standing Status:   Future     Standing Expiration Date:   1/30/2020       Medications prescribed:  New Medications Ordered This Visit   Medications   • omeprazole (priLOSEC) 40 MG capsule     Sig: Take 1 capsule by mouth 2 (Two) Times a Day.     Dispense:  60 capsule     Refill:  3     Discontinued Medications       Reason for Discontinue    Insulin Glargine (BASAGLAR KWIKPEN) 100 UNIT/ML injection pen Duplicate order        Requested Prescriptions     Signed Prescriptions Disp Refills   • omeprazole (priLOSEC) 40 MG capsule 60 capsule 3     Sig: Take 1 capsule by mouth 2 (Two) Times a Day.       Review and/or summary of lab tests, radiology, procedures, medications. Review and summary of old records and obtaining of history. The  risks and benefits of my recommendations, as well as other treatment options were discussed with the patient today. Questions were answered.    Follow-up: Return in about 6 weeks (around 12/23/2019), or if symptoms worsen or fail to improve.     * Surgery not found *      This document has been electronically signed by Genaro Franco PA-C on November 12, 2019 10:44 AM      Results for orders placed or performed during the hospital encounter of 08/16/19   Tissue Pathology Exam   Result Value Ref Range    Case Report       Surgical Pathology Report                         Case: [id number]                                  Authorizing Provider:  Kendrick Ruiz MD        Collected:           08/16/2019 10:53 AM          Ordering Location:     [facility]             Received:            08/16/2019 01:47 PM                                 [place] [facility]                                                     Pathologist:           Geovanni Wilder MD                                                         Specimens:   1) - Gastric, Antrum                                                                                2) - Esophagus, Distal                                                                              3) - Large Intestine, colonic mucosa                                                       Final Diagnosis       1.  MUCOSA, ANTRUM OF STOMACH:  REACTIVE GASTROPATHY.    2.  MUCOSA, DISTAL ESOPHAGUS:  REACTIVE CHANGE OF SQUAMOUS MUCOSA.    3.  MUCOSA, COLON:  NO SIGNIFICANT HISTOLOGIC ABNORMALITY.      Gross Description       Received for examination are 3 containers, each of which have nodular bits of white soft tissue measuring 0.3-0.5 cc in aggregate.  All specimens are embedded as labeled.  1A antrum of stomach; 2A distal esophagus; 3A mucosa of colon.     Results for orders placed or performed during the hospital encounter of 05/13/19   CBC Auto Differential   Result Value Ref Range    WBC  9.24 4.80 - 10.80 10*3/mm3    RBC 4.30 4.20 - 5.40 10*6/mm3    Hemoglobin 11.2 (L) 12.0 - 16.0 g/dL    Hematocrit 34.7 (L) 37.0 - 47.0 %    MCV 80.7 (L) 82.0 - 98.0 fL    MCH 26.0 (L) 28.0 - 32.0 pg    MCHC 32.3 (L) 33.0 - 36.0 g/dL    RDW 14.4 12.0 - 15.0 %    RDW-SD 42.4 40.0 - 54.0 fl    MPV 11.5 6.0 - 12.0 fL    Platelets 284 130 - 400 10*3/mm3    Neutrophil % 72.2 39.0 - 78.0 %    Lymphocyte % 19.2 15.0 - 45.0 %    Monocyte % 7.0 4.0 - 12.0 %    Eosinophil % 0.8 0.0 - 4.0 %    Basophil % 0.4 0.0 - 2.0 %    Immature Grans % 0.4 0.0 - 5.0 %    Neutrophils, Absolute 6.67 1.87 - 8.40 10*3/mm3    Lymphocytes, Absolute 1.77 0.72 - 4.86 10*3/mm3    Monocytes, Absolute 0.65 0.19 - 1.30 10*3/mm3    Eosinophils, Absolute 0.07 0.00 - 0.70 10*3/mm3    Basophils, Absolute 0.04 0.00 - 0.20 10*3/mm3    Immature Grans, Absolute 0.04 0.00 - 0.05 10*3/mm3    nRBC 0.0 0.0 - 0.2 /100 WBC   POC Glucose Once   Result Value Ref Range    Glucose 255 (H) 70 - 130 mg/dL   POC Glucose Once   Result Value Ref Range    Glucose 221 (H) 70 - 130 mg/dL   POC Glucose Once   Result Value Ref Range    Glucose 201 (H) 70 - 130 mg/dL   POC Glucose Once   Result Value Ref Range    Glucose 295 (H) 70 - 130 mg/dL   POC Glucose Once   Result Value Ref Range    Glucose 381 (H) 70 - 130 mg/dL   POC Glucose Once   Result Value Ref Range    Glucose 465 (C) 70 - 130 mg/dL   POC Glucose Once   Result Value Ref Range    Glucose 430 (H) 70 - 130 mg/dL   POC Glucose Once   Result Value Ref Range    Glucose 310 (H) 70 - 130 mg/dL   POC Glucose Once   Result Value Ref Range    Glucose 226 (H) 70 - 130 mg/dL   POC Glucose Once   Result Value Ref Range    Glucose 277 (H) 70 - 130 mg/dL   Type & Screen   Result Value Ref Range    ABO Type O     RH type Negative     Antibody Screen Negative     T&S Expiration Date 5/16/2019 11:59:59 PM    Basic Metabolic Panel   Result Value Ref Range    Glucose 211 (H) 70 - 100 mg/dL    BUN 13 5 - 21 mg/dL    Creatinine 0.60 0.50 -  1.40 mg/dL    Sodium 137 135 - 145 mmol/L    Potassium 4.1 3.5 - 5.3 mmol/L    Chloride 97 (L) 98 - 110 mmol/L    CO2 28.0 24.0 - 31.0 mmol/L    Calcium 9.6 8.4 - 10.4 mg/dL    eGFR Non African Amer 102 >60 mL/min/1.73    BUN/Creatinine Ratio 21.7 7.0 - 25.0    Anion Gap 12.0 4.0 - 13.0 mmol/L   Results for orders placed or performed in visit on 04/30/19   Urinalysis, Microscopic Only - Urine, Clean Catch   Result Value Ref Range    RBC, UA 3-5 (A) None Seen /HPF    WBC, UA 13-20 (A) None Seen /HPF    Bacteria, UA 4+ (A) None Seen /HPF    Squamous Epithelial Cells, UA 3-6 (A) None Seen, 0-2 /HPF    Hyaline Casts, UA 3-6 None Seen /LPF    Methodology Automated Microscopy    Urinalysis With Culture If Indicated - Urine, Clean Catch   Result Value Ref Range    Color, UA Yellow Yellow, Straw    Appearance, UA Clear Clear    pH, UA <=5.0 5.0 - 8.0    Specific Gravity, UA 1.012 1.005 - 1.030    Glucose, UA Negative Negative    Ketones, UA Negative Negative    Bilirubin, UA Negative Negative    Blood, UA Negative Negative    Protein, UA Negative Negative    Leuk Esterase, UA Large (3+) (A) Negative    Nitrite, UA Positive (A) Negative    Urobilinogen, UA 0.2 E.U./dL 0.2 - 1.0 E.U./dL   CBC Auto Differential   Result Value Ref Range    WBC 7.72 4.80 - 10.80 10*3/mm3    RBC 4.39 4.20 - 5.40 10*6/mm3    Hemoglobin 11.3 (L) 12.0 - 16.0 g/dL    Hematocrit 34.5 (L) 37.0 - 47.0 %    MCV 78.6 (L) 82.0 - 98.0 fL    MCH 25.7 (L) 28.0 - 32.0 pg    MCHC 32.8 (L) 33.0 - 36.0 g/dL    RDW 14.9 12.0 - 15.0 %    RDW-SD 42.6 40.0 - 54.0 fl    MPV 11.2 6.0 - 12.0 fL    Platelets 335 130 - 400 10*3/mm3    Neutrophil % 62.4 39.0 - 78.0 %    Lymphocyte % 27.2 15.0 - 45.0 %    Monocyte % 6.6 4.0 - 12.0 %    Eosinophil % 2.7 0.0 - 4.0 %    Basophil % 0.6 0.0 - 2.0 %    Immature Grans % 0.5 0.0 - 5.0 %    Neutrophils, Absolute 4.81 1.87 - 8.40 10*3/mm3    Lymphocytes, Absolute 2.10 0.72 - 4.86 10*3/mm3    Monocytes, Absolute 0.51 0.19 - 1.30  10*3/mm3    Eosinophils, Absolute 0.21 0.00 - 0.70 10*3/mm3    Basophils, Absolute 0.05 0.00 - 0.20 10*3/mm3    Immature Grans, Absolute 0.04 0.00 - 0.05 10*3/mm3    nRBC 0.0 0.0 - 0.2 /100 WBC   aPTT   Result Value Ref Range    PTT 32.9 24.1 - 35.0 seconds   Protime-INR   Result Value Ref Range    Protime 13.5 11.9 - 14.6 Seconds    INR 1.00 0.91 - 1.09   Urine Culture - Urine, Urine, Clean Catch   Result Value Ref Range    Urine Culture >100,000 CFU/mL Escherichia coli (A)        Susceptibility    Escherichia coli - DANNY     Ampicillin >=32 Resistant ug/ml     Ampicillin + Sulbactam >=32 Resistant ug/ml     Cefazolin* 16 Intermediate ug/ml      * Cefazolin results may be used to predict the potential effectiveness of oral cephalosporins for treating uncomplicated urinary tract infections.     Cefepime <=1 Susceptible ug/ml     Ceftriaxone <=1 Susceptible ug/ml     Ertapenem <=0.5 Susceptible ug/ml     ESBL Confirmation Test NEG Negative ug/ml     Gentamicin >=16 Resistant ug/ml     Levofloxacin <=0.12 Susceptible ug/ml     Meropenem <=0.25 Susceptible ug/ml     Nitrofurantoin <=16 Susceptible ug/ml     Piperacillin + Tazobactam <=4 Susceptible ug/ml     Tobramycin 8 Intermediate ug/ml     Trimethoprim + Sulfamethoxazole >=320 Resistant ug/ml   Comprehensive Metabolic Panel   Result Value Ref Range    Glucose 186 (H) 70 - 100 mg/dL    BUN 16 5 - 21 mg/dL    Creatinine 0.59 0.50 - 1.40 mg/dL    Sodium 139 135 - 145 mmol/L    Potassium 4.1 3.5 - 5.3 mmol/L    Chloride 102 98 - 110 mmol/L    CO2 22.0 (L) 24.0 - 31.0 mmol/L    Calcium 10.3 8.4 - 10.4 mg/dL    Total Protein 7.8 6.3 - 8.7 g/dL    Albumin 4.50 3.50 - 5.00 g/dL    ALT (SGPT) <15 0 - 54 U/L    AST (SGOT) 20 7 - 45 U/L    Alkaline Phosphatase 127 (H) 24 - 120 U/L    Total Bilirubin 0.3 0.1 - 1.0 mg/dL    eGFR Non African Amer 104 >60 mL/min/1.73    Globulin 3.3 gm/dL    A/G Ratio 1.4 1.1 - 2.5 g/dL    BUN/Creatinine Ratio 27.1 (H) 7.0 - 25.0    Anion Gap 15.0  (H) 4.0 - 13.0 mmol/L     *Note: Due to a large number of results and/or encounters for the requested time period, some results have not been displayed. A complete set of results can be found in Results Review.       Some portions of this note have been dictated using voice recognition software and may contain errors and/or omissions.

## 2019-11-21 ENCOUNTER — DOCUMENTATION (OUTPATIENT)
Dept: GASTROENTEROLOGY | Facility: CLINIC | Age: 59
End: 2019-11-21

## 2019-11-21 NOTE — PROGRESS NOTES
Patients Prior Auth  for Omeprazole 40 bid has been approved by her insurance company from 11/14/2019 to 02/14/2020. Pharmacy and patient are both aware.

## 2019-12-09 ENCOUNTER — APPOINTMENT (OUTPATIENT)
Dept: CT IMAGING | Facility: HOSPITAL | Age: 59
End: 2019-12-09

## 2019-12-16 ENCOUNTER — APPOINTMENT (OUTPATIENT)
Dept: CT IMAGING | Facility: HOSPITAL | Age: 59
End: 2019-12-16

## 2019-12-30 ENCOUNTER — TRANSCRIBE ORDERS (OUTPATIENT)
Dept: ADMINISTRATIVE | Facility: HOSPITAL | Age: 59
End: 2019-12-30

## 2019-12-30 DIAGNOSIS — M54.2 CERVICALGIA: Primary | ICD-10-CM

## 2020-01-06 ENCOUNTER — APPOINTMENT (OUTPATIENT)
Dept: CT IMAGING | Facility: HOSPITAL | Age: 60
End: 2020-01-06

## 2020-01-15 ENCOUNTER — OFFICE VISIT (OUTPATIENT)
Dept: GASTROENTEROLOGY | Facility: CLINIC | Age: 60
End: 2020-01-15

## 2020-01-15 ENCOUNTER — APPOINTMENT (OUTPATIENT)
Dept: CT IMAGING | Facility: HOSPITAL | Age: 60
End: 2020-01-15

## 2020-01-15 VITALS
SYSTOLIC BLOOD PRESSURE: 153 MMHG | BODY MASS INDEX: 28.42 KG/M2 | DIASTOLIC BLOOD PRESSURE: 92 MMHG | WEIGHT: 160.4 LBS | HEART RATE: 114 BPM | HEIGHT: 63 IN

## 2020-01-15 DIAGNOSIS — R74.8 ALKALINE PHOSPHATASE ELEVATION: ICD-10-CM

## 2020-01-15 DIAGNOSIS — K21.00 GASTROESOPHAGEAL REFLUX DISEASE WITH ESOPHAGITIS: Primary | ICD-10-CM

## 2020-01-15 DIAGNOSIS — K59.00 CONSTIPATION, UNSPECIFIED CONSTIPATION TYPE: ICD-10-CM

## 2020-01-15 DIAGNOSIS — K74.00 HEPATIC FIBROSIS: ICD-10-CM

## 2020-01-15 PROCEDURE — 99213 OFFICE O/P EST LOW 20 MIN: CPT | Performed by: PHYSICIAN ASSISTANT

## 2020-01-15 NOTE — PATIENT INSTRUCTIONS

## 2020-01-15 NOTE — PROGRESS NOTES
Chief Complaint   Patient presents with   • Heartburn   • Hepatic Fibrosis   • Erosive Esophagitis       ENDO PROCEDURE ORDERED:    Subjective    Margueritejuan luis Rob is a 59 y.o. female. she is here today for follow-up.    History of Present Illness    Patient is seen on a recheck of her GERD, hepatic fibrosis, esophagitis.  Last seen  11/11/2019.  Patient got her laboratories at [place].  The patient states she is supposed to have a breast biopsy.  She is concerned about an area that has been abnormal for some time on mammogram.  Last EGD/colonoscopy 08/16/2019 showed esophagitis, gastritis, hemorrhoids.    Patient is currently on Prilosec for heartburn.  Denied nausea, vomiting, dysphagia.  She takes Senokot as needed for constipation.  Weight is up 3 pounds since last visit.    Laboratories on 12/09/2019:  TORRES Fibrosure 0.05/F0, steatosis 0.60/S2, 0.50/N1.  INR 1.0.  Glucose 111, triglycerides 172.    Laboratories on 01/02/2020:  CMP showed glucose 157, alkaline phosphatase 160, otherwise normal.  Normal TSH, cholesterol panel.    ASSESSMENT/PLAN:  Patient with chronic GERD, constipation, elevated liver enzymes with mild steatohepatitis.  Encouraged dietary modification and weight loss.  We will continue current regimen.  Followup in 6 months, sooner if needed.       The following portions of the patient's history were reviewed and updated as appropriate:   Past Medical History:   Diagnosis Date   • Alcoholic fatty liver    • Alkaline phosphatase raised    • Anxiety    • Asthma     IgE-MEDIATED ALLERGIC ASTHMA   • Backache     CHRONIC   • CHF (congestive heart failure) (CMS/HCC)    • Chronic hepatitis C (CMS/HCC)     2b. Fibrosure .05/F0, necroinflam .14/A0. Repeat .05/F0, .13/A0      • Chronic neck pain    • Constipation    • COPD (chronic obstructive pulmonary disease) (CMS/HCC)    • Diabetes (CMS/HCC)    • Diarrhea    • Elevated levels of transaminase & lactic acid dehydrogenase    • Emphysema, unspecified  (CMS/HCC)    • Generalized abdominal pain    • GERD (gastroesophageal reflux disease)     WITH ESOPHAGITIS   • Hepatitis    • High risk sexual behavior    • Hypertension    • Hypokalemia    • Irritable bowel syndrome (IBS)    • Multiple joint pain    • Need for vaccination    • Numbness and tingling in left arm    • On long term drug therapy    • Shoulder pain    • Tobacco dependence syndrome      Past Surgical History:   Procedure Laterality Date   • ANTERIOR CERVICAL DISCECTOMY W/ FUSION N/A 5/13/2019    Procedure: EXPLORATION OF FUSION C5-6, ANTERIOR CERVICAL DISCECTOMY FUSION C3-5 WITH INSTRUMENTATION C3-6;  Surgeon: YINKA Sanders MD;  Location: [facility] OR;  Service: Orthopedic Spine   • APPENDECTOMY     • CERVICAL FUSION  1999    C5-6   • CHOLECYSTECTOMY     • COLONOSCOPY  02/08/2016   • COLONOSCOPY N/A 8/16/2019    Procedure: COLONOSCOPY;  Surgeon: Kendrick Ruiz MD;  Location: [facility] ENDOSCOPY;  Service: Gastroenterology   • COLONOSCOPY W/ POLYPECTOMY  02/08/2016    External and internal hemorrhoids.The examination was otherwise normal.Fluid aspiration performed.Several biopsies obtained in the entire colon.   • ENDOSCOPY  02/08/2016    Mildly severe esophagitis.Gastritis.Normal examined duodenum.Several biopsies obtained in the lower third of the esophagus.Several biopsies obtained in the gastric antrum.Several biopsies obtained in the first part of the duodenum.   • ENDOSCOPY  08/13/2012    Esophagitis seen. Biopsy taken. Gastritis in stomach. Biopsy taken. Normal duodenum. Biopsy taken.   • ENDOSCOPY N/A 8/16/2019    Procedure: ESOPHAGOGASTRODUODENOSCOPY;  Surgeon: Kendrick Ruiz MD;  Location: [facility] ENDOSCOPY;  Service: Gastroenterology   • ENDOSCOPY AND COLONOSCOPY  08/13/2012    Internal & external hemorrhoids found. Scope could not pass into the TI.   • FINGER SURGERY Left 04/2019    thumb - piece of metal removed   • HARDWARE REMOVAL N/A 5/13/2019    Procedure: REMOVAL OF INSTRUMENTATION;   Surgeon: YINKA Sanders MD;  Location: [facility];  Service: Orthopedic Spine   • HEMORRHOIDECTOMY     • HERNIA REPAIR      pt states no hernia was found so no procedure was done so they closed her back up   • INJECTION OF MEDICATION  08/01/2013    METHYLPREDNISONE, X2   • INJECTION OF MEDICATION  11/03/2011    KENALOG   • INJECTION OF MEDICATION  02/24/2011    ROCEPHIN   • KNEE SURGERY Right    • TONSILLECTOMY AND ADENOIDECTOMY     • TOTAL ABDOMINAL HYSTERECTOMY WITH SALPINGO OOPHORECTOMY     • UPPER GASTROINTESTINAL ENDOSCOPY  02/08/2016   • UPPER GASTROINTESTINAL ENDOSCOPY  08/16/2019     Family History   Problem Relation Age of Onset   • Coronary artery disease Mother    • Diabetes Mother    • Heart failure Mother    • Cancer Father    • Diabetes Father    • Heart failure Father    • Thyroid disease Father    • Breast cancer Sister    • Cancer Other         COLORECTAL   • Endometrial cancer Other    • Ovarian cancer Other    • Diabetes Brother      OB History    None       Allergies   Allergen Reactions   • Doxycycline Itching   • Penicillins Rash     Social History     Socioeconomic History   • Marital status:      Spouse name: Not on file   • Number of children: Not on file   • Years of education: Not on file   • Highest education level: Not on file   Tobacco Use   • Smoking status: Current Every Day Smoker     Packs/day: 0.25     Years: 45.00     Pack years: 11.25     Types: Cigarettes   • Smokeless tobacco: Never Used   Substance and Sexual Activity   • Alcohol use: No   • Drug use: No   • Sexual activity: Defer     Current Medications:  Prior to Admission medications    Medication Sig Start Date End Date Taking? Authorizing Provider   amitriptyline (ELAVIL) 100 MG tablet Take 200 mg by mouth Every Night.   Yes Provider, MD Modesta   amLODIPine (NORVASC) 5 MG tablet Take 5 mg by mouth Daily.   Yes Provider, MD Modesta   cetirizine (zyrTEC) 10 MG tablet Take 10 mg by mouth Daily.   Yes  Modesta Chaudhary MD   fluconazole (DIFLUCAN) 100 MG tablet Take 100 mg by mouth 1 (One) Time.   Yes Modesta Chaudhary MD   fluticasone (FLONASE) 50 MCG/ACT nasal spray 1 spray into each nostril Daily. 2/13/17  Yes Modesta Chaudhary MD   Fluticasone Furoate-Vilanterol (BREO ELLIPTA) 100-25 MCG/INH inhaler Inhale 1 puff Daily. 3/27/19  Yes Denisse Marcano MD   furosemide (LASIX) 20 MG tablet Take 20 mg by mouth Daily.   Yes Modesta Chaudhary MD   gemfibrozil (LOPID) 600 MG tablet Take 600 mg by mouth 2 (Two) Times a Day Before Meals.   Yes Modesta Chaudhary MD   glyBURIDE (DIAbeta) 2.5 MG tablet Take 2.5 mg by mouth 2 (Two) Times a Day With Meals.   Yes Modesta Chaudhary MD   HYDROcodone-acetaminophen (NORCO) 7.5-325 MG per tablet Take 1 tablet by mouth 3 (Three) Times a Day.   Yes Modesta Chaudhary MD   Insulin Glargine (BASAGLAR KWIKPEN) 100 UNIT/ML injection pen Inject 35 Units under the skin into the appropriate area as directed.   Yes Modesta Chaudhary MD   JANUMET XR  MG tablet sustained-release 24 hour Take 1 tablet by mouth 2 (Two) Times a Day. 2/2/17  Yes Modesta Chaudhary MD   metoclopramide (REGLAN) 10 MG tablet Take 10 mg by mouth As Needed (nausea).   Yes Modesta Chaudhary MD   montelukast (SINGULAIR) 10 MG tablet Take 1 tablet by mouth Every Night. 2/23/17  Yes Denisse Marcano MD   omeprazole (priLOSEC) 40 MG capsule Take 1 capsule by mouth 2 (Two) Times a Day. 11/11/19  Yes Genaro Franco PA-C   potassium chloride (K-DUR,KLOR-CON) 10 MEQ CR tablet Take 10 mEq by mouth 2 (Two) Times a Day.   Yes Modesta Chaudhary MD   pravastatin (PRAVACHOL) 20 MG tablet Take 20 mg by mouth Every Night.   Yes Modesta Chaudhary MD   sennosides-docusate sodium (SENOKOT-S) 8.6-50 MG tablet Take 2 tablets by mouth Every Night.   Yes Modesta Chaudhary MD     Review of Systems  Review of Systems       Objective    /92 (BP Location: Left arm)   Pulse 114   " Ht 160 cm (63\")   Wt 72.8 kg (160 lb 6.4 oz)   BMI 28.41 kg/m²   Physical Exam   Constitutional: She is oriented to person, place, and time. She appears well-developed and well-nourished. No distress.   HENT:   Head: Normocephalic and atraumatic.   Eyes: Pupils are equal, round, and reactive to light. EOM are normal.   Neck: Normal range of motion.   Cardiovascular: Normal rate, regular rhythm and normal heart sounds.   Pulmonary/Chest: Effort normal and breath sounds normal.   Abdominal: Soft. Bowel sounds are normal. She exhibits no shifting dullness, no distension, no abdominal bruit, no ascites and no mass. There is no hepatosplenomegaly. There is tenderness. There is no rigidity, no rebound, no guarding and no CVA tenderness. No hernia. Hernia confirmed negative in the ventral area.   Mild diffuse   Musculoskeletal: Normal range of motion.   Neurological: She is alert and oriented to person, place, and time.   Skin: Skin is warm and dry.   Psychiatric: She has a normal mood and affect. Her behavior is normal. Judgment and thought content normal.   Nursing note and vitals reviewed.    Assessment/Plan      1. Gastroesophageal reflux disease with esophagitis    2. Alkaline phosphatase elevation    3. Hepatic fibrosis    4. Constipation, unspecified constipation type    .   Marguerite was seen today for heartburn, hepatic fibrosis and erosive esophagitis.    Diagnoses and all orders for this visit:    Gastroesophageal reflux disease with esophagitis    Alkaline phosphatase elevation  -     Hepatic Function Panel; Future  -     Protime-INR; Future    Hepatic fibrosis  -     Hepatic Function Panel; Future  -     Protime-INR; Future    Constipation, unspecified constipation type        Orders placed during this encounter include:  Orders Placed This Encounter   Procedures   • Hepatic Function Panel     Standing Status:   Future     Standing Expiration Date:   7/28/2020   • Protime-INR     Standing Status:   Future     " Standing Expiration Date:   7/28/2020       Medications prescribed:  No orders of the defined types were placed in this encounter.      Requested Prescriptions      No prescriptions requested or ordered in this encounter       Review and/or summary of lab tests, radiology, procedures, medications. Review and summary of old records and obtaining of history. The risks and benefits of my recommendations, as well as other treatment options were discussed with the patient today. Questions were answered.    Follow-up: Return in about 6 months (around 7/15/2020), or if symptoms worsen or fail to improve, for lab prior.     * Surgery not found *      This document has been electronically signed by Genaro Franco PA-C on January 19, 2020 1:14 PM      Results for orders placed or performed during the hospital encounter of 08/16/19   Tissue Pathology Exam   Result Value Ref Range    Case Report       Surgical Pathology Report                         Case: [id number]                                  Authorizing Provider:  Kendrick Ruiz MD        Collected:           08/16/2019 10:53 AM          Ordering Location:     [facility]             Received:            08/16/2019 01:47 PM                                 [place] [facility]                                                     Pathologist:           Geovanni Wilder MD                                                         Specimens:   1) - Gastric, Antrum                                                                                2) - Esophagus, Distal                                                                              3) - Large Intestine, colonic mucosa                                                       Final Diagnosis       1.  MUCOSA, ANTRUM OF STOMACH:  REACTIVE GASTROPATHY.    2.  MUCOSA, DISTAL ESOPHAGUS:  REACTIVE CHANGE OF SQUAMOUS MUCOSA.    3.  MUCOSA, COLON:  NO SIGNIFICANT HISTOLOGIC ABNORMALITY.      Gross Description        Received for examination are 3 containers, each of which have nodular bits of white soft tissue measuring 0.3-0.5 cc in aggregate.  All specimens are embedded as labeled.  1A antrum of stomach; 2A distal esophagus; 3A mucosa of colon.     Results for orders placed or performed during the hospital encounter of 05/13/19   CBC Auto Differential   Result Value Ref Range    WBC 9.24 4.80 - 10.80 10*3/mm3    RBC 4.30 4.20 - 5.40 10*6/mm3    Hemoglobin 11.2 (L) 12.0 - 16.0 g/dL    Hematocrit 34.7 (L) 37.0 - 47.0 %    MCV 80.7 (L) 82.0 - 98.0 fL    MCH 26.0 (L) 28.0 - 32.0 pg    MCHC 32.3 (L) 33.0 - 36.0 g/dL    RDW 14.4 12.0 - 15.0 %    RDW-SD 42.4 40.0 - 54.0 fl    MPV 11.5 6.0 - 12.0 fL    Platelets 284 130 - 400 10*3/mm3    Neutrophil % 72.2 39.0 - 78.0 %    Lymphocyte % 19.2 15.0 - 45.0 %    Monocyte % 7.0 4.0 - 12.0 %    Eosinophil % 0.8 0.0 - 4.0 %    Basophil % 0.4 0.0 - 2.0 %    Immature Grans % 0.4 0.0 - 5.0 %    Neutrophils, Absolute 6.67 1.87 - 8.40 10*3/mm3    Lymphocytes, Absolute 1.77 0.72 - 4.86 10*3/mm3    Monocytes, Absolute 0.65 0.19 - 1.30 10*3/mm3    Eosinophils, Absolute 0.07 0.00 - 0.70 10*3/mm3    Basophils, Absolute 0.04 0.00 - 0.20 10*3/mm3    Immature Grans, Absolute 0.04 0.00 - 0.05 10*3/mm3    nRBC 0.0 0.0 - 0.2 /100 WBC   POC Glucose Once   Result Value Ref Range    Glucose 255 (H) 70 - 130 mg/dL   POC Glucose Once   Result Value Ref Range    Glucose 221 (H) 70 - 130 mg/dL   POC Glucose Once   Result Value Ref Range    Glucose 201 (H) 70 - 130 mg/dL   POC Glucose Once   Result Value Ref Range    Glucose 295 (H) 70 - 130 mg/dL   POC Glucose Once   Result Value Ref Range    Glucose 381 (H) 70 - 130 mg/dL   POC Glucose Once   Result Value Ref Range    Glucose 465 (C) 70 - 130 mg/dL   POC Glucose Once   Result Value Ref Range    Glucose 430 (H) 70 - 130 mg/dL   POC Glucose Once   Result Value Ref Range    Glucose 310 (H) 70 - 130 mg/dL   POC Glucose Once   Result Value Ref Range    Glucose 226 (H)  70 - 130 mg/dL   POC Glucose Once   Result Value Ref Range    Glucose 277 (H) 70 - 130 mg/dL   Type & Screen   Result Value Ref Range    ABO Type O     RH type Negative     Antibody Screen Negative     T&S Expiration Date 5/16/2019 11:59:59 PM    Basic Metabolic Panel   Result Value Ref Range    Glucose 211 (H) 70 - 100 mg/dL    BUN 13 5 - 21 mg/dL    Creatinine 0.60 0.50 - 1.40 mg/dL    Sodium 137 135 - 145 mmol/L    Potassium 4.1 3.5 - 5.3 mmol/L    Chloride 97 (L) 98 - 110 mmol/L    CO2 28.0 24.0 - 31.0 mmol/L    Calcium 9.6 8.4 - 10.4 mg/dL    eGFR Non African Amer 102 >60 mL/min/1.73    BUN/Creatinine Ratio 21.7 7.0 - 25.0    Anion Gap 12.0 4.0 - 13.0 mmol/L   Results for orders placed or performed in visit on 04/30/19   Urinalysis, Microscopic Only - Urine, Clean Catch   Result Value Ref Range    RBC, UA 3-5 (A) None Seen /HPF    WBC, UA 13-20 (A) None Seen /HPF    Bacteria, UA 4+ (A) None Seen /HPF    Squamous Epithelial Cells, UA 3-6 (A) None Seen, 0-2 /HPF    Hyaline Casts, UA 3-6 None Seen /LPF    Methodology Automated Microscopy    Urinalysis With Culture If Indicated - Urine, Clean Catch   Result Value Ref Range    Color, UA Yellow Yellow, Straw    Appearance, UA Clear Clear    pH, UA <=5.0 5.0 - 8.0    Specific Gravity, UA 1.012 1.005 - 1.030    Glucose, UA Negative Negative    Ketones, UA Negative Negative    Bilirubin, UA Negative Negative    Blood, UA Negative Negative    Protein, UA Negative Negative    Leuk Esterase, UA Large (3+) (A) Negative    Nitrite, UA Positive (A) Negative    Urobilinogen, UA 0.2 E.U./dL 0.2 - 1.0 E.U./dL   CBC Auto Differential   Result Value Ref Range    WBC 7.72 4.80 - 10.80 10*3/mm3    RBC 4.39 4.20 - 5.40 10*6/mm3    Hemoglobin 11.3 (L) 12.0 - 16.0 g/dL    Hematocrit 34.5 (L) 37.0 - 47.0 %    MCV 78.6 (L) 82.0 - 98.0 fL    MCH 25.7 (L) 28.0 - 32.0 pg    MCHC 32.8 (L) 33.0 - 36.0 g/dL    RDW 14.9 12.0 - 15.0 %    RDW-SD 42.6 40.0 - 54.0 fl    MPV 11.2 6.0 - 12.0 fL     Platelets 335 130 - 400 10*3/mm3    Neutrophil % 62.4 39.0 - 78.0 %    Lymphocyte % 27.2 15.0 - 45.0 %    Monocyte % 6.6 4.0 - 12.0 %    Eosinophil % 2.7 0.0 - 4.0 %    Basophil % 0.6 0.0 - 2.0 %    Immature Grans % 0.5 0.0 - 5.0 %    Neutrophils, Absolute 4.81 1.87 - 8.40 10*3/mm3    Lymphocytes, Absolute 2.10 0.72 - 4.86 10*3/mm3    Monocytes, Absolute 0.51 0.19 - 1.30 10*3/mm3    Eosinophils, Absolute 0.21 0.00 - 0.70 10*3/mm3    Basophils, Absolute 0.05 0.00 - 0.20 10*3/mm3    Immature Grans, Absolute 0.04 0.00 - 0.05 10*3/mm3    nRBC 0.0 0.0 - 0.2 /100 WBC   aPTT   Result Value Ref Range    PTT 32.9 24.1 - 35.0 seconds   Protime-INR   Result Value Ref Range    Protime 13.5 11.9 - 14.6 Seconds    INR 1.00 0.91 - 1.09   Urine Culture - Urine, Urine, Clean Catch   Result Value Ref Range    Urine Culture >100,000 CFU/mL Escherichia coli (A)        Susceptibility    Escherichia coli - DANNY     Ampicillin >=32 Resistant ug/ml     Ampicillin + Sulbactam >=32 Resistant ug/ml     Cefazolin* 16 Intermediate ug/ml      * Cefazolin results may be used to predict the potential effectiveness of oral cephalosporins for treating uncomplicated urinary tract infections.     Cefepime <=1 Susceptible ug/ml     Ceftriaxone <=1 Susceptible ug/ml     Ertapenem <=0.5 Susceptible ug/ml     ESBL Confirmation Test NEG Negative ug/ml     Gentamicin >=16 Resistant ug/ml     Levofloxacin <=0.12 Susceptible ug/ml     Meropenem <=0.25 Susceptible ug/ml     Nitrofurantoin <=16 Susceptible ug/ml     Piperacillin + Tazobactam <=4 Susceptible ug/ml     Tobramycin 8 Intermediate ug/ml     Trimethoprim + Sulfamethoxazole >=320 Resistant ug/ml   Comprehensive Metabolic Panel   Result Value Ref Range    Glucose 186 (H) 70 - 100 mg/dL    BUN 16 5 - 21 mg/dL    Creatinine 0.59 0.50 - 1.40 mg/dL    Sodium 139 135 - 145 mmol/L    Potassium 4.1 3.5 - 5.3 mmol/L    Chloride 102 98 - 110 mmol/L    CO2 22.0 (L) 24.0 - 31.0 mmol/L    Calcium 10.3 8.4 - 10.4  mg/dL    Total Protein 7.8 6.3 - 8.7 g/dL    Albumin 4.50 3.50 - 5.00 g/dL    ALT (SGPT) <15 0 - 54 U/L    AST (SGOT) 20 7 - 45 U/L    Alkaline Phosphatase 127 (H) 24 - 120 U/L    Total Bilirubin 0.3 0.1 - 1.0 mg/dL    eGFR Non African Amer 104 >60 mL/min/1.73    Globulin 3.3 gm/dL    A/G Ratio 1.4 1.1 - 2.5 g/dL    BUN/Creatinine Ratio 27.1 (H) 7.0 - 25.0    Anion Gap 15.0 (H) 4.0 - 13.0 mmol/L     *Note: Due to a large number of results and/or encounters for the requested time period, some results have not been displayed. A complete set of results can be found in Results Review.       Some portions of this note have been dictated using voice recognition software and may contain errors and/or omissions.

## 2020-02-12 ENCOUNTER — APPOINTMENT (OUTPATIENT)
Dept: CT IMAGING | Facility: HOSPITAL | Age: 60
End: 2020-02-12

## 2020-07-15 ENCOUNTER — OFFICE VISIT (OUTPATIENT)
Dept: GASTROENTEROLOGY | Facility: CLINIC | Age: 60
End: 2020-07-15

## 2020-07-15 DIAGNOSIS — R74.8 ALKALINE PHOSPHATASE ELEVATION: ICD-10-CM

## 2020-07-15 DIAGNOSIS — R10.10 PAIN OF UPPER ABDOMEN: Primary | ICD-10-CM

## 2020-07-15 DIAGNOSIS — K74.00 HEPATIC FIBROSIS: ICD-10-CM

## 2020-07-15 DIAGNOSIS — R14.0 BLOATING: ICD-10-CM

## 2020-07-15 DIAGNOSIS — K21.00 GASTROESOPHAGEAL REFLUX DISEASE WITH ESOPHAGITIS: ICD-10-CM

## 2020-07-15 DIAGNOSIS — D64.9 ACUTE ANEMIA: ICD-10-CM

## 2020-07-15 PROCEDURE — 99442 PR PHYS/QHP TELEPHONE EVALUATION 11-20 MIN: CPT | Performed by: PHYSICIAN ASSISTANT

## 2020-07-15 NOTE — PROGRESS NOTES
No chief complaint on file.      ENDO PROCEDURE ORDERED:    Subjective    Marguerite Rob is a 60 y.o. female. she is here today for follow-up.    History of Present Illness    You have chosen to receive care through a telephone visit. Do you consent to use a telephone visit for your medical care today? Yes    Telephone encounter. Last seen 01/5/2020.     Apparently she had presented today, her appointment had been rescheduled for the 20th because I was supposed to be out of office. Nevertheless patient said she was not notified, there was a miscommunication with screaming at the front of the building and the patient was denied entry. I did talk to her by telephone for approximately 12-1/2 minutes. Patient states she is still having a lot of difficulty with her abdomen being swollen and bloating. She states she has gained 10 pounds. She believes she had laboratories a week or so ago with her primary care. She did see GUILLE Messer, on 07/02/2020. An MRI of the brain was ordered for syncope and a CT scan abdomen and pelvis because of her abdominal discomfort. She states it is to be done at [facility]. Patient also states she was told in January she had breast cancer and had a left mastectomy, states she has not required chemotherapy or radiation. Her last EGD/colonoscopy on 08/16/2019 showed esophagitis, gastritis, hemorrhoids. She did not repeat her laboratories prior to this appointment. She has had a persistent elevation in alkaline phosphatase. She also states she was told she was anemic by her hematologist which she has been transferred to in [place], patient states she has had iron studies drawn but does not know the results. I could not see those in [facility].     Patient had last CBC that was normal was 01/29/2020, showed a hemoglobin 12.1, hematocrit 38.2. On 02/13/2020 she underwent left breast mastectomy with axillary dissection, showed evasive lobular carcinoma with negative node. She had  had a subsequent CT scan chest, abdomen, pelvis with contrast 02/21/2020 which showed left mastectomy with drains present, 2.3 cm bulla in the right lower lobe, stable adrenal nodule, post cholecystectomy, hysterectomy, 17 mm left inguinal node.     Laboratory on 03/25/2020, CMP showed a glucose 357, sodium 132, chloride 97, CO2 of 21, alkaline phosphatase 158, otherwise normal. CBC showed hemoglobin 10.9, hematocrit 36.2.     Laboratory 05/05/2020 CMP showed glucose 195, protein 8.7, sodium 135, alkaline phosphatase 184, otherwise normal. CBC showed a hemoglobin of 11.3, hematocrit 37.9. More recent laboratory 06/17/ 2020 CMP showed a glucose 187, alkaline phosphatase 159, otherwise normal. CBC showed a hemoglobin 10.7, hematocrit 35.2. She had a repeat A1c of 7.8% on 07/07/2020.     A/P: Patient with hepatic fibrosis with steatosis. The elevated alkaline phosphatase had been presumed secondary to a fatty liver. Recent finding of breast cancer. She has developed a significant anemia. I did see one hemoglobin of 7.1. She does not believe she ever had any transfusion with this. I encouraged her to follow-up with her hematologist/oncologist if she does need iron supplementation. Her blood counts have improved from her surgery. She states her CT scan is scheduled next week and will await the results. I did ask her to have those results forwarded to our office. Will otherwise plan to continue the current regimen. Will try to get her back sooner if possible. I did apologize to her again for not seeing her today, would have gladly seen her if the fact that she was here had not been miscommunicated. Nevertheless, further pending clinical course and the results of the above.       The following portions of the patient's history were reviewed and updated as appropriate:   Past Medical History:   Diagnosis Date   • Alcoholic fatty liver    • Alkaline phosphatase raised    • Anxiety    • Asthma     IgE-MEDIATED ALLERGIC ASTHMA    • Backache     CHRONIC   • CHF (congestive heart failure) (CMS/HCC)    • Chronic hepatitis C (CMS/HCC)     2b. Fibrosure .05/F0, necroinflam .14/A0. Repeat .05/F0, .13/A0      • Chronic neck pain    • Constipation    • COPD (chronic obstructive pulmonary disease) (CMS/HCC)    • Diabetes (CMS/HCC)    • Diarrhea    • Elevated levels of transaminase & lactic acid dehydrogenase    • Emphysema, unspecified (CMS/HCC)    • Generalized abdominal pain    • GERD (gastroesophageal reflux disease)     WITH ESOPHAGITIS   • Hepatitis    • High risk sexual behavior    • Hypertension    • Hypokalemia    • Irritable bowel syndrome (IBS)    • Multiple joint pain    • Need for vaccination    • Numbness and tingling in left arm    • On long term drug therapy    • Shoulder pain    • Tobacco dependence syndrome      Past Surgical History:   Procedure Laterality Date   • ANTERIOR CERVICAL DISCECTOMY W/ FUSION N/A 5/13/2019    Procedure: EXPLORATION OF FUSION C5-6, ANTERIOR CERVICAL DISCECTOMY FUSION C3-5 WITH INSTRUMENTATION C3-6;  Surgeon: YINKA Sanders MD;  Location: [facility] OR;  Service: Orthopedic Spine   • APPENDECTOMY     • CERVICAL FUSION  1999    C5-6   • CHOLECYSTECTOMY     • COLONOSCOPY  02/08/2016   • COLONOSCOPY N/A 8/16/2019    Procedure: COLONOSCOPY;  Surgeon: Kendrick Ruiz MD;  Location: [facility] ENDOSCOPY;  Service: Gastroenterology   • COLONOSCOPY W/ POLYPECTOMY  02/08/2016    External and internal hemorrhoids.The examination was otherwise normal.Fluid aspiration performed.Several biopsies obtained in the entire colon.   • ENDOSCOPY  02/08/2016    Mildly severe esophagitis.Gastritis.Normal examined duodenum.Several biopsies obtained in the lower third of the esophagus.Several biopsies obtained in the gastric antrum.Several biopsies obtained in the first part of the duodenum.   • ENDOSCOPY  08/13/2012    Esophagitis seen. Biopsy taken. Gastritis in stomach. Biopsy taken. Normal duodenum. Biopsy taken.   • ENDOSCOPY  N/A 8/16/2019    Procedure: ESOPHAGOGASTRODUODENOSCOPY;  Surgeon: Kendrick Ruiz MD;  Location: [facility] ENDOSCOPY;  Service: Gastroenterology   • ENDOSCOPY AND COLONOSCOPY  08/13/2012    Internal & external hemorrhoids found. Scope could not pass into the TI.   • FINGER SURGERY Left 04/2019    thumb - piece of metal removed   • HARDWARE REMOVAL N/A 5/13/2019    Procedure: REMOVAL OF INSTRUMENTATION;  Surgeon: YINKA Sanders MD;  Location: [facility] OR;  Service: Orthopedic Spine   • HEMORRHOIDECTOMY     • HERNIA REPAIR      pt states no hernia was found so no procedure was done so they closed her back up   • INJECTION OF MEDICATION  08/01/2013    METHYLPREDNISONE, X2   • INJECTION OF MEDICATION  11/03/2011    KENALOG   • INJECTION OF MEDICATION  02/24/2011    ROCEPHIN   • KNEE SURGERY Right    • TONSILLECTOMY AND ADENOIDECTOMY     • TOTAL ABDOMINAL HYSTERECTOMY WITH SALPINGO OOPHORECTOMY     • UPPER GASTROINTESTINAL ENDOSCOPY  02/08/2016   • UPPER GASTROINTESTINAL ENDOSCOPY  08/16/2019     Family History   Problem Relation Age of Onset   • Coronary artery disease Mother    • Diabetes Mother    • Heart failure Mother    • Cancer Father    • Diabetes Father    • Heart failure Father    • Thyroid disease Father    • Breast cancer Sister    • Cancer Other         COLORECTAL   • Endometrial cancer Other    • Ovarian cancer Other    • Diabetes Brother      OB History    None       Allergies   Allergen Reactions   • Doxycycline Itching   • Penicillins Rash     Social History     Socioeconomic History   • Marital status:      Spouse name: Not on file   • Number of children: Not on file   • Years of education: Not on file   • Highest education level: Not on file   Tobacco Use   • Smoking status: Current Every Day Smoker     Packs/day: 0.25     Years: 45.00     Pack years: 11.25     Types: Cigarettes   • Smokeless tobacco: Never Used   Substance and Sexual Activity   • Alcohol use: No   • Drug use: No   • Sexual  activity: Defer     Current Medications:  Prior to Admission medications    Medication Sig Start Date End Date Taking? Authorizing Provider   amitriptyline (ELAVIL) 100 MG tablet Take 200 mg by mouth Every Night.    Modesta Chaudhary MD   amLODIPine (NORVASC) 5 MG tablet Take 5 mg by mouth Daily.    Modesta Chaudhary MD   cetirizine (zyrTEC) 10 MG tablet Take 10 mg by mouth Daily.    Modesta Chaudhary MD   fluconazole (DIFLUCAN) 100 MG tablet Take 100 mg by mouth 1 (One) Time.    Modesta Chaudhary MD   fluticasone (FLONASE) 50 MCG/ACT nasal spray 1 spray into each nostril Daily. 2/13/17   Modesta Chaudhary MD   Fluticasone Furoate-Vilanterol (BREO ELLIPTA) 100-25 MCG/INH inhaler Inhale 1 puff Daily. 3/27/19   Denisse Marcano MD   furosemide (LASIX) 20 MG tablet Take 20 mg by mouth Daily.    Modesta Chaudhary MD   gemfibrozil (LOPID) 600 MG tablet Take 600 mg by mouth 2 (Two) Times a Day Before Meals.    Modesta Chaudhary MD   glyBURIDE (DIAbeta) 2.5 MG tablet Take 2.5 mg by mouth 2 (Two) Times a Day With Meals.    Modesta Chaudhary MD   HYDROcodone-acetaminophen (NORCO) 7.5-325 MG per tablet Take 1 tablet by mouth 3 (Three) Times a Day.    Modesta Chaudhary MD   Insulin Glargine (BASAGLAR KWIKPEN) 100 UNIT/ML injection pen Inject 35 Units under the skin into the appropriate area as directed.    Modesta Chaudhary MD   JANUMET XR  MG tablet sustained-release 24 hour Take 1 tablet by mouth 2 (Two) Times a Day. 2/2/17   Modesta Chaudhary MD   metoclopramide (REGLAN) 10 MG tablet Take 10 mg by mouth As Needed (nausea).    Modesta Chaudhary MD   montelukast (SINGULAIR) 10 MG tablet Take 1 tablet by mouth Every Night. 2/23/17   Denisse Marcano MD   omeprazole (priLOSEC) 40 MG capsule Take 1 capsule by mouth 2 (Two) Times a Day. 11/11/19   Genaro Franco PA-C   potassium chloride (K-DUR,KLOR-CON) 10 MEQ CR tablet Take 10 mEq by mouth 2 (Two) Times a Day.     Provider, MD Modesta   pravastatin (PRAVACHOL) 20 MG tablet Take 20 mg by mouth Every Night.    Provider, MD Modesta   sennosides-docusate sodium (SENOKOT-S) 8.6-50 MG tablet Take 2 tablets by mouth Every Night.    Provider, MD Modesta     Review of Systems  Review of Systems       Objective    There were no vitals taken for this visit.  Physical Exam   Constitutional: She is oriented to person, place, and time. No distress.   Neurological: She is alert and oriented to person, place, and time.     Assessment/Plan      1. Pain of upper abdomen    2. Acute anemia    3. Gastroesophageal reflux disease with esophagitis    4. Alkaline phosphatase elevation    5. Hepatic fibrosis    6. Bloating    .   Diagnoses and all orders for this visit:    Pain of upper abdomen    Acute anemia    Gastroesophageal reflux disease with esophagitis    Alkaline phosphatase elevation    Hepatic fibrosis    Bloating        Orders placed during this encounter include:  No orders of the defined types were placed in this encounter.      Medications prescribed:  No orders of the defined types were placed in this encounter.      Requested Prescriptions      No prescriptions requested or ordered in this encounter       Review and/or summary of lab tests, radiology, procedures, medications. Review and summary of old records and obtaining of history. The risks and benefits of my recommendations, as well as other treatment options were discussed with the patient today. Questions were answered.    Follow-up: Return if symptoms worsen or fail to improve, for After the above.     * Surgery not found *      This document has been electronically signed by Genaro Franco PA-C on July 15, 2020 15:03      Results for orders placed or performed during the hospital encounter of 08/16/19   Tissue Pathology Exam   Result Value Ref Range    Case Report       Surgical Pathology Report                         Case: [id number]                                   Authorizing Provider:  Kendrick Ruiz MD        Collected:           08/16/2019 10:53 AM          Ordering Location:     [facility]             Received:            08/16/2019 01:47 PM                                 [facility]                                                     Pathologist:           Geovanni Wilder MD                                                         Specimens:   1) - Gastric, Antrum                                                                                2) - Esophagus, Distal                                                                              3) - Large Intestine, colonic mucosa                                                       Final Diagnosis       1.  MUCOSA, ANTRUM OF STOMACH:  REACTIVE GASTROPATHY.    2.  MUCOSA, DISTAL ESOPHAGUS:  REACTIVE CHANGE OF SQUAMOUS MUCOSA.    3.  MUCOSA, COLON:  NO SIGNIFICANT HISTOLOGIC ABNORMALITY.      Gross Description       Received for examination are 3 containers, each of which have nodular bits of white soft tissue measuring 0.3-0.5 cc in aggregate.  All specimens are embedded as labeled.  1A antrum of stomach; 2A distal esophagus; 3A mucosa of colon.     Results for orders placed or performed during the hospital encounter of 05/13/19   CBC Auto Differential   Result Value Ref Range    WBC 9.24 4.80 - 10.80 10*3/mm3    RBC 4.30 4.20 - 5.40 10*6/mm3    Hemoglobin 11.2 (L) 12.0 - 16.0 g/dL    Hematocrit 34.7 (L) 37.0 - 47.0 %    MCV 80.7 (L) 82.0 - 98.0 fL    MCH 26.0 (L) 28.0 - 32.0 pg    MCHC 32.3 (L) 33.0 - 36.0 g/dL    RDW 14.4 12.0 - 15.0 %    RDW-SD 42.4 40.0 - 54.0 fl    MPV 11.5 6.0 - 12.0 fL    Platelets 284 130 - 400 10*3/mm3    Neutrophil % 72.2 39.0 - 78.0 %    Lymphocyte % 19.2 15.0 - 45.0 %    Monocyte % 7.0 4.0 - 12.0 %    Eosinophil % 0.8 0.0 - 4.0 %    Basophil % 0.4 0.0 - 2.0 %    Immature Grans % 0.4 0.0 - 5.0 %    Neutrophils, Absolute 6.67 1.87 - 8.40 10*3/mm3    Lymphocytes,  Absolute 1.77 0.72 - 4.86 10*3/mm3    Monocytes, Absolute 0.65 0.19 - 1.30 10*3/mm3    Eosinophils, Absolute 0.07 0.00 - 0.70 10*3/mm3    Basophils, Absolute 0.04 0.00 - 0.20 10*3/mm3    Immature Grans, Absolute 0.04 0.00 - 0.05 10*3/mm3    nRBC 0.0 0.0 - 0.2 /100 WBC   POC Glucose Once   Result Value Ref Range    Glucose 255 (H) 70 - 130 mg/dL   POC Glucose Once   Result Value Ref Range    Glucose 221 (H) 70 - 130 mg/dL   POC Glucose Once   Result Value Ref Range    Glucose 201 (H) 70 - 130 mg/dL   POC Glucose Once   Result Value Ref Range    Glucose 295 (H) 70 - 130 mg/dL   POC Glucose Once   Result Value Ref Range    Glucose 381 (H) 70 - 130 mg/dL   POC Glucose Once   Result Value Ref Range    Glucose 465 (C) 70 - 130 mg/dL   POC Glucose Once   Result Value Ref Range    Glucose 430 (H) 70 - 130 mg/dL   POC Glucose Once   Result Value Ref Range    Glucose 310 (H) 70 - 130 mg/dL   POC Glucose Once   Result Value Ref Range    Glucose 226 (H) 70 - 130 mg/dL   POC Glucose Once   Result Value Ref Range    Glucose 277 (H) 70 - 130 mg/dL   Type & Screen   Result Value Ref Range    ABO Type O     RH type Negative     Antibody Screen Negative     T&S Expiration Date 5/16/2019 11:59:59 PM    Basic Metabolic Panel   Result Value Ref Range    Glucose 211 (H) 70 - 100 mg/dL    BUN 13 5 - 21 mg/dL    Creatinine 0.60 0.50 - 1.40 mg/dL    Sodium 137 135 - 145 mmol/L    Potassium 4.1 3.5 - 5.3 mmol/L    Chloride 97 (L) 98 - 110 mmol/L    CO2 28.0 24.0 - 31.0 mmol/L    Calcium 9.6 8.4 - 10.4 mg/dL    eGFR Non African Amer 102 >60 mL/min/1.73    BUN/Creatinine Ratio 21.7 7.0 - 25.0    Anion Gap 12.0 4.0 - 13.0 mmol/L   Results for orders placed or performed in visit on 04/30/19   Urinalysis, Microscopic Only - Urine, Clean Catch   Result Value Ref Range    RBC, UA 3-5 (A) None Seen /HPF    WBC, UA 13-20 (A) None Seen /HPF    Bacteria, UA 4+ (A) None Seen /HPF    Squamous Epithelial Cells, UA 3-6 (A) None Seen, 0-2 /HPF    Hyaline  Casts, UA 3-6 None Seen /LPF    Methodology Automated Microscopy    Urinalysis With Culture If Indicated - Urine, Clean Catch   Result Value Ref Range    Color, UA Yellow Yellow, Straw    Appearance, UA Clear Clear    pH, UA <=5.0 5.0 - 8.0    Specific Gravity, UA 1.012 1.005 - 1.030    Glucose, UA Negative Negative    Ketones, UA Negative Negative    Bilirubin, UA Negative Negative    Blood, UA Negative Negative    Protein, UA Negative Negative    Leuk Esterase, UA Large (3+) (A) Negative    Nitrite, UA Positive (A) Negative    Urobilinogen, UA 0.2 E.U./dL 0.2 - 1.0 E.U./dL   CBC Auto Differential   Result Value Ref Range    WBC 7.72 4.80 - 10.80 10*3/mm3    RBC 4.39 4.20 - 5.40 10*6/mm3    Hemoglobin 11.3 (L) 12.0 - 16.0 g/dL    Hematocrit 34.5 (L) 37.0 - 47.0 %    MCV 78.6 (L) 82.0 - 98.0 fL    MCH 25.7 (L) 28.0 - 32.0 pg    MCHC 32.8 (L) 33.0 - 36.0 g/dL    RDW 14.9 12.0 - 15.0 %    RDW-SD 42.6 40.0 - 54.0 fl    MPV 11.2 6.0 - 12.0 fL    Platelets 335 130 - 400 10*3/mm3    Neutrophil % 62.4 39.0 - 78.0 %    Lymphocyte % 27.2 15.0 - 45.0 %    Monocyte % 6.6 4.0 - 12.0 %    Eosinophil % 2.7 0.0 - 4.0 %    Basophil % 0.6 0.0 - 2.0 %    Immature Grans % 0.5 0.0 - 5.0 %    Neutrophils, Absolute 4.81 1.87 - 8.40 10*3/mm3    Lymphocytes, Absolute 2.10 0.72 - 4.86 10*3/mm3    Monocytes, Absolute 0.51 0.19 - 1.30 10*3/mm3    Eosinophils, Absolute 0.21 0.00 - 0.70 10*3/mm3    Basophils, Absolute 0.05 0.00 - 0.20 10*3/mm3    Immature Grans, Absolute 0.04 0.00 - 0.05 10*3/mm3    nRBC 0.0 0.0 - 0.2 /100 WBC   aPTT   Result Value Ref Range    PTT 32.9 24.1 - 35.0 seconds   Protime-INR   Result Value Ref Range    Protime 13.5 11.9 - 14.6 Seconds    INR 1.00 0.91 - 1.09   Urine Culture - Urine, Urine, Clean Catch   Result Value Ref Range    Urine Culture >100,000 CFU/mL Escherichia coli (A)        Susceptibility    Escherichia coli - DANNY     Ampicillin >=32 Resistant ug/ml     Ampicillin + Sulbactam >=32 Resistant ug/ml      Cefazolin* 16 Intermediate ug/ml      * Cefazolin results may be used to predict the potential effectiveness of oral cephalosporins for treating uncomplicated urinary tract infections.     Cefepime <=1 Susceptible ug/ml     Ceftriaxone <=1 Susceptible ug/ml     Ertapenem <=0.5 Susceptible ug/ml     ESBL Confirmation Test NEG Negative ug/ml     Gentamicin >=16 Resistant ug/ml     Levofloxacin <=0.12 Susceptible ug/ml     Meropenem <=0.25 Susceptible ug/ml     Nitrofurantoin <=16 Susceptible ug/ml     Piperacillin + Tazobactam <=4 Susceptible ug/ml     Tobramycin 8 Intermediate ug/ml     Trimethoprim + Sulfamethoxazole >=320 Resistant ug/ml   Comprehensive Metabolic Panel   Result Value Ref Range    Glucose 186 (H) 70 - 100 mg/dL    BUN 16 5 - 21 mg/dL    Creatinine 0.59 0.50 - 1.40 mg/dL    Sodium 139 135 - 145 mmol/L    Potassium 4.1 3.5 - 5.3 mmol/L    Chloride 102 98 - 110 mmol/L    CO2 22.0 (L) 24.0 - 31.0 mmol/L    Calcium 10.3 8.4 - 10.4 mg/dL    Total Protein 7.8 6.3 - 8.7 g/dL    Albumin 4.50 3.50 - 5.00 g/dL    ALT (SGPT) <15 0 - 54 U/L    AST (SGOT) 20 7 - 45 U/L    Alkaline Phosphatase 127 (H) 24 - 120 U/L    Total Bilirubin 0.3 0.1 - 1.0 mg/dL    eGFR Non African Amer 104 >60 mL/min/1.73    Globulin 3.3 gm/dL    A/G Ratio 1.4 1.1 - 2.5 g/dL    BUN/Creatinine Ratio 27.1 (H) 7.0 - 25.0    Anion Gap 15.0 (H) 4.0 - 13.0 mmol/L     *Note: Due to a large number of results and/or encounters for the requested time period, some results have not been displayed. A complete set of results can be found in Results Review.       Some portions of this note have been dictated using voice recognition software and may contain errors and/or omissions.

## 2020-07-30 ENCOUNTER — TELEPHONE (OUTPATIENT)
Dept: GASTROENTEROLOGY | Facility: CLINIC | Age: 60
End: 2020-07-30

## 2020-07-30 NOTE — TELEPHONE ENCOUNTER
Patient has been contacted and made aware that her ct scan showed a Large amount of feces throughout the colon.  No bowel obstruction. Genaro Franco MS, PA-C recommends laxative medication. Patient states she takes amitiza 24 bid and senokot. She states that her bowels move just fine. She was very angry and hostile on the phone claiming that Genaro Franco MS, PA-C did not want to be her provider anymore which was a false claim. Patient was screaming over the phone. I did offer her an appointment for a telehealth visit with Dr. Ruiz on Tuesday. She agreed to that visit. She kept bringing up the appointment she had on July 15 and was refused entry into the building due to a communication error in office. I again apologized to her although I was not in office that week. Again patient was very hostile and angry over the phone.

## 2020-07-30 NOTE — TELEPHONE ENCOUNTER
"----- Message from Genaro Franco PA-C sent at 7/30/2020  5:05 PM CDT -----  She had extensive studies, she needs laxatives  ----- Message -----  From: Talia Gustafson MA  Sent: 7/30/2020   4:00 PM CDT  To: Genaro Franco PA-C    Patient states that you discussed with her about doing a small intestine test when you did a telephone visit with her 2 weeks ago, there is no mention in your note.   ----- Message -----  From: Genaro Franco PA-C  Sent: 7/30/2020  11:51 AM CDT  To: Talia Gustafson MA    Scan says she is full of poop  ----- Message -----  From: Talia Gustafson MA  Sent: 7/30/2020   8:45 AM CDT  To: Genaro Franco PA-C    Patient states her CT scan has been done it is available to view in Care Everywhere at [facility]. She states that you suggested a small intestine test after that CT Scan was done?   ----- Message -----  From: Belkis Garcia MA  Sent: 7/30/2020   8:33 AM CDT  To: Talia Gustafson MA    Please call, wants to do a \" small intestine test\"           "

## 2020-08-04 ENCOUNTER — OFFICE VISIT (OUTPATIENT)
Dept: GASTROENTEROLOGY | Facility: CLINIC | Age: 60
End: 2020-08-04

## 2020-08-04 VITALS — BODY MASS INDEX: 28.41 KG/M2 | HEIGHT: 63 IN

## 2020-08-04 DIAGNOSIS — K59.04 CHRONIC IDIOPATHIC CONSTIPATION: Primary | ICD-10-CM

## 2020-08-04 PROCEDURE — 99442 PR PHYS/QHP TELEPHONE EVALUATION 11-20 MIN: CPT | Performed by: INTERNAL MEDICINE

## 2020-08-04 NOTE — PATIENT INSTRUCTIONS

## 2020-08-04 NOTE — PROGRESS NOTES
[facility]      Chief Complaint:   Chief Complaint   Patient presents with   • Bloated   This visit has been rescheduled as a phone visit to comply with patient safety concerns in accordance with CDC recommendations. Total time of discussion was 20minutes.  You have chosen to receive care through a telephone visit. Do you consent to use a telephone visit for your medical care today? Yes    Subjective     HPI:   Patient with continued distended abdomen.  Patient has CT scan of the abdomen which shows a large amount of stool in the colon which is most likely cause of the patient's distended abdomen.  This could be secondary to patient's pain medications or her diabetic gastroparesis and poor control of her sugar.  We will start patient on Trulance see if any improvement in abdominal distention and bowel movements as this will increase fluid in her intestine and help to eliminate some of the stool from the colon.  Patient follow-up with [facility] following a trial on this medication.    Plan; we will have patient follow-up in 3 months    Past Medical History:   Past Medical History:   Diagnosis Date   • Alcoholic fatty liver    • Alkaline phosphatase raised    • Anxiety    • Asthma     IgE-MEDIATED ALLERGIC ASTHMA   • Backache     CHRONIC   • CHF (congestive heart failure) (CMS/HCC)    • Chronic hepatitis C (CMS/HCC)     2b. Fibrosure .05/F0, necroinflam .14/A0. Repeat .05/F0, .13/A0      • Chronic neck pain    • Constipation    • COPD (chronic obstructive pulmonary disease) (CMS/HCC)    • Diabetes (CMS/HCC)    • Diarrhea    • Elevated levels of transaminase & lactic acid dehydrogenase    • Emphysema, unspecified (CMS/HCC)    • Generalized abdominal pain    • GERD (gastroesophageal reflux disease)     WITH ESOPHAGITIS   • Hepatitis    • High risk sexual behavior    • Hypertension    • Hypokalemia    • Irritable bowel syndrome (IBS)    • Multiple joint pain    • Need for vaccination    • Numbness  and tingling in left arm    • On long term drug therapy    • Shoulder pain    • Tobacco dependence syndrome        Past Surgical History:  Past Surgical History:   Procedure Laterality Date   • ANTERIOR CERVICAL DISCECTOMY W/ FUSION N/A 5/13/2019    Procedure: EXPLORATION OF FUSION C5-6, ANTERIOR CERVICAL DISCECTOMY FUSION C3-5 WITH INSTRUMENTATION C3-6;  Surgeon: YINKA Sanders MD;  Location: [facility];  Service: Orthopedic Spine   • APPENDECTOMY     • CERVICAL FUSION  1999    C5-6   • CHOLECYSTECTOMY     • COLONOSCOPY  02/08/2016   • COLONOSCOPY N/A 8/16/2019    Procedure: COLONOSCOPY;  Surgeon: Kendrick Ruiz MD;  Location: [facility] ENDOSCOPY;  Service: Gastroenterology   • COLONOSCOPY W/ POLYPECTOMY  02/08/2016    External and internal hemorrhoids.The examination was otherwise normal.Fluid aspiration performed.Several biopsies obtained in the entire colon.   • ENDOSCOPY  02/08/2016    Mildly severe esophagitis.Gastritis.Normal examined duodenum.Several biopsies obtained in the lower third of the esophagus.Several biopsies obtained in the gastric antrum.Several biopsies obtained in the first part of the duodenum.   • ENDOSCOPY  08/13/2012    Esophagitis seen. Biopsy taken. Gastritis in stomach. Biopsy taken. Normal duodenum. Biopsy taken.   • ENDOSCOPY N/A 8/16/2019    Procedure: ESOPHAGOGASTRODUODENOSCOPY;  Surgeon: Kendrick Ruiz MD;  Location: [facility] ENDOSCOPY;  Service: Gastroenterology   • ENDOSCOPY AND COLONOSCOPY  08/13/2012    Internal & external hemorrhoids found. Scope could not pass into the TI.   • FINGER SURGERY Left 04/2019    thumb - piece of metal removed   • HARDWARE REMOVAL N/A 5/13/2019    Procedure: REMOVAL OF INSTRUMENTATION;  Surgeon: YINKA Sanders MD;  Location: [facility];  Service: Orthopedic Spine   • HEMORRHOIDECTOMY     • HERNIA REPAIR      pt states no hernia was found so no procedure was done so they closed her back up   • INJECTION OF MEDICATION  08/01/2013     METHYLPREDNISONE, X2   • INJECTION OF MEDICATION  11/03/2011    KENALOG   • INJECTION OF MEDICATION  02/24/2011    ROCEPHIN   • KNEE SURGERY Right    • TONSILLECTOMY AND ADENOIDECTOMY     • TOTAL ABDOMINAL HYSTERECTOMY WITH SALPINGO OOPHORECTOMY     • UPPER GASTROINTESTINAL ENDOSCOPY  02/08/2016   • UPPER GASTROINTESTINAL ENDOSCOPY  08/16/2019       Family History:  Family History   Problem Relation Age of Onset   • Coronary artery disease Mother    • Diabetes Mother    • Heart failure Mother    • Cancer Father    • Diabetes Father    • Heart failure Father    • Thyroid disease Father    • Breast cancer Sister    • Cancer Other         COLORECTAL   • Endometrial cancer Other    • Ovarian cancer Other    • Diabetes Brother        Social History:   reports that she has been smoking cigarettes. She has a 11.25 pack-year smoking history. She has never used smokeless tobacco. She reports that she does not drink alcohol or use drugs.    Medications:   Prior to Admission medications    Medication Sig Start Date End Date Taking? Authorizing Provider   amitriptyline (ELAVIL) 100 MG tablet Take 200 mg by mouth Every Night.   Yes ProviderModesta MD   amLODIPine (NORVASC) 5 MG tablet Take 5 mg by mouth Daily.   Yes ProviderModesta MD   cetirizine (zyrTEC) 10 MG tablet Take 10 mg by mouth Daily.   Yes ProviderModesta MD   fluconazole (DIFLUCAN) 100 MG tablet Take 100 mg by mouth 1 (One) Time.   Yes ProviderModesta MD   fluticasone (FLONASE) 50 MCG/ACT nasal spray 1 spray into each nostril Daily. 2/13/17  Yes Modesta Chaudhary MD   Fluticasone Furoate-Vilanterol (BREO ELLIPTA) 100-25 MCG/INH inhaler Inhale 1 puff Daily. 3/27/19  Yes Denisse Marcano MD   furosemide (LASIX) 20 MG tablet Take 20 mg by mouth Daily.   Yes ProviderModesta MD   gemfibrozil (LOPID) 600 MG tablet Take 600 mg by mouth 2 (Two) Times a Day Before Meals.   Yes ProviderModesta MD   glyBURIDE (DIAbeta) 2.5 MG tablet  Take 2.5 mg by mouth 2 (Two) Times a Day With Meals.   Yes Modesta Chaudhary MD   HYDROcodone-acetaminophen (NORCO) 7.5-325 MG per tablet Take 1 tablet by mouth 3 (Three) Times a Day.   Yes Modesta Chaudhary MD   Insulin Glargine (BASAGLAR KWIKPEN) 100 UNIT/ML injection pen Inject 35 Units under the skin into the appropriate area as directed.   Yes Modesta Chaudhary MD   JANUMET XR  MG tablet sustained-release 24 hour Take 1 tablet by mouth 2 (Two) Times a Day. 2/2/17  Yes Modesta Chaudhary MD   metoclopramide (REGLAN) 10 MG tablet Take 10 mg by mouth As Needed (nausea).   Yes Modesta Chaudhary MD   montelukast (SINGULAIR) 10 MG tablet Take 1 tablet by mouth Every Night. 2/23/17  Yes Denisse Marcano MD   omeprazole (priLOSEC) 40 MG capsule Take 1 capsule by mouth 2 (Two) Times a Day. 11/11/19  Yes Genaro Franco PA-C   potassium chloride (K-DUR,KLOR-CON) 10 MEQ CR tablet Take 10 mEq by mouth 2 (Two) Times a Day.   Yes Modesta Chaudhary MD   pravastatin (PRAVACHOL) 20 MG tablet Take 20 mg by mouth Every Night.   Yes ProviderModesta MD   sennosides-docusate sodium (SENOKOT-S) 8.6-50 MG tablet Take 2 tablets by mouth Every Night.   Yes ProviderModesta MD   Plecanatide (Trulance) 3 MG tablet Take 1 tablet by mouth Daily. 8/4/20   Kendrick Ruiz MD       Allergies:  Doxycycline and Penicillins    ROS:    Review of Systems   Constitutional: Negative for activity change, appetite change, chills, diaphoresis, fatigue, fever and unexpected weight change.   HENT: Negative for sore throat and trouble swallowing.    Respiratory: Negative for shortness of breath.    Gastrointestinal: Positive for constipation. Negative for abdominal distention, abdominal pain, anal bleeding, blood in stool, diarrhea, nausea, rectal pain and vomiting.   Endocrine: Negative for polydipsia, polyphagia and polyuria.   Genitourinary: Negative for difficulty urinating.   Musculoskeletal: Negative for  "arthralgias.   Skin: Negative for pallor.   Allergic/Immunologic: Negative for food allergies.   Neurological: Negative for weakness and light-headedness.   Psychiatric/Behavioral: Negative for behavioral problems.     Objective     Height 160 cm (63\"), not currently breastfeeding.    Physical Exam   Constitutional: She is oriented to person, place, and time. She appears well-developed and well-nourished. No distress.   HENT:   Head: Normocephalic and atraumatic.   Cardiovascular: Normal rate, regular rhythm, normal heart sounds and intact distal pulses. Exam reveals no gallop and no friction rub.   No murmur heard.  Pulmonary/Chest: Breath sounds normal. No respiratory distress. She has no wheezes. She has no rales. She exhibits no tenderness.   Abdominal: Soft. Bowel sounds are normal. She exhibits no distension and no mass. There is no tenderness. There is no rebound and no guarding. No hernia.   Musculoskeletal: Normal range of motion. She exhibits no edema.   Neurological: She is alert and oriented to person, place, and time.   Skin: Skin is warm and dry. No rash noted. She is not diaphoretic. No erythema. No pallor.   Psychiatric: She has a normal mood and affect. Her behavior is normal. Judgment and thought content normal.        Assessment/Plan   Marguerite was seen today for bloated.    Diagnoses and all orders for this visit:    Chronic idiopathic constipation    Other orders  -     Plecanatide (Trulance) 3 MG tablet; Take 1 tablet by mouth Daily.        * Surgery not found *     Diagnosis Plan   1. Chronic idiopathic constipation         Anticipated Surgical Procedure:  No orders of the defined types were placed in this encounter.      The risks, benefits, and alternatives of this procedure have been discussed with the patient or the responsible party- the patient understands and agrees to proceed.                                                                "

## 2020-08-05 ENCOUNTER — DOCUMENTATION (OUTPATIENT)
Dept: GASTROENTEROLOGY | Facility: CLINIC | Age: 60
End: 2020-08-05

## 2020-08-05 RX ORDER — LUBIPROSTONE 24 UG/1
24 CAPSULE ORAL 2 TIMES DAILY WITH MEALS
COMMUNITY
End: 2020-09-01 | Stop reason: ALTCHOICE

## 2020-08-05 NOTE — PROGRESS NOTES
LORENZA CASTANEDA (Key: [id number]) - [id number]  Trulance 3MG tablets  Status: PA Request  Created: August 5th, 2020  Sent: August 5th, 2020

## 2020-08-12 ENCOUNTER — TELEPHONE (OUTPATIENT)
Dept: GASTROENTEROLOGY | Facility: CLINIC | Age: 60
End: 2020-08-12

## 2020-08-12 ENCOUNTER — DOCUMENTATION (OUTPATIENT)
Dept: GASTROENTEROLOGY | Facility: CLINIC | Age: 60
End: 2020-08-12

## 2020-08-12 NOTE — PROGRESS NOTES
LORENZA CASTANEDA (Key: [id number]) - [id number]  Trulance 3MG OR TABS  Status: PA Request  Created: August 12th, 2020  Sent: August 12th, 2020

## 2020-08-12 NOTE — TELEPHONE ENCOUNTER
----- Message from Luis Salgado sent at 8/12/2020  3:27 PM CDT -----  Contact: [phone]  Patient called; was given prescription for Amitiza 24mcg    Makes her pass gas, but cannot have a bowel movement- been 3-4 days.    Does she need to take the Senokot as well?        PT WAS GIVEN (Trulance) 3 MG tablet   ON 08/04/2020.    I TRIED CALLING PT @ [phone] BUT THIS NUMBER IS NO LONGER A WORKING NUMBER.      I WAS ABLE TO CONTACT PT AND I ASKED HER TO COME GET SAMPLES FOR 2 WEEKS WHILE I SUBMIT ANOTHER PA.

## 2020-08-18 ENCOUNTER — TELEPHONE (OUTPATIENT)
Dept: GASTROENTEROLOGY | Facility: CLINIC | Age: 60
End: 2020-08-18

## 2020-08-18 RX ORDER — LACTULOSE 10 G/15ML
10 SOLUTION ORAL 3 TIMES DAILY
Qty: 946 ML | Refills: 0 | Status: SHIPPED | OUTPATIENT
Start: 2020-08-18 | End: 2020-08-24 | Stop reason: ALTCHOICE

## 2020-08-18 NOTE — TELEPHONE ENCOUNTER
(Trulance) 3 MG tablet was not covered.  I asked pt to try lactulose (CHRONULAC) 10 GM/15ML solution per .    Pt is aware and stated she has a upcoming apt with  and she will let him know how well the medication worked.

## 2020-08-24 ENCOUNTER — TELEPHONE (OUTPATIENT)
Dept: GASTROENTEROLOGY | Facility: CLINIC | Age: 60
End: 2020-08-24

## 2020-08-24 NOTE — TELEPHONE ENCOUNTER
LORENZA CASTANEDA (Key: [id number]) - [id number]  Trulance 3MG OR TABS  Status: PA Request  Created: August 21st, 2020  Sent: August 24th, 2020

## 2020-09-01 ENCOUNTER — OFFICE VISIT (OUTPATIENT)
Dept: GASTROENTEROLOGY | Facility: CLINIC | Age: 60
End: 2020-09-01

## 2020-09-01 VITALS
HEART RATE: 118 BPM | WEIGHT: 164.4 LBS | HEIGHT: 63 IN | BODY MASS INDEX: 29.13 KG/M2 | SYSTOLIC BLOOD PRESSURE: 140 MMHG | DIASTOLIC BLOOD PRESSURE: 80 MMHG | OXYGEN SATURATION: 96 %

## 2020-09-01 DIAGNOSIS — R14.0 ABDOMINAL DISTENTION: Primary | ICD-10-CM

## 2020-09-01 PROCEDURE — 99214 OFFICE O/P EST MOD 30 MIN: CPT | Performed by: INTERNAL MEDICINE

## 2020-09-01 RX ORDER — AMOXICILLIN 250 MG
2 CAPSULE ORAL NIGHTLY
Qty: 60 TABLET | Refills: 5 | Status: SHIPPED | OUTPATIENT
Start: 2020-09-01

## 2020-09-01 NOTE — PATIENT INSTRUCTIONS
"  BMI for Adults    Body mass index (BMI) is a number that is calculated from a person's weight and height. BMI may help to estimate how much of a person's weight is composed of fat. BMI can help identify those who may be at higher risk for certain medical problems.  How is BMI used with adults?  BMI is used as a screening tool to identify possible weight problems. It is used to check whether a person is obese, overweight, healthy weight, or underweight.  How is BMI calculated?  BMI measures your weight and compares it to your height. This can be done either in English ([place] or metric measurements. Note that charts are available to help you find your BMI quickly and easily without having to do these calculations yourself.  To calculate your BMI in English ([place] measurements, your health care provider will:  1. Measure your weight in pounds (lb).  2. Multiply the number of pounds by 703.  ? For example, for a person who weighs 180 lb, multiply that number by 703, which equals 126,540.  3. Measure your height in inches (in). Then multiply that number by itself to get a measurement called \"inches squared.\"  ? For example, for a person who is 70 in tall, the \"inches squared\" measurement is 70 in x 70 in, which equals 4900 inches squared.  4. Divide the total from Step 2 (number of lb x 703) by the total from Step 3 (inches squared): 126,540 ÷ 4900 = 25.8. This is your BMI.  To calculate your BMI in metric measurements, your health care provider will:  1. Measure your weight in kilograms (kg).  2. Measure your height in meters (m). Then multiply that number by itself to get a measurement called \"meters squared.\"  ? For example, for a person who is 1.75 m tall, the \"meters squared\" measurement is 1.75 m x 1.75 m, which is equal to 3.1 meters squared.  3. Divide the number of kilograms (your weight) by the meters squared number. In this example: 70 ÷ 3.1 = 22.6. This is your BMI.  How is BMI interpreted?  To interpret " your results, your health care provider will use BMI charts to identify whether you are underweight, normal weight, overweight, or obese. The following guidelines will be used:  · Underweight: BMI less than 18.5.  · Normal weight: BMI between 18.5 and 24.9.  · Overweight: BMI between 25 and 29.9.  · Obese: BMI of 30 and above.  Please note:  · Weight includes both fat and muscle, so someone with a muscular build, such as an athlete, may have a BMI that is higher than 24.9. In cases like these, BMI is not an accurate measure of body fat.  · To determine if excess body fat is the cause of a BMI of 25 or higher, further assessments may need to be done by a health care provider.  · BMI is usually interpreted in the same way for men and women.  Why is BMI a useful tool?  BMI is useful in two ways:  · Identifying a weight problem that may be related to a medical condition, or that may increase the risk for medical problems.  · Promoting lifestyle and diet changes in order to reach a healthy weight.  Summary  · Body mass index (BMI) is a number that is calculated from a person's weight and height.  · BMI may help to estimate how much of a person's weight is composed of fat. BMI can help identify those who may be at higher risk for certain medical problems.  · BMI can be measured using English measurements or metric measurements.  · To interpret your results, your health care provider will use BMI charts to identify whether you are underweight, normal weight, overweight, or obese.  This information is not intended to replace advice given to you by your health care provider. Make sure you discuss any questions you have with your health care provider.  Document Released: 08/29/2005 Document Revised: 11/30/2018 Document Reviewed: 10/31/2018  [employer] Patient Education © 2020 [employer]

## 2020-09-01 NOTE — PROGRESS NOTES
[facility]      Chief Complaint:   Chief Complaint   Patient presents with   • Constipation       Subjective     HPI:   Patient with continued abdominal distention.  Patient states that is causing her severe discomfort.  Patient has severe constipation currently taking Trulance with only limited success.  Discussed with patient she can also take the Senokot.  Patient also with continued smoking and diabetes with poorly controlled sugar.    Plan; discussed with patient that she needs to stop smoking and to control her sugars.  We will get a gastric emptying study to see if she has gastroparesis this is very likely.  We will also do an ultrasound of the abdomen with Dopplers of the mesenteric vessels to see if any blockages of these vessels causing patient's abdominal discomfort and distention.    Past Medical History:   Past Medical History:   Diagnosis Date   • Alcoholic fatty liver    • Alkaline phosphatase raised    • Anxiety    • Asthma     IgE-MEDIATED ALLERGIC ASTHMA   • Backache     CHRONIC   • CHF (congestive heart failure) (CMS/HCC)    • Chronic hepatitis C (CMS/HCC)     2b. Fibrosure .05/F0, necroinflam .14/A0. Repeat .05/F0, .13/A0      • Chronic neck pain    • Constipation    • COPD (chronic obstructive pulmonary disease) (CMS/HCC)    • Diabetes (CMS/HCC)    • Diarrhea    • Elevated levels of transaminase & lactic acid dehydrogenase    • Emphysema, unspecified (CMS/HCC)    • Generalized abdominal pain    • GERD (gastroesophageal reflux disease)     WITH ESOPHAGITIS   • Hepatitis    • High risk sexual behavior    • Hypertension    • Hypokalemia    • Irritable bowel syndrome (IBS)    • Multiple joint pain    • Need for vaccination    • Numbness and tingling in left arm    • On long term drug therapy    • Shoulder pain    • Tobacco dependence syndrome        Past Surgical History:  Past Surgical History:   Procedure Laterality Date   • ANTERIOR CERVICAL DISCECTOMY W/ FUSION N/A  5/13/2019    Procedure: EXPLORATION OF FUSION C5-6, ANTERIOR CERVICAL DISCECTOMY FUSION C3-5 WITH INSTRUMENTATION C3-6;  Surgeon: YINKA Sanders MD;  Location: [facility] OR;  Service: Orthopedic Spine   • APPENDECTOMY     • CERVICAL FUSION  1999    C5-6   • CHOLECYSTECTOMY     • COLONOSCOPY  02/08/2016   • COLONOSCOPY N/A 8/16/2019    Procedure: COLONOSCOPY;  Surgeon: Kendrick Ruiz MD;  Location: [facility] ENDOSCOPY;  Service: Gastroenterology   • COLONOSCOPY W/ POLYPECTOMY  02/08/2016    External and internal hemorrhoids.The examination was otherwise normal.Fluid aspiration performed.Several biopsies obtained in the entire colon.   • ENDOSCOPY  02/08/2016    Mildly severe esophagitis.Gastritis.Normal examined duodenum.Several biopsies obtained in the lower third of the esophagus.Several biopsies obtained in the gastric antrum.Several biopsies obtained in the first part of the duodenum.   • ENDOSCOPY  08/13/2012    Esophagitis seen. Biopsy taken. Gastritis in stomach. Biopsy taken. Normal duodenum. Biopsy taken.   • ENDOSCOPY N/A 8/16/2019    Procedure: ESOPHAGOGASTRODUODENOSCOPY;  Surgeon: Kendrick Ruiz MD;  Location: [facility] ENDOSCOPY;  Service: Gastroenterology   • ENDOSCOPY AND COLONOSCOPY  08/13/2012    Internal & external hemorrhoids found. Scope could not pass into the TI.   • FINGER SURGERY Left 04/2019    thumb - piece of metal removed   • HARDWARE REMOVAL N/A 5/13/2019    Procedure: REMOVAL OF INSTRUMENTATION;  Surgeon: YINKA Sanders MD;  Location: [facility];  Service: Orthopedic Spine   • HEMORRHOIDECTOMY     • HERNIA REPAIR      pt states no hernia was found so no procedure was done so they closed her back up   • INJECTION OF MEDICATION  08/01/2013    METHYLPREDNISONE, X2   • INJECTION OF MEDICATION  11/03/2011    KENALOG   • INJECTION OF MEDICATION  02/24/2011    ROCEPHIN   • KNEE SURGERY Right    • TONSILLECTOMY AND ADENOIDECTOMY     • TOTAL ABDOMINAL HYSTERECTOMY WITH SALPINGO OOPHORECTOMY      • UPPER GASTROINTESTINAL ENDOSCOPY  02/08/2016   • UPPER GASTROINTESTINAL ENDOSCOPY  08/16/2019       Family History:  Family History   Problem Relation Age of Onset   • Coronary artery disease Mother    • Diabetes Mother    • Heart failure Mother    • Cancer Father    • Diabetes Father    • Heart failure Father    • Thyroid disease Father    • Breast cancer Sister    • Cancer Other         COLORECTAL   • Endometrial cancer Other    • Ovarian cancer Other    • Diabetes Brother        Social History:   reports that she has been smoking cigarettes. She has a 11.25 pack-year smoking history. She has never used smokeless tobacco. She reports that she does not drink alcohol or use drugs.    Medications:   Prior to Admission medications    Medication Sig Start Date End Date Taking? Authorizing Provider   amitriptyline (ELAVIL) 100 MG tablet Take 200 mg by mouth Every Night.   Yes Modesta Chaudhary MD   amLODIPine (NORVASC) 5 MG tablet Take 5 mg by mouth Daily.   Yes Modesta Chaudhary MD   cetirizine (zyrTEC) 10 MG tablet Take 10 mg by mouth Daily.   Yes Modesta Chaudhary MD   fluconazole (DIFLUCAN) 100 MG tablet Take 100 mg by mouth 1 (One) Time.   Yes Modesta Chaudhary MD   fluticasone (FLONASE) 50 MCG/ACT nasal spray 1 spray into each nostril Daily. 2/13/17  Yes Modesta Chaudhary MD   Fluticasone Furoate-Vilanterol (BREO ELLIPTA) 100-25 MCG/INH inhaler Inhale 1 puff Daily. 3/27/19  Yes Denisse Marcano MD   furosemide (LASIX) 20 MG tablet Take 20 mg by mouth 2 (two) times a day.   Yes Modesta Chaudhary MD   gemfibrozil (LOPID) 600 MG tablet Take 600 mg by mouth 2 (Two) Times a Day Before Meals.   Yes Modesta Chaudhary MD   glyBURIDE (DIAbeta) 2.5 MG tablet Take 2.5 mg by mouth 2 (Two) Times a Day With Meals.   Yes Modesta Chaudhary MD   HYDROcodone-acetaminophen (NORCO) 7.5-325 MG per tablet Take 1 tablet by mouth 3 (Three) Times a Day.   Yes Modesta Chaudhary MD   Insulin Glargine  (BASAGLAR KWIKPEN) 100 UNIT/ML injection pen Inject 35 Units under the skin into the appropriate area as directed.   Yes Modesta Chaudhary MD   JANUMET XR  MG tablet sustained-release 24 hour Take 1 tablet by mouth 2 (Two) Times a Day. 2/2/17  Yes Modesta Chaudhary MD   metoclopramide (REGLAN) 10 MG tablet Take 10 mg by mouth As Needed (nausea).   Yes Modesta Chaudhary MD   montelukast (SINGULAIR) 10 MG tablet Take 1 tablet by mouth Every Night. 2/23/17  Yes Denisse Marcano MD   omeprazole (priLOSEC) 40 MG capsule Take 1 capsule by mouth 2 (Two) Times a Day. 11/11/19  Yes Genaro Franco PA-C   Plecanatide (Trulance) 3 MG tablet Take 1 tablet by mouth Daily. 8/24/20  Yes Kendrick Ruiz MD   potassium chloride (K-DUR,KLOR-CON) 10 MEQ CR tablet Take 30 mEq by mouth 2 (Two) Times a Day.   Yes Modesta Chaudhary MD   pravastatin (PRAVACHOL) 20 MG tablet Take 20 mg by mouth Every Night.   Yes Modesta Chaudhary MD   sennosides-docusate (senna-docusate sodium) 8.6-50 MG per tablet Take 2 tablets by mouth Every Night. 9/1/20  Yes Kendrick Ruiz MD   lubiprostone (Amitiza) 24 MCG capsule Take 24 mcg by mouth 2 (Two) Times a Day With Meals.  9/1/20 Yes Modesta Chaudhary MD   sennosides-docusate sodium (SENOKOT-S) 8.6-50 MG tablet Take 2 tablets by mouth Every Night.  9/1/20 Yes Modesta Chaudhary MD       Allergies:  Doxycycline and Penicillins    ROS:    Review of Systems   Constitutional: Negative for activity change, appetite change, chills, diaphoresis, fatigue, fever and unexpected weight change.   HENT: Negative for sore throat and trouble swallowing.    Respiratory: Negative for shortness of breath.    Gastrointestinal: Negative for abdominal distention, abdominal pain, anal bleeding, blood in stool, constipation, diarrhea, nausea, rectal pain and vomiting.   Endocrine: Negative for polydipsia, polyphagia and polyuria.   Genitourinary: Negative for difficulty urinating.  "  Musculoskeletal: Negative for arthralgias.   Skin: Negative for pallor.   Allergic/Immunologic: Negative for food allergies.   Neurological: Negative for weakness and light-headedness.   Psychiatric/Behavioral: Negative for behavioral problems.     Objective     Blood pressure 140/80, pulse 118, height 160 cm (63\"), weight 74.6 kg (164 lb 6.4 oz), SpO2 96 %, not currently breastfeeding.    Physical Exam   Constitutional: She is oriented to person, place, and time. She appears well-developed and well-nourished. No distress.   HENT:   Head: Normocephalic and atraumatic.   Cardiovascular: Normal rate, regular rhythm, normal heart sounds and intact distal pulses. Exam reveals no gallop and no friction rub.   No murmur heard.  Pulmonary/Chest: Breath sounds normal. No respiratory distress. She has no wheezes. She has no rales. She exhibits no tenderness.   Abdominal: Soft. Bowel sounds are normal. She exhibits distension. She exhibits no mass. There is tenderness. There is no rebound and no guarding. No hernia.   Musculoskeletal: Normal range of motion. She exhibits no edema.   Neurological: She is alert and oriented to person, place, and time.   Skin: Skin is warm and dry. No rash noted. She is not diaphoretic. No erythema. No pallor.   Psychiatric: She has a normal mood and affect. Her behavior is normal. Judgment and thought content normal.        Assessment/Plan   Marguerite was seen today for constipation.    Diagnoses and all orders for this visit:    Abdominal distention  -     NM Gastric Emptying; Future  -     US Abdomen Complete; Future    Other orders  -     sennosides-docusate (senna-docusate sodium) 8.6-50 MG per tablet; Take 2 tablets by mouth Every Night.        * Surgery not found *     Diagnosis Plan   1. Abdominal distention  NM Gastric Emptying    US Abdomen Complete       Anticipated Surgical Procedure:  Orders Placed This Encounter   Procedures   • NM Gastric Emptying     Standing Status:   Future     " Standing Expiration Date:   9/1/2021   • US Abdomen Complete     Standing Status:   Future     Standing Expiration Date:   9/1/2021     Scheduling Instructions:      With dopplers of mesenteric     Order Specific Question:   Reason for Exam:     Answer:   abd distention       The risks, benefits, and alternatives of this procedure have been discussed with the patient or the responsible party- the patient understands and agrees to proceed.

## 2020-09-11 ENCOUNTER — HOSPITAL ENCOUNTER (OUTPATIENT)
Dept: ULTRASOUND IMAGING | Facility: HOSPITAL | Age: 60
Discharge: HOME | End: 2020-09-11

## 2020-09-11 ENCOUNTER — HOSPITAL ENCOUNTER (OUTPATIENT)
Dept: NUCLEAR MEDICINE | Facility: HOSPITAL | Age: 60
Discharge: HOME | End: 2020-09-11

## 2020-09-11 DIAGNOSIS — R14.0 ABDOMINAL DISTENTION: ICD-10-CM

## 2020-09-11 PROCEDURE — 78264 GASTRIC EMPTYING IMG STUDY: CPT

## 2020-09-11 PROCEDURE — 93976 VASCULAR STUDY: CPT

## 2020-09-11 PROCEDURE — 0 TECHNETIUM SULFUR COLLOID: Performed by: INTERNAL MEDICINE

## 2020-09-11 PROCEDURE — 76700 US EXAM ABDOM COMPLETE: CPT

## 2020-09-11 PROCEDURE — A9541 TC99M SULFUR COLLOID: HCPCS | Performed by: INTERNAL MEDICINE

## 2020-09-11 RX ADMIN — TECHNETIUM TC 99M SULFUR COLLOID 1 DOSE: KIT at 07:26

## 2020-10-02 ENCOUNTER — OFFICE VISIT (OUTPATIENT)
Dept: GASTROENTEROLOGY | Facility: CLINIC | Age: 60
End: 2020-10-02

## 2020-10-02 VITALS
BODY MASS INDEX: 29.62 KG/M2 | HEIGHT: 63 IN | DIASTOLIC BLOOD PRESSURE: 81 MMHG | HEART RATE: 110 BPM | OXYGEN SATURATION: 98 % | SYSTOLIC BLOOD PRESSURE: 123 MMHG | WEIGHT: 167.2 LBS

## 2020-10-02 DIAGNOSIS — R10.13 EPIGASTRIC PAIN: Primary | ICD-10-CM

## 2020-10-02 PROCEDURE — 99214 OFFICE O/P EST MOD 30 MIN: CPT | Performed by: INTERNAL MEDICINE

## 2020-10-02 NOTE — PATIENT INSTRUCTIONS

## 2020-10-02 NOTE — PROGRESS NOTES
[facility]      Chief Complaint:   Chief Complaint   Patient presents with   • Follow-up     Gastric Emptying       Subjective     HPI:   Patient with continued abdominal pain.  Patient had an ultrasound of the abdomen with Dopplers of the mesenteric vessels which shows significant decrease in flow.  Patient continues to smoke and has diabetes and is eating poorly.  Discussed with patient that she needs to improve her diet and quit smoking patient will also need follow-up with vascular surgery for evaluation for possible surgical repair of these vessels.    Plan; have patient follow-up with me after evaluation by surgery    Past Medical History:   Past Medical History:   Diagnosis Date   • Alcoholic fatty liver    • Alkaline phosphatase raised    • Anxiety    • Asthma     IgE-MEDIATED ALLERGIC ASTHMA   • Backache     CHRONIC   • CHF (congestive heart failure) (CMS/HCC)    • Chronic hepatitis C (CMS/HCC)     2b. Fibrosure .05/F0, necroinflam .14/A0. Repeat .05/F0, .13/A0      • Chronic neck pain    • Constipation    • COPD (chronic obstructive pulmonary disease) (CMS/HCC)    • Diabetes (CMS/HCC)    • Diarrhea    • Elevated levels of transaminase & lactic acid dehydrogenase    • Emphysema, unspecified (CMS/HCC)    • Generalized abdominal pain    • GERD (gastroesophageal reflux disease)     WITH ESOPHAGITIS   • Hepatitis    • High risk sexual behavior    • Hypertension    • Hypokalemia    • Irritable bowel syndrome (IBS)    • Multiple joint pain    • Need for vaccination    • Numbness and tingling in left arm    • On long term drug therapy    • Shoulder pain    • Tobacco dependence syndrome        Past Surgical History:  Past Surgical History:   Procedure Laterality Date   • ANTERIOR CERVICAL DISCECTOMY W/ FUSION N/A 5/13/2019    Procedure: EXPLORATION OF FUSION C5-6, ANTERIOR CERVICAL DISCECTOMY FUSION C3-5 WITH INSTRUMENTATION C3-6;  Surgeon: YINKA Sanders MD;  Location: [facility] OR;   Service: Orthopedic Spine   • APPENDECTOMY     • CERVICAL FUSION  1999    C5-6   • CHOLECYSTECTOMY     • COLONOSCOPY  02/08/2016   • COLONOSCOPY N/A 8/16/2019    Procedure: COLONOSCOPY;  Surgeon: Kendrick Ruiz MD;  Location: [facility] ENDOSCOPY;  Service: Gastroenterology   • COLONOSCOPY W/ POLYPECTOMY  02/08/2016    External and internal hemorrhoids.The examination was otherwise normal.Fluid aspiration performed.Several biopsies obtained in the entire colon.   • ENDOSCOPY  02/08/2016    Mildly severe esophagitis.Gastritis.Normal examined duodenum.Several biopsies obtained in the lower third of the esophagus.Several biopsies obtained in the gastric antrum.Several biopsies obtained in the first part of the duodenum.   • ENDOSCOPY  08/13/2012    Esophagitis seen. Biopsy taken. Gastritis in stomach. Biopsy taken. Normal duodenum. Biopsy taken.   • ENDOSCOPY N/A 8/16/2019    Procedure: ESOPHAGOGASTRODUODENOSCOPY;  Surgeon: Kendrick Ruiz MD;  Location: [facility] ENDOSCOPY;  Service: Gastroenterology   • ENDOSCOPY AND COLONOSCOPY  08/13/2012    Internal & external hemorrhoids found. Scope could not pass into the TI.   • FINGER SURGERY Left 04/2019    thumb - piece of metal removed   • HARDWARE REMOVAL N/A 5/13/2019    Procedure: REMOVAL OF INSTRUMENTATION;  Surgeon: YINKA Sanders MD;  Location: [facility] OR;  Service: Orthopedic Spine   • HEMORRHOIDECTOMY     • HERNIA REPAIR      pt states no hernia was found so no procedure was done so they closed her back up   • INJECTION OF MEDICATION  08/01/2013    METHYLPREDNISONE, X2   • INJECTION OF MEDICATION  11/03/2011    KENALOG   • INJECTION OF MEDICATION  02/24/2011    ROCEPHIN   • KNEE SURGERY Right    • TONSILLECTOMY AND ADENOIDECTOMY     • TOTAL ABDOMINAL HYSTERECTOMY WITH SALPINGO OOPHORECTOMY     • UPPER GASTROINTESTINAL ENDOSCOPY  02/08/2016   • UPPER GASTROINTESTINAL ENDOSCOPY  08/16/2019       Family History:  Family History   Problem Relation Age of Onset   •  Coronary artery disease Mother    • Diabetes Mother    • Heart failure Mother    • Cancer Father    • Diabetes Father    • Heart failure Father    • Thyroid disease Father    • Breast cancer Sister    • Cancer Other         COLORECTAL   • Endometrial cancer Other    • Ovarian cancer Other    • Diabetes Brother        Social History:   reports that she has been smoking cigarettes. She has a 11.25 pack-year smoking history. She has never used smokeless tobacco. She reports that she does not drink alcohol or use drugs.    Medications:   Prior to Admission medications    Medication Sig Start Date End Date Taking? Authorizing Provider   amitriptyline (ELAVIL) 100 MG tablet Take 200 mg by mouth Every Night.   Yes Modesta Chaudhary MD   amLODIPine (NORVASC) 5 MG tablet Take 5 mg by mouth Daily.   Yes Modesta Chaudhary MD   cetirizine (zyrTEC) 10 MG tablet Take 10 mg by mouth Daily.   Yes Modesta Chaudhary MD   fluconazole (DIFLUCAN) 100 MG tablet Take 100 mg by mouth 1 (One) Time.   Yes Modesta Chaudhary MD   fluticasone (FLONASE) 50 MCG/ACT nasal spray 1 spray into each nostril Daily. 2/13/17  Yes Modesta Chaudhary MD   Fluticasone Furoate-Vilanterol (BREO ELLIPTA) 100-25 MCG/INH inhaler Inhale 1 puff Daily. 3/27/19  Yes Denisse Marcano MD   furosemide (LASIX) 20 MG tablet Take 20 mg by mouth 2 (two) times a day.   Yes Modesta Chaudhary MD   gemfibrozil (LOPID) 600 MG tablet Take 600 mg by mouth 2 (Two) Times a Day Before Meals.   Yes Modesta Chaudhary MD   glyBURIDE (DIAbeta) 2.5 MG tablet Take 2.5 mg by mouth 2 (Two) Times a Day With Meals.   Yes Modesta Chaudhary MD   HYDROcodone-acetaminophen (NORCO) 7.5-325 MG per tablet Take 1 tablet by mouth 3 (Three) Times a Day.   Yes Modesta Chaudhary MD   Insulin Glargine (BASAGLAR KWIKPEN) 100 UNIT/ML injection pen Inject 35 Units under the skin into the appropriate area as directed.   Yes Modesta Chaudhary MD   JANUMET XR  MG  "tablet sustained-release 24 hour Take 1 tablet by mouth 2 (Two) Times a Day. 2/2/17  Yes ProviderModesta MD   metoclopramide (REGLAN) 10 MG tablet Take 10 mg by mouth As Needed (nausea).   Yes Provider, MD Modesta   montelukast (SINGULAIR) 10 MG tablet Take 1 tablet by mouth Every Night. 2/23/17  Yes Denisse Marcano MD   omeprazole (priLOSEC) 40 MG capsule Take 1 capsule by mouth 2 (Two) Times a Day. 11/11/19  Yes Genaro Franco PA-C   Plecanatide (Trulance) 3 MG tablet Take 1 tablet by mouth Daily. 8/24/20  Yes Kendrick Ruiz MD   potassium chloride (K-DUR,KLOR-CON) 10 MEQ CR tablet Take 30 mEq by mouth 2 (Two) Times a Day.   Yes Provider, MD Modesta   pravastatin (PRAVACHOL) 20 MG tablet Take 20 mg by mouth Every Night.   Yes Provider, MD Modesta   sennosides-docusate (senna-docusate sodium) 8.6-50 MG per tablet Take 2 tablets by mouth Every Night. 9/1/20  Yes Kendrick Ruiz MD       Allergies:  Doxycycline and Penicillins    ROS:    Review of Systems   Constitutional: Negative for activity change, appetite change, chills, diaphoresis, fatigue, fever and unexpected weight change.   HENT: Negative for sore throat and trouble swallowing.    Respiratory: Negative for shortness of breath.    Gastrointestinal: Positive for abdominal pain. Negative for abdominal distention, anal bleeding, blood in stool, constipation, diarrhea, nausea, rectal pain and vomiting.   Endocrine: Negative for polydipsia, polyphagia and polyuria.   Genitourinary: Negative for difficulty urinating.   Musculoskeletal: Negative for arthralgias.   Skin: Negative for pallor.   Allergic/Immunologic: Negative for food allergies.   Neurological: Negative for weakness and light-headedness.   Psychiatric/Behavioral: Negative for behavioral problems.     Objective     Blood pressure 123/81, pulse 110, height 160 cm (63\"), weight 75.8 kg (167 lb 3.2 oz), SpO2 98 %, not currently breastfeeding.    Physical Exam  Constitutional:  "      General: She is not in acute distress.     Appearance: She is well-developed. She is not diaphoretic.   HENT:      Head: Normocephalic and atraumatic.   Cardiovascular:      Rate and Rhythm: Normal rate and regular rhythm.      Heart sounds: Normal heart sounds. No murmur. No friction rub. No gallop.    Pulmonary:      Effort: No respiratory distress.      Breath sounds: Normal breath sounds. No wheezing or rales.   Chest:      Chest wall: No tenderness.   Abdominal:      General: Bowel sounds are normal. There is no distension.      Palpations: Abdomen is soft. There is no mass.      Tenderness: There is no abdominal tenderness. There is no guarding or rebound.      Hernia: No hernia is present.   Musculoskeletal: Normal range of motion.   Skin:     General: Skin is warm and dry.      Coloration: Skin is not pale.      Findings: No erythema or rash.   Neurological:      Mental Status: She is alert and oriented to person, place, and time.   Psychiatric:         Behavior: Behavior normal.         Thought Content: Thought content normal.         Judgment: Judgment normal.          Assessment/Plan   Marguerite was seen today for follow-up.    Diagnoses and all orders for this visit:    Epigastric pain  -     Ambulatory Referral to Cardiothoracic Surgery        * Surgery not found *     Diagnosis Plan   1. Epigastric pain  Ambulatory Referral to Cardiothoracic Surgery       Anticipated Surgical Procedure:  Orders Placed This Encounter   Procedures   • Ambulatory Referral to Cardiothoracic Surgery     Referral Priority:   Routine     Referral Type:   Consultation     Referral Reason:   Specialty Services Required     Referred to Provider:   Rajendra Oleary MD     Requested Specialty:   Cardiothoracic Surgery     Number of Visits Requested:   1       The risks, benefits, and alternatives of this procedure have been discussed with the patient or the responsible party- the patient understands and agrees to  proceed.

## 2020-10-12 DIAGNOSIS — K55.1 MESENTERIC ARTERY STENOSIS (HCC): Primary | ICD-10-CM

## 2020-10-22 DIAGNOSIS — Z79.01 ON LONG TERM CLOPIDOGREL THERAPY: Primary | ICD-10-CM

## 2020-10-26 ENCOUNTER — TELEPHONE (OUTPATIENT)
Dept: GENERAL RADIOLOGY | Facility: HOSPITAL | Age: 60
End: 2020-10-26

## 2020-10-26 NOTE — TELEPHONE ENCOUNTER
PT: LORENZA CASTANEDA : 1960, DID NOT SHOW UP FOR HER CT ANGIOGRAM ABDOMEN PELVIS APPOINTMENT ON 10/26/20 AT 9:30AM

## 2020-11-02 ENCOUNTER — OFFICE VISIT (OUTPATIENT)
Dept: CARDIAC SURGERY | Facility: CLINIC | Age: 60
End: 2020-11-02

## 2020-11-02 VITALS
BODY MASS INDEX: 30.76 KG/M2 | OXYGEN SATURATION: 99 % | SYSTOLIC BLOOD PRESSURE: 161 MMHG | DIASTOLIC BLOOD PRESSURE: 92 MMHG | WEIGHT: 173.6 LBS | HEIGHT: 63 IN | HEART RATE: 105 BPM

## 2020-11-02 DIAGNOSIS — K75.81 NASH (NONALCOHOLIC STEATOHEPATITIS): ICD-10-CM

## 2020-11-02 DIAGNOSIS — E11.65 TYPE 2 DIABETES MELLITUS WITH HYPERGLYCEMIA, WITH LONG-TERM CURRENT USE OF INSULIN (HCC): ICD-10-CM

## 2020-11-02 DIAGNOSIS — J44.9 COPD WITH ASTHMA (HCC): Chronic | ICD-10-CM

## 2020-11-02 DIAGNOSIS — Z79.4 TYPE 2 DIABETES MELLITUS WITH HYPERGLYCEMIA, WITH LONG-TERM CURRENT USE OF INSULIN (HCC): ICD-10-CM

## 2020-11-02 DIAGNOSIS — E66.09 CLASS 1 OBESITY DUE TO EXCESS CALORIES WITH SERIOUS COMORBIDITY AND BODY MASS INDEX (BMI) OF 30.0 TO 30.9 IN ADULT: ICD-10-CM

## 2020-11-02 DIAGNOSIS — I10 BENIGN ESSENTIAL HTN: ICD-10-CM

## 2020-11-02 DIAGNOSIS — F17.218 NICOTINE DEPENDENCE, CIGARETTES, WITH OTHER NICOTINE-INDUCED DISORDERS: ICD-10-CM

## 2020-11-02 DIAGNOSIS — R10.13 EPIGASTRIC PAIN: Primary | ICD-10-CM

## 2020-11-02 PROCEDURE — 99204 OFFICE O/P NEW MOD 45 MIN: CPT | Performed by: THORACIC SURGERY (CARDIOTHORACIC VASCULAR SURGERY)

## 2020-11-02 RX ORDER — EMPAGLIFLOZIN 25 MG/1
25 TABLET, FILM COATED ORAL DAILY
COMMUNITY

## 2020-11-12 ENCOUNTER — OFFICE VISIT (OUTPATIENT)
Dept: GASTROENTEROLOGY | Facility: CLINIC | Age: 60
End: 2020-11-12

## 2020-11-12 VITALS
HEART RATE: 107 BPM | DIASTOLIC BLOOD PRESSURE: 74 MMHG | WEIGHT: 170.8 LBS | SYSTOLIC BLOOD PRESSURE: 122 MMHG | HEIGHT: 63 IN | BODY MASS INDEX: 30.26 KG/M2

## 2020-11-12 DIAGNOSIS — R10.13 EPIGASTRIC PAIN: Primary | ICD-10-CM

## 2020-11-12 PROCEDURE — 99214 OFFICE O/P EST MOD 30 MIN: CPT | Performed by: INTERNAL MEDICINE

## 2020-11-12 RX ORDER — DEXTROSE AND SODIUM CHLORIDE 5; .45 G/100ML; G/100ML
30 INJECTION, SOLUTION INTRAVENOUS CONTINUOUS PRN
Status: CANCELLED | OUTPATIENT
Start: 2021-01-11

## 2020-11-12 NOTE — PROGRESS NOTES
[facility]      Chief Complaint:   Chief Complaint   Patient presents with   • Abdominal Pain       Subjective     HPI:   Patient with continued abdominal pain.  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  Patient continues to have abdominal pain stating she is now having diarrhea while taking the Trulance.  Discussed with patient that we can switch her back to the Senokot if it works better.  Patient states she has a hiatal hernia which is worsening at this time.    Plan; we will schedule patient for EGD to evaluate the hiatal hernia.  Patient follow-up following his procedure    Past Medical History:   Past Medical History:   Diagnosis Date   • Alcoholic fatty liver    • Alkaline phosphatase raised    • Anxiety    • Asthma     IgE-MEDIATED ALLERGIC ASTHMA   • Backache     CHRONIC   • CHF (congestive heart failure) (CMS/HCC)    • Chronic hepatitis C (CMS/HCC)     2b. Fibrosure .05/F0, necroinflam .14/A0. Repeat .05/F0, .13/A0      • Chronic neck pain    • Constipation    • COPD (chronic obstructive pulmonary disease) (CMS/HCC)    • Diabetes (CMS/HCC)    • Diarrhea    • Elevated levels of transaminase & lactic acid dehydrogenase    • Emphysema, unspecified (CMS/HCC)    • Generalized abdominal pain    • GERD (gastroesophageal reflux disease)     WITH ESOPHAGITIS   • Hepatitis    • High risk sexual behavior    • Hypertension    • Hypokalemia    • Irritable bowel syndrome (IBS)    • Multiple joint pain    • Need for vaccination    • Numbness and tingling in left arm    • On long term drug therapy    • Shoulder pain    • Tobacco dependence syndrome        Past Surgical History:  Past Surgical History:   Procedure Laterality Date   • ANTERIOR CERVICAL DISCECTOMY W/ FUSION N/A 5/13/2019    Procedure: EXPLORATION OF FUSION C5-6, ANTERIOR CERVICAL DISCECTOMY FUSION C3-5 WITH INSTRUMENTATION C3-6;  Surgeon: YINKA Sanders  MD Clark;  Location: [facility] OR;  Service: Orthopedic Spine   • APPENDECTOMY     • CERVICAL FUSION  1999    C5-6   • CHOLECYSTECTOMY     • COLONOSCOPY  02/08/2016   • COLONOSCOPY N/A 8/16/2019    Procedure: COLONOSCOPY;  Surgeon: Kendrick Ruiz MD;  Location: [facility] ENDOSCOPY;  Service: Gastroenterology   • COLONOSCOPY W/ POLYPECTOMY  02/08/2016    External and internal hemorrhoids.The examination was otherwise normal.Fluid aspiration performed.Several biopsies obtained in the entire colon.   • ENDOSCOPY  02/08/2016    Mildly severe esophagitis.Gastritis.Normal examined duodenum.Several biopsies obtained in the lower third of the esophagus.Several biopsies obtained in the gastric antrum.Several biopsies obtained in the first part of the duodenum.   • ENDOSCOPY  08/13/2012    Esophagitis seen. Biopsy taken. Gastritis in stomach. Biopsy taken. Normal duodenum. Biopsy taken.   • ENDOSCOPY N/A 8/16/2019    Procedure: ESOPHAGOGASTRODUODENOSCOPY;  Surgeon: Kendrick Ruiz MD;  Location: [facility] ENDOSCOPY;  Service: Gastroenterology   • ENDOSCOPY AND COLONOSCOPY  08/13/2012    Internal & external hemorrhoids found. Scope could not pass into the TI.   • FINGER SURGERY Left 04/2019    thumb - piece of metal removed   • HARDWARE REMOVAL N/A 5/13/2019    Procedure: REMOVAL OF INSTRUMENTATION;  Surgeon: YINKA Sanders MD;  Location: [facility];  Service: Orthopedic Spine   • HEMORRHOIDECTOMY     • HERNIA REPAIR      pt states no hernia was found so no procedure was done so they closed her back up   • INJECTION OF MEDICATION  08/01/2013    METHYLPREDNISONE, X2   • INJECTION OF MEDICATION  11/03/2011    KENALOG   • INJECTION OF MEDICATION  02/24/2011    ROCEPHIN   • KNEE SURGERY Right    • TONSILLECTOMY AND ADENOIDECTOMY     • TOTAL ABDOMINAL HYSTERECTOMY WITH SALPINGO OOPHORECTOMY     • UPPER GASTROINTESTINAL ENDOSCOPY  02/08/2016   • UPPER GASTROINTESTINAL ENDOSCOPY  08/16/2019       Family History:  Family History    Problem Relation Age of Onset   • Coronary artery disease Mother    • Diabetes Mother    • Heart failure Mother    • Cancer Father    • Diabetes Father    • Heart failure Father    • Thyroid disease Father    • Breast cancer Sister    • Cancer Other         COLORECTAL   • Endometrial cancer Other    • Ovarian cancer Other    • Diabetes Brother        Social History:   reports that she has been smoking cigarettes. She has a 11.25 pack-year smoking history. She has never used smokeless tobacco. She reports that she does not drink alcohol or use drugs.    Medications:   Prior to Admission medications    Medication Sig Start Date End Date Taking? Authorizing Provider   amitriptyline (ELAVIL) 100 MG tablet Take 200 mg by mouth Every Night.   Yes Modesta Chaudhary MD   amLODIPine (NORVASC) 5 MG tablet Take 5 mg by mouth Daily.   Yes Modesta Chaudhary MD   cetirizine (zyrTEC) 10 MG tablet Take 10 mg by mouth Daily.   Yes Modesta Chaudhary MD   Empagliflozin (Jardiance) 25 MG tablet Take 25 mg by mouth Daily.   Yes Modesta Chaudhary MD   fluconazole (DIFLUCAN) 100 MG tablet Take 100 mg by mouth 1 (One) Time.   Yes Modesta Chaudhary MD   fluticasone (FLONASE) 50 MCG/ACT nasal spray 1 spray into each nostril Daily. 2/13/17  Yes Modesta Chaudhary MD   Fluticasone Furoate-Vilanterol (BREO ELLIPTA) 100-25 MCG/INH inhaler Inhale 1 puff Daily. 3/27/19  Yes Denisse Marcano MD   furosemide (LASIX) 20 MG tablet Take 40 mg by mouth 2 (two) times a day.   Yes Modesta Chaudhary MD   gemfibrozil (LOPID) 600 MG tablet Take 600 mg by mouth 2 (Two) Times a Day Before Meals.   Yes Modesta Chaudhary MD   glyBURIDE (DIAbeta) 2.5 MG tablet Take 2.5 mg by mouth 2 (Two) Times a Day With Meals.   Yes Modesta Chaudhary MD   HYDROcodone-acetaminophen (NORCO) 7.5-325 MG per tablet Take 1 tablet by mouth 3 (Three) Times a Day.   Yes Modesta Chaudhary MD   Insulin Glargine (BASAGLAR KWIKPEN) 100 UNIT/ML  injection pen Inject 38 Units under the skin into the appropriate area as directed. 38u am  48u pm   Yes Modesta Chaudhary MD   JANUMET XR  MG tablet sustained-release 24 hour Take 1 tablet by mouth 2 (Two) Times a Day. 2/2/17  Yes Modesta Chaudhary MD   montelukast (SINGULAIR) 10 MG tablet Take 1 tablet by mouth Every Night. 2/23/17  Yes Denisse Marcano MD   omeprazole (priLOSEC) 40 MG capsule Take 1 capsule by mouth 2 (Two) Times a Day. 11/11/19  Yes Genaro Franco PA-C   Plecanatide (Trulance) 3 MG tablet Take 1 tablet by mouth Daily. 8/24/20  Yes Kendrick Ruiz MD   potassium chloride (K-DUR,KLOR-CON) 10 MEQ CR tablet Take 30 mEq by mouth 2 (Two) Times a Day.   Yes Modesta Chaudhary MD   pravastatin (PRAVACHOL) 20 MG tablet Take 20 mg by mouth Every Night.   Yes Modesta Chaudhary MD   sennosides-docusate (senna-docusate sodium) 8.6-50 MG per tablet Take 2 tablets by mouth Every Night. 9/1/20  Yes Kendrick Ruiz MD   metoclopramide (REGLAN) 10 MG tablet Take 10 mg by mouth As Needed (nausea).  11/12/20 Yes Modesta Chaudhary MD       Allergies:  Doxycycline and Penicillins    ROS:    Review of Systems   Constitutional: Negative for activity change, appetite change, chills, diaphoresis, fatigue, fever and unexpected weight change.   HENT: Negative for sore throat and trouble swallowing.    Respiratory: Negative for shortness of breath.    Gastrointestinal: Positive for abdominal pain and nausea. Negative for abdominal distention, anal bleeding, blood in stool, constipation, diarrhea, rectal pain and vomiting.   Endocrine: Negative for polydipsia, polyphagia and polyuria.   Genitourinary: Negative for difficulty urinating.   Musculoskeletal: Negative for arthralgias.   Skin: Negative for pallor.   Allergic/Immunologic: Negative for food allergies.   Neurological: Negative for weakness and light-headedness.   Psychiatric/Behavioral: Negative for behavioral problems.     Objective  "    Blood pressure 122/74, pulse 107, height 160 cm (63\"), weight 77.5 kg (170 lb 12.8 oz), not currently breastfeeding.    Physical Exam  Constitutional:       General: She is not in acute distress.     Appearance: She is well-developed. She is not diaphoretic.   HENT:      Head: Normocephalic and atraumatic.   Cardiovascular:      Rate and Rhythm: Normal rate and regular rhythm.      Heart sounds: Normal heart sounds. No murmur. No friction rub. No gallop.    Pulmonary:      Effort: No respiratory distress.      Breath sounds: Normal breath sounds. No wheezing or rales.   Chest:      Chest wall: No tenderness.   Abdominal:      General: Bowel sounds are normal. There is no distension.      Palpations: Abdomen is soft. There is no mass.      Tenderness: There is no abdominal tenderness. There is no guarding or rebound.      Hernia: No hernia is present.   Musculoskeletal: Normal range of motion.   Skin:     General: Skin is warm and dry.      Coloration: Skin is not pale.      Findings: No erythema or rash.   Neurological:      Mental Status: She is alert and oriented to person, place, and time.   Psychiatric:         Behavior: Behavior normal.         Thought Content: Thought content normal.         Judgment: Judgment normal.          Assessment/Plan   Diagnoses and all orders for this visit:    1. Epigastric pain (Primary)  -     Case Request; Standing  -     dextrose 5 % and sodium chloride 0.45 % infusion  -     Case Request    Other orders  -     Follow Anesthesia Guidelines / Standing Orders; Future  -     Obtain Informed Consent; Future  -     Implement Anesthesia Orders Day of Procedure; Standing  -     Obtain Informed Consent; Standing  -     POC Glucose Once; Standing  -     Insert Peripheral IV; Standing        ESOPHAGOGASTRODUODENOSCOPY (N/A)     Diagnosis Plan   1. Epigastric pain  Case Request    dextrose 5 % and sodium chloride 0.45 % infusion    Case Request       Anticipated Surgical " Procedure:  Orders Placed This Encounter   Procedures   • Follow Anesthesia Guidelines / Standing Orders     Standing Status:   Future   • Obtain Informed Consent     Standing Status:   Future     Order Specific Question:   Informed Consent Given For     Answer:   ESOPHAGOGASTRODUODENOSCOPY       The risks, benefits, and alternatives of this procedure have been discussed with the patient or the responsible party- the patient understands and agrees to proceed.

## 2020-11-12 NOTE — PATIENT INSTRUCTIONS
"BMI for Adults  What is BMI?  Body mass index (BMI) is a number that is calculated from a person's weight and height. BMI can help estimate how much of a person's weight is composed of fat. BMI does not measure body fat directly. Rather, it is an alternative to procedures that directly measure body fat, which can be difficult and expensive.  BMI can help identify people who may be at higher risk for certain medical problems.  What are BMI measurements used for?  BMI is used as a screening tool to identify possible weight problems. It helps determine whether a person is obese, overweight, a healthy weight, or underweight.  BMI is useful for:  · Identifying a weight problem that may be related to a medical condition or may increase the risk for medical problems.  · Promoting changes, such as changes in diet and exercise, to help reach a healthy weight. BMI screening can be repeated to see if these changes are working.  How is BMI calculated?  BMI involves measuring your weight in relation to your height. Both height and weight are measured, and the BMI is calculated from those numbers. This can be done either in English ([place] or metric measurements. Note that charts and online BMI calculators are available to help you find your BMI quickly and easily without having to do these calculations yourself.  To calculate your BMI in English ([place] measurements:    1. Measure your weight in pounds (lb).  2. Multiply the number of pounds by 703.  ? For example, for a person who weighs 180 lb, multiply that number by 703, which equals 126,540.  3. Measure your height in inches. Then multiply that number by itself to get a measurement called \"inches squared.\"  ? For example, for a person who is 70 inches tall, the \"inches squared\" measurement is 70 inches x 70 inches, which equals 4,900 inches squared.  4. Divide the total from step 2 (number of lb x 703) by the total from step 3 (inches squared): 126,540 ÷ 4,900 = 25.8. This is " "your BMI.  To calculate your BMI in metric measurements:  1. Measure your weight in kilograms (kg).  2. Measure your height in meters (m). Then multiply that number by itself to get a measurement called \"meters squared.\"  ? For example, for a person who is 1.75 m tall, the \"meters squared\" measurement is 1.75 m x 1.75 m, which is equal to 3.1 meters squared.  3. Divide the number of kilograms (your weight) by the meters squared number. In this example: 70 ÷ 3.1 = 22.6. This is your BMI.  What do the results mean?  BMI charts are used to identify whether you are underweight, normal weight, overweight, or obese. The following guidelines will be used:  · Underweight: BMI less than 18.5.  · Normal weight: BMI between 18.5 and 24.9.  · Overweight: BMI between 25 and 29.9.  · Obese: BMI of 30 or above.  Keep these notes in mind:  · Weight includes both fat and muscle, so someone with a muscular build, such as an athlete, may have a BMI that is higher than 24.9. In cases like these, BMI is not an accurate measure of body fat.  · To determine if excess body fat is the cause of a BMI of 25 or higher, further assessments may need to be done by a health care provider.  · BMI is usually interpreted in the same way for men and women.  Where to find more information  For more information about BMI, including tools to quickly calculate your BMI, go to these websites:  · Centers for Disease Control and Prevention: www.cdc.gov  · American Heart Association: www.heart.org  · National Heart, Lung, and [employer] [place]: www.nhlbi.nih.gov  Summary  · Body mass index (BMI) is a number that is calculated from a person's weight and height.  · BMI may help estimate how much of a person's weight is composed of fat. BMI can help identify those who may be at higher risk for certain medical problems.  · BMI can be measured using English measurements or metric measurements.  · BMI charts are used to identify whether you are underweight, normal " weight, overweight, or obese.  This information is not intended to replace advice given to you by your health care provider. Make sure you discuss any questions you have with your health care provider.  Document Released: 08/29/2005 Document Revised: 09/09/2020 Document Reviewed: 07/17/2020  [employer]vier Patient Education © 2020 [employer]

## 2020-11-26 PROBLEM — E66.09 CLASS 1 OBESITY DUE TO EXCESS CALORIES WITH SERIOUS COMORBIDITY AND BODY MASS INDEX (BMI) OF 30.0 TO 30.9 IN ADULT: Status: ACTIVE | Noted: 2020-11-26

## 2020-11-26 PROBLEM — F17.218 NICOTINE DEPENDENCE, CIGARETTES, WITH OTHER NICOTINE-INDUCED DISORDERS: Status: ACTIVE | Noted: 2020-11-26

## 2020-11-26 NOTE — PROGRESS NOTES
11/26/2020    Marguerite Rob  1960    Chief Complaint:    Chief Complaint   Patient presents with   • Abdominal Pain       HPI:      PCP:  Katiuska Macias APRN  GI:  Dr Pretty    60 y.o. female with HTN(stable, increased risk stroke, rupture), Hyperlipidemia(stable, increased risk cardiovascular events), Diabetes Mellitus(stable, increased risk cardiovascular events), Obesity(uncontrolled, increased risk cardiovascular events), Smoker(uncontrolled, increased risk cardiovascular events) and COPD(stable, increased risk pulmonary complications) , epigastric pain(new).  smokes 1/4 PPD.  Moderate occasional lower abdominal pain, lasts 2min, x1 year.  No relation to eating.. appetite poor.  No TIA stroke amaurosis.  No MI claudication. No other associated signs, symptoms or modifying factors.    7/2020 CT Abdomen Pelvis ():  Mild aortic atherosclerosis.  Celiac, SMA, renals, ISABEL normal.  9/2020 US Abdominal Vascular ([facility]):  Celiac 258/71cm/s, /28cm/s.    5/2019 ECG:  , QTc 464    The following portions of the patient's history were reviewed and updated as appropriate: allergies, current medications, past family history, past medical history, past social history, past surgical history and problem list.  Recent images independently reviewed.  Available laboratory values reviewed.    PMH:  Past Medical History:   Diagnosis Date   • Alcoholic fatty liver    • Alkaline phosphatase raised    • Anxiety    • Asthma     IgE-MEDIATED ALLERGIC ASTHMA   • Backache     CHRONIC   • CHF (congestive heart failure) (CMS/HCC)    • Chronic hepatitis C (CMS/HCC)     2b. Fibrosure .05/F0, necroinflam .14/A0. Repeat .05/F0, .13/A0      • Chronic neck pain    • Constipation    • COPD (chronic obstructive pulmonary disease) (CMS/HCC)    • Diabetes (CMS/HCC)    • Diarrhea    • Elevated levels of transaminase & lactic acid dehydrogenase    • Emphysema, unspecified (CMS/HCC)    • Generalized abdominal pain    • GERD  (gastroesophageal reflux disease)     WITH ESOPHAGITIS   • Hepatitis    • High risk sexual behavior    • Hypertension    • Hypokalemia    • Irritable bowel syndrome (IBS)    • Multiple joint pain    • Need for vaccination    • Numbness and tingling in left arm    • On long term drug therapy    • Shoulder pain    • Tobacco dependence syndrome      Past Surgical History:   Procedure Laterality Date   • ANTERIOR CERVICAL DISCECTOMY W/ FUSION N/A 5/13/2019    Procedure: EXPLORATION OF FUSION C5-6, ANTERIOR CERVICAL DISCECTOMY FUSION C3-5 WITH INSTRUMENTATION C3-6;  Surgeon: YINKA Sanders MD;  Location: [facility] OR;  Service: Orthopedic Spine   • APPENDECTOMY     • CERVICAL FUSION  1999    C5-6   • CHOLECYSTECTOMY     • COLONOSCOPY  02/08/2016   • COLONOSCOPY N/A 8/16/2019    Procedure: COLONOSCOPY;  Surgeon: Kendrick Ruiz MD;  Location: [facility] ENDOSCOPY;  Service: Gastroenterology   • COLONOSCOPY W/ POLYPECTOMY  02/08/2016    External and internal hemorrhoids.The examination was otherwise normal.Fluid aspiration performed.Several biopsies obtained in the entire colon.   • ENDOSCOPY  02/08/2016    Mildly severe esophagitis.Gastritis.Normal examined duodenum.Several biopsies obtained in the lower third of the esophagus.Several biopsies obtained in the gastric antrum.Several biopsies obtained in the first part of the duodenum.   • ENDOSCOPY  08/13/2012    Esophagitis seen. Biopsy taken. Gastritis in stomach. Biopsy taken. Normal duodenum. Biopsy taken.   • ENDOSCOPY N/A 8/16/2019    Procedure: ESOPHAGOGASTRODUODENOSCOPY;  Surgeon: Kendrick Ruiz MD;  Location: [facility] ENDOSCOPY;  Service: Gastroenterology   • ENDOSCOPY AND COLONOSCOPY  08/13/2012    Internal & external hemorrhoids found. Scope could not pass into the TI.   • FINGER SURGERY Left 04/2019    thumb - piece of metal removed   • HARDWARE REMOVAL N/A 5/13/2019    Procedure: REMOVAL OF INSTRUMENTATION;  Surgeon: YINKA Sanders MD;  Location: [facility]  OR;  Service: Orthopedic Spine   • HEMORRHOIDECTOMY     • HERNIA REPAIR      pt states no hernia was found so no procedure was done so they closed her back up   • INJECTION OF MEDICATION  08/01/2013    METHYLPREDNISONE, X2   • INJECTION OF MEDICATION  11/03/2011    KENALOG   • INJECTION OF MEDICATION  02/24/2011    ROCEPHIN   • KNEE SURGERY Right    • TONSILLECTOMY AND ADENOIDECTOMY     • TOTAL ABDOMINAL HYSTERECTOMY WITH SALPINGO OOPHORECTOMY     • UPPER GASTROINTESTINAL ENDOSCOPY  02/08/2016   • UPPER GASTROINTESTINAL ENDOSCOPY  08/16/2019     Family History   Problem Relation Age of Onset   • Coronary artery disease Mother    • Diabetes Mother    • Heart failure Mother    • Cancer Father    • Diabetes Father    • Heart failure Father    • Thyroid disease Father    • Breast cancer Sister    • Cancer Other         COLORECTAL   • Endometrial cancer Other    • Ovarian cancer Other    • Diabetes Brother      Social History     Tobacco Use   • Smoking status: Current Every Day Smoker     Packs/day: 0.25     Years: 45.00     Pack years: 11.25     Types: Cigarettes   • Smokeless tobacco: Never Used   Substance Use Topics   • Alcohol use: No   • Drug use: No       ALLERGIES:  Allergies   Allergen Reactions   • Doxycycline Itching   • Penicillins Rash         MEDICATIONS:    Current Outpatient Medications:   •  amitriptyline (ELAVIL) 100 MG tablet, Take 200 mg by mouth Every Night., Disp: , Rfl:   •  amLODIPine (NORVASC) 5 MG tablet, Take 5 mg by mouth Daily., Disp: , Rfl:   •  cetirizine (zyrTEC) 10 MG tablet, Take 10 mg by mouth Daily., Disp: , Rfl:   •  Empagliflozin (Jardiance) 25 MG tablet, Take 25 mg by mouth Daily., Disp: , Rfl:   •  fluconazole (DIFLUCAN) 100 MG tablet, Take 100 mg by mouth 1 (One) Time., Disp: , Rfl:   •  fluticasone (FLONASE) 50 MCG/ACT nasal spray, 1 spray into each nostril Daily., Disp: , Rfl:   •  Fluticasone Furoate-Vilanterol (BREO ELLIPTA) 100-25 MCG/INH inhaler, Inhale 1 puff Daily., Disp:  1 each, Rfl: 11  •  furosemide (LASIX) 20 MG tablet, Take 40 mg by mouth 2 (two) times a day., Disp: , Rfl:   •  gemfibrozil (LOPID) 600 MG tablet, Take 600 mg by mouth 2 (Two) Times a Day Before Meals., Disp: , Rfl:   •  glyBURIDE (DIAbeta) 2.5 MG tablet, Take 2.5 mg by mouth 2 (Two) Times a Day With Meals., Disp: , Rfl:   •  HYDROcodone-acetaminophen (NORCO) 7.5-325 MG per tablet, Take 1 tablet by mouth 3 (Three) Times a Day., Disp: , Rfl:   •  Insulin Glargine (BASAGLAR KWIKPEN) 100 UNIT/ML injection pen, Inject 38 Units under the skin into the appropriate area as directed. 38u am 48u pm, Disp: , Rfl:   •  JANUMET XR  MG tablet sustained-release 24 hour, Take 1 tablet by mouth 2 (Two) Times a Day., Disp: , Rfl:   •  montelukast (SINGULAIR) 10 MG tablet, Take 1 tablet by mouth Every Night., Disp: 30 tablet, Rfl: 11  •  omeprazole (priLOSEC) 40 MG capsule, Take 1 capsule by mouth 2 (Two) Times a Day., Disp: 60 capsule, Rfl: 3  •  Plecanatide (Trulance) 3 MG tablet, Take 1 tablet by mouth Daily., Disp: 90 tablet, Rfl: 0  •  potassium chloride (K-DUR,KLOR-CON) 10 MEQ CR tablet, Take 30 mEq by mouth 2 (Two) Times a Day., Disp: , Rfl:   •  pravastatin (PRAVACHOL) 20 MG tablet, Take 20 mg by mouth Every Night., Disp: , Rfl:   •  sennosides-docusate (senna-docusate sodium) 8.6-50 MG per tablet, Take 2 tablets by mouth Every Night., Disp: 60 tablet, Rfl: 5    Review of Systems   Review of Systems   Constitution: Positive for decreased appetite and malaise/fatigue. Negative for night sweats and weight loss.   HENT: Negative for hearing loss, hoarse voice and stridor.    Eyes: Negative for vision loss in left eye, vision loss in right eye and visual disturbance.   Cardiovascular: Negative for chest pain, leg swelling and palpitations.   Respiratory: Negative for cough, hemoptysis and shortness of breath.    Hematologic/Lymphatic: Negative for adenopathy and bleeding problem. Does not bruise/bleed easily.   Skin:  "Negative for color change, poor wound healing and rash.   Musculoskeletal: Negative for arthritis, back pain, muscle weakness and neck pain.   Gastrointestinal: Positive for abdominal pain. Negative for dysphagia and heartburn.   Neurological: Negative for dizziness, numbness and seizures.   Psychiatric/Behavioral: Negative for altered mental status, depression and memory loss. The patient is not nervous/anxious.        Physical Exam   Vitals:    11/02/20 1347   BP: 161/92   BP Location: Right arm   Patient Position: Sitting   Pulse: 105   SpO2: 99%   Weight: 78.7 kg (173 lb 9.6 oz)   Height: 160 cm (63\")     Physical Exam  Constitutional:       General: She is not in acute distress.     Appearance: She is not ill-appearing.   HENT:      Head: Atraumatic.      Right Ear: Hearing normal.      Left Ear: Hearing normal.      Nose: No nasal deformity.      Mouth/Throat:      Dentition: Normal dentition. Does not have dentures.   Eyes:      General: Lids are normal.      Conjunctiva/sclera: Conjunctivae normal.      Pupils:      Right eye: Pupil is round and reactive.      Left eye: Pupil is round and reactive.   Neck:      Thyroid: No thyroid mass or thyromegaly.      Vascular: No carotid bruit or JVD.      Trachea: No tracheal deviation.   Cardiovascular:      Rate and Rhythm: Normal rate and regular rhythm.      Pulses:           Carotid pulses are 2+ on the right side and 2+ on the left side.       Radial pulses are 2+ on the right side and 2+ on the left side.        Posterior tibial pulses are 2+ on the right side and 2+ on the left side.      Heart sounds: No murmur.   Pulmonary:      Effort: Pulmonary effort is normal.      Breath sounds: Normal breath sounds.   Abdominal:      General: There is no distension.      Palpations: Abdomen is soft. There is no hepatomegaly, splenomegaly or mass.      Tenderness: There is no abdominal tenderness.   Musculoskeletal:         General: No deformity.      Comments: Gait " normal.    Lymphadenopathy:      Cervical: No cervical adenopathy.      Upper Body:      Right upper body: No supraclavicular adenopathy.      Left upper body: No supraclavicular adenopathy.   Skin:     General: Skin is warm and dry.      Coloration: Skin is not pale.      Findings: No erythema.      Comments: No venous staining   Neurological:      Mental Status: She is alert and oriented to person, place, and time.   Psychiatric:         Speech: Speech normal.         Behavior: Behavior is cooperative.         Thought Content: Thought content normal.         Judgment: Judgment normal.         BUN   Date Value Ref Range Status   05/14/2019 13 5 - 21 mg/dL Final   03/08/2018 12 8 - 23 MG/DL Final     Creatinine   Date Value Ref Range Status   07/22/2020 0.72 0.60 - 1.00 mg/dl Final     eGFR Non  Amer   Date Value Ref Range Status   05/14/2019 102 >60 mL/min/1.73 Final       ASSESSMENT:  Diagnoses and all orders for this visit:    1. Epigastric pain (Primary)    2. Benign essential HTN    3. COPD with asthma (CMS/[facility])    4. TORRES (nonalcoholic steatohepatitis)    5. Type 2 diabetes mellitus with hyperglycemia, with long-term current use of insulin (CMS/[facility])    6. Class 1 obesity due to excess calories with serious comorbidity and body mass index (BMI) of 30.0 to 30.9 in adult    7. Nicotine dependence, cigarettes, with other nicotine-induced disorders      PLAN:  Detailed discussion with Marguerite Rob regarding situation and options.  Abdominal pain, no relation to eating.  CT imaging shows no significant obstruction mesenteric arteries.  Multiple risk factors with severe comorbidities.  No intervention indicated at this time.   Risks, benefits discussed.  Understands and wishes to proceed with plan.    Return as needed.  Obesity Class  1. Increased risk cardiovascular events, sleep and breathing disorders, joint issues, type 2 diabetes mellitus. Options for weight management, heart healthy diet, exercise  programs, and associated health risks of obesity discussed.  Smoking cessation advised and assistance options offered including behavioral counseling ([facility] Smoking Cessation Classes), Nicotine replacement therapy (patches or gum), pharmacologic therapy (Chantix, Wellbutrin). patient understands that continued use of tobacco products increases risk of heart disease, stroke, cancer; counseling for 3-5min.    Recommended regular physical activity, progressive walking program.  Continue current medications as directed.  Will Obtain relevant old records.    Thank you for the opportunity to participate in this patient's care.    Copy to primary care provider.

## 2021-01-08 ENCOUNTER — LAB (OUTPATIENT)
Dept: LAB | Facility: HOSPITAL | Age: 61
End: 2021-01-08

## 2021-01-08 DIAGNOSIS — Z01.818 PREOP TESTING: Primary | ICD-10-CM

## 2021-01-08 PROCEDURE — C9803 HOPD COVID-19 SPEC COLLECT: HCPCS

## 2021-01-08 PROCEDURE — U0004 COV-19 TEST NON-CDC HGH THRU: HCPCS

## 2021-01-08 RX ORDER — SENNA PLUS 8.6 MG/1
1 TABLET ORAL NIGHTLY
COMMUNITY

## 2021-01-09 LAB — SARS-COV-2 ORF1AB RESP QL NAA+PROBE: NOT DETECTED

## 2021-01-11 ENCOUNTER — ANESTHESIA EVENT (OUTPATIENT)
Dept: GASTROENTEROLOGY | Facility: HOSPITAL | Age: 61
End: 2021-01-11

## 2021-01-11 ENCOUNTER — ANESTHESIA (OUTPATIENT)
Dept: GASTROENTEROLOGY | Facility: HOSPITAL | Age: 61
End: 2021-01-11

## 2021-01-11 ENCOUNTER — HOSPITAL ENCOUNTER (OUTPATIENT)
Facility: HOSPITAL | Age: 61
Setting detail: HOSPITAL OUTPATIENT SURGERY
Discharge: HOME | End: 2021-01-11
Attending: INTERNAL MEDICINE | Admitting: INTERNAL MEDICINE

## 2021-01-11 VITALS
BODY MASS INDEX: 29.95 KG/M2 | OXYGEN SATURATION: 96 % | DIASTOLIC BLOOD PRESSURE: 58 MMHG | WEIGHT: 169 LBS | RESPIRATION RATE: 16 BRPM | SYSTOLIC BLOOD PRESSURE: 114 MMHG | TEMPERATURE: 97.5 F | HEART RATE: 108 BPM | HEIGHT: 63 IN

## 2021-01-11 DIAGNOSIS — R10.13 EPIGASTRIC PAIN: ICD-10-CM

## 2021-01-11 LAB — GLUCOSE BLDC GLUCOMTR-MCNC: 232 MG/DL (ref 70–130)

## 2021-01-11 PROCEDURE — 43239 EGD BIOPSY SINGLE/MULTIPLE: CPT | Performed by: INTERNAL MEDICINE

## 2021-01-11 PROCEDURE — 82962 GLUCOSE BLOOD TEST: CPT

## 2021-01-11 PROCEDURE — 25010000002 PROPOFOL 10 MG/ML EMULSION: Performed by: NURSE ANESTHETIST, CERTIFIED REGISTERED

## 2021-01-11 RX ORDER — SODIUM CHLORIDE 9 MG/ML
50 INJECTION, SOLUTION INTRAVENOUS CONTINUOUS
Status: DISCONTINUED | OUTPATIENT
Start: 2021-01-11 | End: 2021-01-11 | Stop reason: HOSPADM

## 2021-01-11 RX ORDER — PROPOFOL 10 MG/ML
VIAL (ML) INTRAVENOUS AS NEEDED
Status: DISCONTINUED | OUTPATIENT
Start: 2021-01-11 | End: 2021-01-11 | Stop reason: SURG

## 2021-01-11 RX ORDER — LIDOCAINE HYDROCHLORIDE 20 MG/ML
INJECTION, SOLUTION INTRAVENOUS AS NEEDED
Status: DISCONTINUED | OUTPATIENT
Start: 2021-01-11 | End: 2021-01-11 | Stop reason: SURG

## 2021-01-11 RX ADMIN — SODIUM CHLORIDE 50 ML/HR: 9 INJECTION, SOLUTION INTRAVENOUS at 09:08

## 2021-01-11 RX ADMIN — LIDOCAINE HYDROCHLORIDE 100 MG: 20 INJECTION, SOLUTION INTRAVENOUS at 09:34

## 2021-01-11 RX ADMIN — PROPOFOL 90 MG: 10 INJECTION, EMULSION INTRAVENOUS at 09:34

## 2021-01-11 RX ADMIN — PROPOFOL 20 MG: 10 INJECTION, EMULSION INTRAVENOUS at 09:37

## 2021-01-11 NOTE — ANESTHESIA PREPROCEDURE EVALUATION
Anesthesia Evaluation     NPO Solid Status: > 8 hours  NPO Liquid Status: > 2 hours           Airway   Mallampati: II  TM distance: >3 FB  Neck ROM: limited  Possible difficult intubation  Dental    (+) poor dentition    Pulmonary - normal exam    breath sounds clear to auscultation  (+) COPD, asthma,  (-) shortness of breath  Cardiovascular - normal exam    (+) hypertension, CHF ,       Neuro/Psych  GI/Hepatic/Renal/Endo    (+) obesity,  GERD,  hepatitis, liver disease, diabetes mellitus,     Musculoskeletal     (+) neck pain,   Abdominal    Substance History      OB/GYN          Other                        Anesthesia Plan    ASA 3     MAC     intravenous induction     Anesthetic plan, all risks, benefits, and alternatives have been provided, discussed and informed consent has been obtained with: patient.

## 2021-01-11 NOTE — ANESTHESIA POSTPROCEDURE EVALUATION
Patient: Marguerite Rob    Procedure Summary     Date: 01/11/21 Room / Location: [facility] ENDOSCOPY 1 / [facility] ENDOSCOPY    Anesthesia Start: 0931 Anesthesia Stop: 0943    Procedure: ESOPHAGOGASTRODUODENOSCOPY (N/A ) Diagnosis:       Epigastric pain      (Epigastric pain [R10.13])    Surgeon: Kendrick Ruiz MD Provider: Radha Almeida CRNA    Anesthesia Type: MAC ASA Status: 3          Anesthesia Type: MAC    Vitals  No vitals data found for the desired time range.          Post Anesthesia Care and Evaluation    Patient location during evaluation: bedside  Patient participation: complete - patient participated  Level of consciousness: awake  Pain score: 0  Pain management: adequate  Airway patency: patent  Anesthetic complications: No anesthetic complications  PONV Status: none  Cardiovascular status: acceptable  Respiratory status: acceptable  Hydration status: acceptable

## 2021-01-11 NOTE — H&P
[facility]      Chief Complaint:   No chief complaint on file.      Subjective     HPI:   Patient with continued abdominal pain.  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  Patient continues to have abdominal pain stating she is now having diarrhea while taking the Trulance.  Discussed with patient that we can switch her back to the Senokot if it works better.  Patient states she has a hiatal hernia which is worsening at this time.    Plan; we will schedule patient for EGD to evaluate the hiatal hernia.  Patient follow-up following his procedure    Past Medical History:   Past Medical History:   Diagnosis Date   • Alcoholic fatty liver    • Alkaline phosphatase raised    • Anxiety    • Asthma     IgE-MEDIATED ALLERGIC ASTHMA   • Backache     CHRONIC   • CHF (congestive heart failure) (CMS/HCC)    • Chronic hepatitis C (CMS/HCC)     2b. Fibrosure .05/F0, necroinflam .14/A0. Repeat .05/F0, .13/A0      • Chronic neck pain    • Constipation    • COPD (chronic obstructive pulmonary disease) (CMS/HCC)    • Diabetes (CMS/HCC)    • Diarrhea    • Elevated levels of transaminase & lactic acid dehydrogenase    • Emphysema, unspecified (CMS/HCC)    • Generalized abdominal pain    • GERD (gastroesophageal reflux disease)     WITH ESOPHAGITIS   • Hepatitis    • High risk sexual behavior    • Hypertension    • Hypokalemia    • Irritable bowel syndrome (IBS)    • Multiple joint pain    • Need for vaccination    • Numbness and tingling in left arm    • On long term drug therapy    • Shoulder pain    • Tobacco dependence syndrome        Past Surgical History:    Past Surgical History:   Procedure Laterality Date   • ANTERIOR CERVICAL DISCECTOMY W/ FUSION N/A 5/13/2019    Procedure: EXPLORATION OF FUSION C5-6, ANTERIOR CERVICAL DISCECTOMY FUSION C3-5 WITH INSTRUMENTATION C3-6;  Surgeon: YINKA Sanders MD;  Location: [facility]  OR;  Service: Orthopedic Spine   • APPENDECTOMY     • CERVICAL FUSION  1999    C5-6   • CHOLECYSTECTOMY     • COLONOSCOPY  02/08/2016   • COLONOSCOPY N/A 8/16/2019    Procedure: COLONOSCOPY;  Surgeon: Kendrick Ruiz MD;  Location: [facility] ENDOSCOPY;  Service: Gastroenterology   • COLONOSCOPY W/ POLYPECTOMY  02/08/2016    External and internal hemorrhoids.The examination was otherwise normal.Fluid aspiration performed.Several biopsies obtained in the entire colon.   • ENDOSCOPY  02/08/2016    Mildly severe esophagitis.Gastritis.Normal examined duodenum.Several biopsies obtained in the lower third of the esophagus.Several biopsies obtained in the gastric antrum.Several biopsies obtained in the first part of the duodenum.   • ENDOSCOPY  08/13/2012    Esophagitis seen. Biopsy taken. Gastritis in stomach. Biopsy taken. Normal duodenum. Biopsy taken.   • ENDOSCOPY N/A 8/16/2019    Procedure: ESOPHAGOGASTRODUODENOSCOPY;  Surgeon: Kendrick Ruiz MD;  Location: [facility] ENDOSCOPY;  Service: Gastroenterology   • ENDOSCOPY AND COLONOSCOPY  08/13/2012    Internal & external hemorrhoids found. Scope could not pass into the TI.   • FINGER SURGERY Left 04/2019    thumb - piece of metal removed   • HARDWARE REMOVAL N/A 5/13/2019    Procedure: REMOVAL OF INSTRUMENTATION;  Surgeon: YINKA Sanders MD;  Location: [facility] OR;  Service: Orthopedic Spine   • HEMORRHOIDECTOMY     • HERNIA REPAIR      pt states no hernia was found so no procedure was done so they closed her back up   • INJECTION OF MEDICATION  08/01/2013    METHYLPREDNISONE, X2   • INJECTION OF MEDICATION  11/03/2011    KENALOG   • INJECTION OF MEDICATION  02/24/2011    ROCEPHIN   • KNEE SURGERY Right    • TONSILLECTOMY AND ADENOIDECTOMY     • TOTAL ABDOMINAL HYSTERECTOMY WITH SALPINGO OOPHORECTOMY     • UPPER GASTROINTESTINAL ENDOSCOPY  02/08/2016   • UPPER GASTROINTESTINAL ENDOSCOPY  08/16/2019       Family History:  Family History   Problem Relation Age of Onset   •  Coronary artery disease Mother    • Diabetes Mother    • Heart failure Mother    • Cancer Father    • Diabetes Father    • Heart failure Father    • Thyroid disease Father    • Breast cancer Sister    • Cancer Other         COLORECTAL   • Endometrial cancer Other    • Ovarian cancer Other    • Diabetes Brother        Social History:   reports that she has been smoking cigarettes. She has a 22.50 pack-year smoking history. She has never used smokeless tobacco. She reports current drug use. Drug: Marijuana. She reports that she does not drink alcohol.    Medications:   Prior to Admission medications    Medication Sig Start Date End Date Taking? Authorizing Provider   amitriptyline (ELAVIL) 100 MG tablet Take 200 mg by mouth Every Night.   Yes Modesta Chaudhary MD   amLODIPine (NORVASC) 5 MG tablet Take 5 mg by mouth Daily.   Yes Modesta Chaudhary MD   cetirizine (zyrTEC) 10 MG tablet Take 10 mg by mouth Daily.   Yes Modesta Chaudhary MD   Empagliflozin (Jardiance) 25 MG tablet Take 25 mg by mouth Daily.   Yes Modesta Chaudhary MD   fluconazole (DIFLUCAN) 100 MG tablet Take 100 mg by mouth 1 (One) Time.   Yes Modesta Chaudhary MD   fluticasone (FLONASE) 50 MCG/ACT nasal spray 1 spray into each nostril Daily. 2/13/17  Yes Modesta Chaudhary MD   Fluticasone Furoate-Vilanterol (BREO ELLIPTA) 100-25 MCG/INH inhaler Inhale 1 puff Daily. 3/27/19  Yes Denisse Marcano MD   furosemide (LASIX) 20 MG tablet Take 40 mg by mouth 2 (two) times a day.   Yes Modesta Chaudhary MD   gemfibrozil (LOPID) 600 MG tablet Take 600 mg by mouth 2 (Two) Times a Day Before Meals.   Yes Modesta Chaudhary MD   glyBURIDE (DIAbeta) 2.5 MG tablet Take 2.5 mg by mouth 2 (Two) Times a Day With Meals.   Yes Modesta Chaudhary MD   HYDROcodone-acetaminophen (NORCO) 7.5-325 MG per tablet Take 1 tablet by mouth 3 (Three) Times a Day.   Yes Modesta Chaudhary MD   Insulin Glargine (BASAGLAR KWIKPEN) 100 UNIT/ML  injection pen Inject 38 Units under the skin into the appropriate area as directed. 38u am  48u pm   Yes Modesta Chaudhary MD   JANUMET XR  MG tablet sustained-release 24 hour Take 1 tablet by mouth 2 (Two) Times a Day. 2/2/17  Yes Modesta Chaudhary MD   montelukast (SINGULAIR) 10 MG tablet Take 1 tablet by mouth Every Night. 2/23/17  Yes Denisse Marcano MD   omeprazole (priLOSEC) 40 MG capsule Take 1 capsule by mouth 2 (Two) Times a Day. 11/11/19  Yes Genaro Franco PA-C   Plecanatide (Trulance) 3 MG tablet Take 1 tablet by mouth Daily. 8/24/20  Yes Kendrick Ruiz MD   potassium chloride (K-DUR,KLOR-CON) 10 MEQ CR tablet Take 30 mEq by mouth 2 (Two) Times a Day.   Yes Modesta Chaudhary MD   pravastatin (PRAVACHOL) 20 MG tablet Take 20 mg by mouth Every Night.   Yes Modesta Chaudhary MD   sennosides-docusate (senna-docusate sodium) 8.6-50 MG per tablet Take 2 tablets by mouth Every Night. 9/1/20  Yes Kendrick Ruiz MD   metoclopramide (REGLAN) 10 MG tablet Take 10 mg by mouth As Needed (nausea).  11/12/20 Yes Modesta Chaudhary MD       Allergies:  Doxycycline and Penicillins    ROS:    Review of Systems   Constitutional: Negative for activity change, appetite change, chills, diaphoresis, fatigue, fever and unexpected weight change.   HENT: Negative for sore throat and trouble swallowing.    Respiratory: Negative for shortness of breath.    Gastrointestinal: Positive for abdominal pain and nausea. Negative for abdominal distention, anal bleeding, blood in stool, constipation, diarrhea, rectal pain and vomiting.   Endocrine: Negative for polydipsia, polyphagia and polyuria.   Genitourinary: Negative for difficulty urinating.   Musculoskeletal: Negative for arthralgias.   Skin: Negative for pallor.   Allergic/Immunologic: Negative for food allergies.   Neurological: Negative for weakness and light-headedness.   Psychiatric/Behavioral: Negative for behavioral problems.     Objective  "    Blood pressure 145/84, pulse 99, temperature 96.9 °F (36.1 °C), temperature source Temporal, resp. rate 18, height 160 cm (63\"), weight 76.7 kg (169 lb), SpO2 96 %, not currently breastfeeding.    Physical Exam  Constitutional:       General: She is not in acute distress.     Appearance: She is well-developed. She is not diaphoretic.   HENT:      Head: Normocephalic and atraumatic.   Cardiovascular:      Rate and Rhythm: Normal rate and regular rhythm.      Heart sounds: Normal heart sounds. No murmur. No friction rub. No gallop.    Pulmonary:      Effort: No respiratory distress.      Breath sounds: Normal breath sounds. No wheezing or rales.   Chest:      Chest wall: No tenderness.   Abdominal:      General: Bowel sounds are normal. There is no distension.      Palpations: Abdomen is soft. There is no mass.      Tenderness: There is no abdominal tenderness. There is no guarding or rebound.      Hernia: No hernia is present.   Musculoskeletal: Normal range of motion.   Skin:     General: Skin is warm and dry.      Coloration: Skin is not pale.      Findings: No erythema or rash.   Neurological:      Mental Status: She is alert and oriented to person, place, and time.   Psychiatric:         Behavior: Behavior normal.         Thought Content: Thought content normal.         Judgment: Judgment normal.          Assessment/Plan   There are no diagnoses linked to this encounter.    ESOPHAGOGASTRODUODENOSCOPY (N/A)    No diagnosis found.    Anticipated Surgical Procedure:  No orders of the defined types were placed in this encounter.      The risks, benefits, and alternatives of this procedure have been discussed with the patient or the responsible party- the patient understands and agrees to proceed.                                                                "

## 2021-01-13 LAB
LAB AP CASE REPORT: NORMAL
PATH REPORT.FINAL DX SPEC: NORMAL

## 2021-01-19 ENCOUNTER — OFFICE VISIT (OUTPATIENT)
Dept: GASTROENTEROLOGY | Facility: CLINIC | Age: 61
End: 2021-01-19

## 2021-01-19 VITALS
HEIGHT: 63 IN | WEIGHT: 173.2 LBS | HEART RATE: 104 BPM | BODY MASS INDEX: 30.69 KG/M2 | SYSTOLIC BLOOD PRESSURE: 120 MMHG | DIASTOLIC BLOOD PRESSURE: 70 MMHG

## 2021-01-19 DIAGNOSIS — R10.84 GENERALIZED ABDOMINAL PAIN: Primary | ICD-10-CM

## 2021-01-19 PROCEDURE — 99214 OFFICE O/P EST MOD 30 MIN: CPT | Performed by: INTERNAL MEDICINE

## 2021-01-19 RX ORDER — LUBIPROSTONE 24 UG/1
CAPSULE ORAL
COMMUNITY
Start: 2020-12-15

## 2021-01-19 RX ORDER — GABAPENTIN 100 MG/1
100 CAPSULE ORAL DAILY
COMMUNITY
Start: 2020-12-07 | End: 2021-12-08

## 2021-01-19 RX ORDER — PROMETHAZINE HYDROCHLORIDE 25 MG/1
25 TABLET ORAL EVERY 6 HOURS PRN
Qty: 60 TABLET | Refills: 3 | Status: SHIPPED | OUTPATIENT
Start: 2021-01-19

## 2021-01-19 RX ORDER — ALBUTEROL SULFATE 2.5 MG/3ML
2.5 SOLUTION RESPIRATORY (INHALATION) EVERY 6 HOURS PRN
COMMUNITY

## 2021-01-19 RX ORDER — BENZONATATE 200 MG/1
CAPSULE ORAL
COMMUNITY
Start: 2021-01-04 | End: 2021-02-04

## 2021-01-19 RX ORDER — GLUCOSAMINE HCL 500 MG
TABLET ORAL
COMMUNITY

## 2021-01-19 RX ORDER — FERROUS SULFATE 325(65) MG
TABLET ORAL
COMMUNITY
Start: 2020-11-23

## 2021-01-19 RX ORDER — BLOOD SUGAR DIAGNOSTIC
STRIP MISCELLANEOUS
COMMUNITY
Start: 2020-11-23

## 2021-01-19 RX ORDER — PEN NEEDLE, DIABETIC 32GX 5/32"
NEEDLE, DISPOSABLE MISCELLANEOUS 3 TIMES DAILY
COMMUNITY
Start: 2020-12-17 | End: 2021-03-18

## 2021-01-19 NOTE — PATIENT INSTRUCTIONS
MyPlate from USDA    MyPlate is an outline of a general healthy diet based on the 2010 Dietary Guidelines for Americans, from the U.[place]. Department of Agriculture (USDA). It sets guidelines for how much food you should eat from each food group based on your age, sex, and level of physical activity.  What are tips for following MyPlate?  To follow MyPlate recommendations:  · Eat a wide variety of fruits and vegetables, grains, and protein foods.  · Serve smaller portions and eat less food throughout the day.  · Limit portion sizes to avoid overeating.  · Enjoy your food.  · Get at least 150 minutes of exercise every week. This is about 30 minutes each day, 5 or more days per week.  It can be difficult to have every meal look like MyPlate. Think about MyPlate as eating guidelines for an entire day, rather than each individual meal.  Fruits and vegetables  · Make half of your plate fruits and vegetables.  · Eat many different colors of fruits and vegetables each day.  · For a 2,000 calorie daily food plan, eat:  ? 2½ cups of vegetables every day.  ? 2 cups of fruit every day.  · 1 cup is equal to:  ? 1 cup raw or cooked vegetables.  ? 1 cup raw fruit.  ? 1 medium-sized orange, apple, or banana.  ? 1 cup 100% fruit or vegetable juice.  ? 2 cups raw leafy greens, such as lettuce, spinach, or kale.  ? ½ cup dried fruit.  Grains  · One fourth of your plate should be grains.  · Make at least half of the grains you eat each day whole grains.  · For a 2,000 calorie daily food plan, eat 6 oz of grains every day.  · 1 oz is equal to:  ? 1 slice bread.  ? 1 cup cereal.  ? ½ cup cooked rice, cereal, or pasta.  Protein  · One fourth of your plate should be protein.  · Eat a wide variety of protein foods, including meat, poultry, fish, eggs, beans, nuts, and tofu.  · For a 2,000 calorie daily food plan, eat 5½ oz of protein every day.  · 1 oz is equal to:  ? 1 oz meat, poultry, or fish.  ? ¼ cup cooked beans.  ? 1 egg.  ? ½ oz nuts  or seeds.  ? 1 Tbsp peanut butter.  Dairy  · Drink fat-free or low-fat (1%) milk.  · Eat or drink dairy as a side to meals.  · For a 2,000 calorie daily food plan, eat or drink 3 cups of dairy every day.  · 1 cup is equal to:  ? 1 cup milk, yogurt, cottage cheese, or soy milk (soy beverage).  ? 2 oz processed cheese.  ? 1½ oz natural cheese.  Fats, oils, salt, and sugars  · Only small amounts of oils are recommended.  · Avoid foods that are high in calories and low in nutritional value (empty calories), like foods high in fat or added sugars.  · Choose foods that are low in salt (sodium). Choose foods that have less than 140 milligrams (mg) of sodium per serving.  · Drink water instead of sugary drinks. Drink enough water each day to keep your urine pale yellow.  Where to find support  · Work with your health care provider or a nutrition specialist (dietitian) to develop a customized eating plan that is right for you.  · Download an she (mobile application) to help you track your daily food intake.  Where to find more information  · Go to ChooseMyPlate.gov for more information.  Summary  · MyPlate is a general guideline for healthy eating from the USDA. It is based on the 2010 Dietary Guidelines for Americans.  · In general, fruits and vegetables should take up ½ of your plate, grains should take up ¼ of your plate, and protein should take up ¼ of your plate.  This information is not intended to replace advice given to you by your health care provider. Make sure you discuss any questions you have with your health care provider.  Document Revised: 05/21/2020 Document Reviewed: 03/19/2018  Elsevier Patient Education © 2020 [employer].

## 2021-01-19 NOTE — PROGRESS NOTES
[facility]      Chief Complaint:   Chief Complaint   Patient presents with   • Follow-up       Subjective     HPI:   Patient with abdominal distention and abdominal pain.  Patient also having nausea.  Patient states that this is getting worse and her weight is going up.  Patient does have markedly distended abdomen at this time.  Patient does have some vascular changes seen on ultrasound but vascular surgery states that these are not severe enough to cause patient's symptoms.    Plan; schedule patient for CT scan of the abdomen and pelvis with p.o. and IV contrast.  Will also have patient undergo lab work including CMP CBC amylase and lipase will patient follow-up in 1 week    Past Medical History:   Past Medical History:   Diagnosis Date   • Alcoholic fatty liver    • Alkaline phosphatase raised    • Anxiety    • Asthma     IgE-MEDIATED ALLERGIC ASTHMA   • Backache     CHRONIC   • CHF (congestive heart failure) (CMS/HCC)    • Chronic hepatitis C (CMS/HCC)     2b. Fibrosure .05/F0, necroinflam .14/A0. Repeat .05/F0, .13/A0      • Chronic neck pain    • Constipation    • COPD (chronic obstructive pulmonary disease) (CMS/HCC)    • Diabetes (CMS/HCC)    • Diarrhea    • Elevated levels of transaminase & lactic acid dehydrogenase    • Emphysema, unspecified (CMS/HCC)    • Generalized abdominal pain    • GERD (gastroesophageal reflux disease)     WITH ESOPHAGITIS   • Hepatitis    • High risk sexual behavior    • Hypertension    • Hypokalemia    • Irritable bowel syndrome (IBS)    • Multiple joint pain    • Need for vaccination    • Numbness and tingling in left arm    • On long term drug therapy    • Shoulder pain    • Tobacco dependence syndrome        Past Surgical History:  Past Surgical History:   Procedure Laterality Date   • ANTERIOR CERVICAL DISCECTOMY W/ FUSION N/A 5/13/2019    Procedure: EXPLORATION OF FUSION C5-6, ANTERIOR CERVICAL DISCECTOMY FUSION C3-5 WITH INSTRUMENTATION C3-6;   Surgeon: YINKA Sanders MD;  Location: [facility] OR;  Service: Orthopedic Spine   • APPENDECTOMY     • CERVICAL FUSION  1999    C5-6   • CHOLECYSTECTOMY     • COLONOSCOPY  02/08/2016   • COLONOSCOPY N/A 8/16/2019    Procedure: COLONOSCOPY;  Surgeon: Kendrick Ruiz MD;  Location: [facility] ENDOSCOPY;  Service: Gastroenterology   • COLONOSCOPY W/ POLYPECTOMY  02/08/2016    External and internal hemorrhoids.The examination was otherwise normal.Fluid aspiration performed.Several biopsies obtained in the entire colon.   • ENDOSCOPY  02/08/2016    Mildly severe esophagitis.Gastritis.Normal examined duodenum.Several biopsies obtained in the lower third of the esophagus.Several biopsies obtained in the gastric antrum.Several biopsies obtained in the first part of the duodenum.   • ENDOSCOPY  08/13/2012    Esophagitis seen. Biopsy taken. Gastritis in stomach. Biopsy taken. Normal duodenum. Biopsy taken.   • ENDOSCOPY N/A 8/16/2019    Procedure: ESOPHAGOGASTRODUODENOSCOPY;  Surgeon: Kendrick Ruiz MD;  Location: [facility] ENDOSCOPY;  Service: Gastroenterology   • ENDOSCOPY N/A 1/11/2021    Procedure: ESOPHAGOGASTRODUODENOSCOPY;  Surgeon: Kendrick Ruiz MD;  Location: [facility] ENDOSCOPY;  Service: Gastroenterology;  Laterality: N/A;   • ENDOSCOPY AND COLONOSCOPY  08/13/2012    Internal & external hemorrhoids found. Scope could not pass into the TI.   • FINGER SURGERY Left 04/2019    thumb - piece of metal removed   • HARDWARE REMOVAL N/A 5/13/2019    Procedure: REMOVAL OF INSTRUMENTATION;  Surgeon: YINKA Sanders MD;  Location: [facility] OR;  Service: Orthopedic Spine   • HEMORRHOIDECTOMY     • HERNIA REPAIR      pt states no hernia was found so no procedure was done so they closed her back up   • INJECTION OF MEDICATION  08/01/2013    METHYLPREDNISONE, X2   • INJECTION OF MEDICATION  11/03/2011    KENALOG   • INJECTION OF MEDICATION  02/24/2011    ROCEPHIN   • KNEE SURGERY Right    • TONSILLECTOMY AND ADENOIDECTOMY     •  TOTAL ABDOMINAL HYSTERECTOMY WITH SALPINGO OOPHORECTOMY     • UPPER GASTROINTESTINAL ENDOSCOPY  02/08/2016   • UPPER GASTROINTESTINAL ENDOSCOPY  08/16/2019       Family History:  Family History   Problem Relation Age of Onset   • Coronary artery disease Mother    • Diabetes Mother    • Heart failure Mother    • Cancer Father    • Diabetes Father    • Heart failure Father    • Thyroid disease Father    • Breast cancer Sister    • Cancer Other         COLORECTAL   • Endometrial cancer Other    • Ovarian cancer Other    • Diabetes Brother        Social History:   reports that she has been smoking cigarettes. She has a 22.50 pack-year smoking history. She has never used smokeless tobacco. She reports current drug use. Drug: Marijuana. She reports that she does not drink alcohol.    Medications:   Prior to Admission medications    Medication Sig Start Date End Date Taking? Authorizing Provider   amitriptyline (ELAVIL) 100 MG tablet Take 200 mg by mouth Every Night.   Yes Modesta Chaudhary MD   amLODIPine (NORVASC) 5 MG tablet Take 5 mg by mouth Daily.   Yes Modesta Chaudhary MD   benzonatate (TESSALON) 200 MG capsule TAKE 1 CAPSULE BY MOUTH 3 TIMES DAILY AS NEEDED 1/4/21 2/4/21 Yes Modesta Chaudhary MD   Calcium Carb-Cholecalciferol 600-100 MG-UNIT capsule Take  by mouth.   Yes Modesta Chaudhary MD   cetirizine (zyrTEC) 10 MG tablet Take 10 mg by mouth Daily.   Yes Modesta Chaudhary MD   Cholecalciferol (Vitamin D3) 75 MCG (3000 UT) tablet Take  by mouth.   Yes Modesta Chaudhary MD   Empagliflozin (Jardiance) 25 MG tablet Take 25 mg by mouth Daily.   Yes Modesta Chaudhary MD   ferrous sulfate (FeroSul) 325 (65 FE) MG tablet  11/23/20  Yes Modesta Chaudhary MD   fluticasone (FLONASE) 50 MCG/ACT nasal spray 1 spray into each nostril Daily. 2/13/17  Yes Modesta Chaudhary MD   Fluticasone Furoate-Vilanterol (BREO ELLIPTA) 100-25 MCG/INH inhaler Inhale 1 puff Daily. 3/27/19  Yes Jeet  Denisse ESCUDERO MD   furosemide (LASIX) 20 MG tablet Take 40 mg by mouth 2 (two) times a day.   Yes Modesta Chaudhary MD   gabapentin (NEURONTIN) 100 MG capsule Take 100 mg by mouth Daily. 12/7/20 12/8/21 Yes Modesta Chaudhary MD   gemfibrozil (LOPID) 600 MG tablet Take 600 mg by mouth 2 (Two) Times a Day Before Meals.   Yes Modesta Chaudhary MD   glyBURIDE (DIAbeta) 2.5 MG tablet Take 2.5 mg by mouth 2 (Two) Times a Day With Meals.   Yes Modesta Chaudhary MD   HYDROcodone-acetaminophen (NORCO) 7.5-325 MG per tablet Take 1 tablet by mouth 3 (Three) Times a Day.   Yes Modesta Chaudhary MD   Insulin Glargine (BASAGLAR KWIKPEN) 100 UNIT/ML injection pen Inject 38 Units under the skin into the appropriate area as directed. 38u am  48u pm   Yes Modesta Chaudhary MD   Insulin Pen Needle (BD Pen Needle Fariba U/F) 32G X 4 MM misc 3 (Three) Times a Day. 12/17/20 3/18/21 Yes Modesta Chaudhary MD   JANUMET XR  MG tablet sustained-release 24 hour Take 1 tablet by mouth 2 (Two) Times a Day. 2/2/17  Yes Modesta Chaudhary MD   lubiprostone (Amitiza) 24 MCG capsule  12/15/20  Yes Modesta Chaudhary MD   montelukast (SINGULAIR) 10 MG tablet Take 1 tablet by mouth Every Night. 2/23/17  Yes Denisse Marcano MD   omeprazole (priLOSEC) 40 MG capsule Take 1 capsule by mouth 2 (Two) Times a Day. 11/11/19  Yes Genaro Franco PA-C   OneTouch Ultra test strip  11/23/20  Yes Modesta Chaudhary MD   potassium chloride (K-DUR,KLOR-CON) 10 MEQ CR tablet Take 30 mEq by mouth 2 (Two) Times a Day.   Yes Modesta Chaudhary MD   pravastatin (PRAVACHOL) 20 MG tablet Take 20 mg by mouth Every Night.   Yes Modesta Chaudhary MD   senna (SENOKOT) 8.6 MG tablet Take 1 tablet by mouth Every Night.   Yes Modesta Chaudhary MD   sennosides-docusate (senna-docusate sodium) 8.6-50 MG per tablet Take 2 tablets by mouth Every Night. 9/1/20  Yes Kendrick Ruiz MD   albuterol (PROVENTIL) (2.5 MG/3ML) 0.083%  "nebulizer solution 2.5 mg Every 6 (Six) Hours As Needed.    ProviderModesta MD   Blood Glucose Monitoring Suppl w/Device kit 1 Device Daily. 1/27/20 1/27/21  Modesta Chaudhary MD   fluconazole (DIFLUCAN) 100 MG tablet Take 100 mg by mouth 1 (One) Time.    Modesta Chaudhary MD   promethazine (PHENERGAN) 25 MG tablet Take 1 tablet by mouth Every 6 (Six) Hours As Needed for Nausea or Vomiting. 1/19/21   Kendrick Ruiz MD       Allergies:  Doxycycline, Penicillins, and Semaglutide    ROS:    Review of Systems   Constitutional: Negative for activity change, appetite change, chills, diaphoresis, fatigue, fever and unexpected weight change.   HENT: Negative for sore throat and trouble swallowing.    Respiratory: Negative for shortness of breath.    Gastrointestinal: Positive for abdominal distention and abdominal pain. Negative for anal bleeding, blood in stool, constipation, diarrhea, nausea, rectal pain and vomiting.   Endocrine: Negative for polydipsia, polyphagia and polyuria.   Genitourinary: Negative for difficulty urinating.   Musculoskeletal: Negative for arthralgias.   Skin: Negative for pallor.   Allergic/Immunologic: Negative for food allergies.   Neurological: Negative for weakness and light-headedness.   Psychiatric/Behavioral: Negative for behavioral problems.     Objective     Blood pressure 120/70, pulse 104, height 160 cm (63\"), weight 78.6 kg (173 lb 3.2 oz), not currently breastfeeding.    Physical Exam  Constitutional:       General: She is not in acute distress.     Appearance: She is well-developed. She is not diaphoretic.   HENT:      Head: Normocephalic and atraumatic.   Cardiovascular:      Rate and Rhythm: Normal rate and regular rhythm.      Heart sounds: Normal heart sounds. No murmur. No friction rub. No gallop.    Pulmonary:      Effort: No respiratory distress.      Breath sounds: Normal breath sounds. No wheezing or rales.   Chest:      Chest wall: No tenderness.   Abdominal: "      General: Bowel sounds are normal. There is no distension.      Palpations: Abdomen is soft. There is no mass.      Tenderness: There is no abdominal tenderness. There is no guarding or rebound.      Hernia: No hernia is present.   Musculoskeletal: Normal range of motion.   Skin:     General: Skin is warm and dry.      Coloration: Skin is not pale.      Findings: No erythema or rash.   Neurological:      Mental Status: She is alert and oriented to person, place, and time.   Psychiatric:         Behavior: Behavior normal.         Thought Content: Thought content normal.         Judgment: Judgment normal.          Assessment/Plan   Diagnoses and all orders for this visit:    1. Generalized abdominal pain (Primary)  -     CT Abdomen Pelvis With Contrast; Future  -     Comprehensive Metabolic Panel; Future  -     CBC & Differential; Future  -     Amylase; Future  -     Lipase; Future    Other orders  -     promethazine (PHENERGAN) 25 MG tablet; Take 1 tablet by mouth Every 6 (Six) Hours As Needed for Nausea or Vomiting.  Dispense: 60 tablet; Refill: 3        * Surgery not found *     Diagnosis Plan   1. Generalized abdominal pain  CT Abdomen Pelvis With Contrast    Comprehensive Metabolic Panel    CBC & Differential    Amylase    Lipase       Anticipated Surgical Procedure:  Orders Placed This Encounter   Procedures   • CT Abdomen Pelvis With Contrast     Standing Status:   Future     Standing Expiration Date:   1/19/2022     Order Specific Question:   Will Oral Contrast be needed for this procedure?     Answer:   Yes   • Comprehensive Metabolic Panel     Standing Status:   Future   • Amylase     Standing Status:   Future   • Lipase     Standing Status:   Future   • CBC & Differential     Standing Status:   Future     Order Specific Question:   Manual Differential     Answer:   No       The risks, benefits, and alternatives of this procedure have been discussed with the patient or the responsible party- the patient  understands and agrees to proceed.

## 2021-01-21 ENCOUNTER — LAB (OUTPATIENT)
Dept: LAB | Facility: HOSPITAL | Age: 61
End: 2021-01-21

## 2021-01-21 ENCOUNTER — HOSPITAL ENCOUNTER (OUTPATIENT)
Dept: CT IMAGING | Facility: HOSPITAL | Age: 61
Discharge: HOME | End: 2021-01-21

## 2021-01-21 DIAGNOSIS — R10.84 GENERALIZED ABDOMINAL PAIN: ICD-10-CM

## 2021-01-21 PROCEDURE — 0 IOPAMIDOL PER 1 ML: Performed by: INTERNAL MEDICINE

## 2021-01-21 PROCEDURE — 74177 CT ABD & PELVIS W/CONTRAST: CPT

## 2021-01-21 RX ADMIN — IOPAMIDOL 90 ML: 755 INJECTION, SOLUTION INTRAVENOUS at 11:28

## 2021-01-26 ENCOUNTER — OFFICE VISIT (OUTPATIENT)
Dept: GASTROENTEROLOGY | Facility: CLINIC | Age: 61
End: 2021-01-26

## 2021-01-26 DIAGNOSIS — R10.13 EPIGASTRIC PAIN: Primary | ICD-10-CM

## 2021-01-26 PROCEDURE — 99442 PR PHYS/QHP TELEPHONE EVALUATION 11-20 MIN: CPT | Performed by: INTERNAL MEDICINE

## 2021-01-26 RX ORDER — SUCRALFATE 1 G/1
1 TABLET ORAL 4 TIMES DAILY
Qty: 120 TABLET | Refills: 5 | Status: SHIPPED | OUTPATIENT
Start: 2021-01-26 | End: 2021-07-14 | Stop reason: SDUPTHER

## 2021-01-26 NOTE — PROGRESS NOTES
[facility]      Chief Complaint:   Chief Complaint   Patient presents with   • Abdominal Pain   This visit has been rescheduled as a phone visit to comply with patient safety concerns in accordance with CDC recommendations. Total time of discussion was 20 minutes.  You have chosen to receive care through a telephone visit. Do you consent to use a telephone visit for your medical care today? Yes    Subjective     HPI:   Patient with epigastric abdominal pain.  Patient states that she continues to have severe pain despite taking medication CAT scan of the abdomen did not show any acute process lab work does show a mildly elevated alkaline phosphatase but otherwise normal.  Patient is taking a proton pump inhibitor for her hiatal hernia.    Plan; we will have patient follow-up in 1 month we will start patient on Carafate 1 g 4 times daily see if any improvement in symptoms if no improvement in symptoms will consider surgical evaluation for hiatal hernia.    Past Medical History:   Past Medical History:   Diagnosis Date   • Alcoholic fatty liver    • Alkaline phosphatase raised    • Anxiety    • Asthma     IgE-MEDIATED ALLERGIC ASTHMA   • Backache     CHRONIC   • CHF (congestive heart failure) (CMS/HCC)    • Chronic hepatitis C (CMS/HCC)     2b. Fibrosure .05/F0, necroinflam .14/A0. Repeat .05/F0, .13/A0      • Chronic neck pain    • Constipation    • COPD (chronic obstructive pulmonary disease) (CMS/HCC)    • Diabetes (CMS/HCC)    • Diarrhea    • Elevated levels of transaminase & lactic acid dehydrogenase    • Emphysema, unspecified (CMS/HCC)    • Generalized abdominal pain    • GERD (gastroesophageal reflux disease)     WITH ESOPHAGITIS   • Hepatitis    • High risk sexual behavior    • Hypertension    • Hypokalemia    • Irritable bowel syndrome (IBS)    • Multiple joint pain    • Need for vaccination    • Numbness and tingling in left arm    • On long term drug therapy    • Shoulder pain    •  Tobacco dependence syndrome        Past Surgical History:    Past Surgical History:   Procedure Laterality Date   • ANTERIOR CERVICAL DISCECTOMY W/ FUSION N/A 5/13/2019    Procedure: EXPLORATION OF FUSION C5-6, ANTERIOR CERVICAL DISCECTOMY FUSION C3-5 WITH INSTRUMENTATION C3-6;  Surgeon: YINKA Sanders MD;  Location: [facility] OR;  Service: Orthopedic Spine   • APPENDECTOMY     • CERVICAL FUSION  1999    C5-6   • CHOLECYSTECTOMY     • COLONOSCOPY  02/08/2016   • COLONOSCOPY N/A 8/16/2019    Procedure: COLONOSCOPY;  Surgeon: Kendrick Ruiz MD;  Location: [facility] ENDOSCOPY;  Service: Gastroenterology   • COLONOSCOPY W/ POLYPECTOMY  02/08/2016    External and internal hemorrhoids.The examination was otherwise normal.Fluid aspiration performed.Several biopsies obtained in the entire colon.   • ENDOSCOPY  02/08/2016    Mildly severe esophagitis.Gastritis.Normal examined duodenum.Several biopsies obtained in the lower third of the esophagus.Several biopsies obtained in the gastric antrum.Several biopsies obtained in the first part of the duodenum.   • ENDOSCOPY  08/13/2012    Esophagitis seen. Biopsy taken. Gastritis in stomach. Biopsy taken. Normal duodenum. Biopsy taken.   • ENDOSCOPY N/A 8/16/2019    Procedure: ESOPHAGOGASTRODUODENOSCOPY;  Surgeon: Kendrick Ruiz MD;  Location: [facility] ENDOSCOPY;  Service: Gastroenterology   • ENDOSCOPY N/A 1/11/2021    Procedure: ESOPHAGOGASTRODUODENOSCOPY;  Surgeon: Kendrick Ruiz MD;  Location: [facility] ENDOSCOPY;  Service: Gastroenterology;  Laterality: N/A;   • ENDOSCOPY AND COLONOSCOPY  08/13/2012    Internal & external hemorrhoids found. Scope could not pass into the TI.   • FINGER SURGERY Left 04/2019    thumb - piece of metal removed   • HARDWARE REMOVAL N/A 5/13/2019    Procedure: REMOVAL OF INSTRUMENTATION;  Surgeon: YINKA Sanders MD;  Location: [facility] OR;  Service: Orthopedic Spine   • HEMORRHOIDECTOMY     • HERNIA REPAIR      pt states no hernia was found so no  procedure was done so they closed her back up   • INJECTION OF MEDICATION  08/01/2013    METHYLPREDNISONE, X2   • INJECTION OF MEDICATION  11/03/2011    KENALOG   • INJECTION OF MEDICATION  02/24/2011    ROCEPHIN   • KNEE SURGERY Right    • TONSILLECTOMY AND ADENOIDECTOMY     • TOTAL ABDOMINAL HYSTERECTOMY WITH SALPINGO OOPHORECTOMY     • UPPER GASTROINTESTINAL ENDOSCOPY  02/08/2016   • UPPER GASTROINTESTINAL ENDOSCOPY  08/16/2019       Family History:  Family History   Problem Relation Age of Onset   • Coronary artery disease Mother    • Diabetes Mother    • Heart failure Mother    • Cancer Father    • Diabetes Father    • Heart failure Father    • Thyroid disease Father    • Breast cancer Sister    • Cancer Other         COLORECTAL   • Endometrial cancer Other    • Ovarian cancer Other    • Diabetes Brother        Social History:   reports that she has been smoking cigarettes. She has a 22.50 pack-year smoking history. She has never used smokeless tobacco. She reports current drug use. Drug: Marijuana. She reports that she does not drink alcohol.    Medications:   Prior to Admission medications    Medication Sig Start Date End Date Taking? Authorizing Provider   albuterol (PROVENTIL) (2.5 MG/3ML) 0.083% nebulizer solution 2.5 mg Every 6 (Six) Hours As Needed.   Yes Modesta Chaudhary MD   amitriptyline (ELAVIL) 100 MG tablet Take 200 mg by mouth Every Night.   Yes Modesta Chaudhary MD   amLODIPine (NORVASC) 5 MG tablet Take 5 mg by mouth Daily.   Yes Modesta Chaudhary MD   benzonatate (TESSALON) 200 MG capsule TAKE 1 CAPSULE BY MOUTH 3 TIMES DAILY AS NEEDED 1/4/21 2/4/21 Yes Modesta Chaudhary MD   Blood Glucose Monitoring Suppl w/Device kit 1 Device Daily. 1/27/20 1/27/21 Yes Modesta Chaudhary MD   Calcium Carb-Cholecalciferol 600-100 MG-UNIT capsule Take  by mouth.   Yes Modesta Chaudhary MD   cetirizine (zyrTEC) 10 MG tablet Take 10 mg by mouth Daily.   Yes Modesta Chaudhary MD    Cholecalciferol (Vitamin D3) 75 MCG (3000 UT) tablet Take  by mouth.   Yes Modesta Chaudhary MD   Empagliflozin (Jardiance) 25 MG tablet Take 25 mg by mouth Daily.   Yes Modesta Chaudhary MD   ferrous sulfate (FeroSul) 325 (65 FE) MG tablet  11/23/20  Yes Modesta Chaudhary MD   fluconazole (DIFLUCAN) 100 MG tablet Take 100 mg by mouth 1 (One) Time.   Yes Modesta Chaudhary MD   fluticasone (FLONASE) 50 MCG/ACT nasal spray 1 spray into each nostril Daily. 2/13/17  Yes Modesta Chaudhary MD   Fluticasone Furoate-Vilanterol (BREO ELLIPTA) 100-25 MCG/INH inhaler Inhale 1 puff Daily. 3/27/19  Yes Denisse Marcano MD   furosemide (LASIX) 20 MG tablet Take 40 mg by mouth 2 (two) times a day.   Yes Modesta Chaudhary MD   gabapentin (NEURONTIN) 100 MG capsule Take 100 mg by mouth Daily. 12/7/20 12/8/21 Yes Modesta Chaudhary MD   gemfibrozil (LOPID) 600 MG tablet Take 600 mg by mouth 2 (Two) Times a Day Before Meals.   Yes Modesta Chaudhary MD   glyBURIDE (DIAbeta) 2.5 MG tablet Take 2.5 mg by mouth 2 (Two) Times a Day With Meals.   Yes Modesta Chaudhary MD   HYDROcodone-acetaminophen (NORCO) 7.5-325 MG per tablet Take 1 tablet by mouth 3 (Three) Times a Day.   Yes Modesta Chaudhary MD   Insulin Glargine (BASAGLAR KWIKPEN) 100 UNIT/ML injection pen Inject 38 Units under the skin into the appropriate area as directed. 38u am  48u pm   Yes Modesta Chaudhary MD   Insulin Pen Needle (BD Pen Needle Fariba U/F) 32G X 4 MM misc 3 (Three) Times a Day. 12/17/20 3/18/21 Yes Modesta Chaudhary MD   JANUMET XR  MG tablet sustained-release 24 hour Take 1 tablet by mouth 2 (Two) Times a Day. 2/2/17  Yes Modesta Chaudhary MD   lubiprostone (Amitiza) 24 MCG capsule  12/15/20  Yes Modesta Chaudhary MD   montelukast (SINGULAIR) 10 MG tablet Take 1 tablet by mouth Every Night. 2/23/17  Yes Denisse Marcano MD   omeprazole (priLOSEC) 40 MG capsule Take 1 capsule by mouth 2 (Two) Times  a Day. 11/11/19  Yes Genaro Franco PA-C   OneTouch Ultra test strip  11/23/20  Yes Modesta Chaudhary MD   potassium chloride (K-DUR,KLOR-CON) 10 MEQ CR tablet Take 30 mEq by mouth 2 (Two) Times a Day.   Yes Modesta Chaudhary MD   pravastatin (PRAVACHOL) 20 MG tablet Take 20 mg by mouth Every Night.   Yes Modesta Chaudhary MD   promethazine (PHENERGAN) 25 MG tablet Take 1 tablet by mouth Every 6 (Six) Hours As Needed for Nausea or Vomiting. 1/19/21  Yes Kendrick Ruiz MD   senna (SENOKOT) 8.6 MG tablet Take 1 tablet by mouth Every Night.   Yes Modesta Chaudhary MD   sennosides-docusate (senna-docusate sodium) 8.6-50 MG per tablet Take 2 tablets by mouth Every Night. 9/1/20  Yes Kendrick Ruiz MD   sucralfate (CARAFATE) 1 g tablet Take 1 tablet by mouth 4 (Four) Times a Day. 1/26/21   Kendrick Ruiz MD       Allergies:  Doxycycline, Penicillins, and Semaglutide    ROS:    Review of Systems   Constitutional: Negative for activity change, appetite change, chills, diaphoresis, fatigue, fever and unexpected weight change.   HENT: Negative for sore throat and trouble swallowing.    Respiratory: Negative for shortness of breath.    Gastrointestinal: Positive for abdominal pain. Negative for abdominal distention, anal bleeding, blood in stool, constipation, diarrhea, nausea, rectal pain and vomiting.   Endocrine: Negative for polydipsia, polyphagia and polyuria.   Genitourinary: Negative for difficulty urinating.   Musculoskeletal: Negative for arthralgias.   Skin: Negative for pallor.   Allergic/Immunologic: Negative for food allergies.   Neurological: Negative for weakness and light-headedness.   Psychiatric/Behavioral: Negative for behavioral problems.     Objective     not currently breastfeeding.    Physical Exam     Assessment/Plan   Diagnoses and all orders for this visit:    1. Epigastric pain (Primary)    Other orders  -     sucralfate (CARAFATE) 1 g tablet; Take 1 tablet by mouth 4 (Four)  Times a Day.  Dispense: 120 tablet; Refill: 5        * Surgery not found *     Diagnosis Plan   1. Epigastric pain         Anticipated Surgical Procedure:  No orders of the defined types were placed in this encounter.      The risks, benefits, and alternatives of this procedure have been discussed with the patient or the responsible party- the patient understands and agrees to proceed.

## 2021-01-26 NOTE — PATIENT INSTRUCTIONS
MyPlate from USDA    MyPlate is an outline of a general healthy diet based on the 2010 Dietary Guidelines for Americans, from the U.S. Department of Agriculture (USDA). It sets guidelines for how much food you should eat from each food group based on your age, sex, and level of physical activity.  What are tips for following MyPlate?  To follow MyPlate recommendations:  · Eat a wide variety of fruits and vegetables, grains, and protein foods.  · Serve smaller portions and eat less food throughout the day.  · Limit portion sizes to avoid overeating.  · Enjoy your food.  · Get at least 150 minutes of exercise every week. This is about 30 minutes each day, 5 or more days per week.  It can be difficult to have every meal look like MyPlate. Think about MyPlate as eating guidelines for an entire day, rather than each individual meal.  Fruits and vegetables  · Make half of your plate fruits and vegetables.  · Eat many different colors of fruits and vegetables each day.  · For a 2,000 calorie daily food plan, eat:  ? 2½ cups of vegetables every day.  ? 2 cups of fruit every day.  · 1 cup is equal to:  ? 1 cup raw or cooked vegetables.  ? 1 cup raw fruit.  ? 1 medium-sized orange, apple, or banana.  ? 1 cup 100% fruit or vegetable juice.  ? 2 cups raw leafy greens, such as lettuce, spinach, or kale.  ? ½ cup dried fruit.  Grains  · One fourth of your plate should be grains.  · Make at least half of the grains you eat each day whole grains.  · For a 2,000 calorie daily food plan, eat 6 oz of grains every day.  · 1 oz is equal to:  ? 1 slice bread.  ? 1 cup cereal.  ? ½ cup cooked rice, cereal, or pasta.  Protein  · One fourth of your plate should be protein.  · Eat a wide variety of protein foods, including meat, poultry, fish, eggs, beans, nuts, and tofu.  · For a 2,000 calorie daily food plan, eat 5½ oz of protein every day.  · 1 oz is equal to:  ? 1 oz meat, poultry, or fish.  ? ¼ cup cooked beans.  ? 1 egg.  ? ½ oz nuts  or seeds.  ? 1 Tbsp peanut butter.  Dairy  · Drink fat-free or low-fat (1%) milk.  · Eat or drink dairy as a side to meals.  · For a 2,000 calorie daily food plan, eat or drink 3 cups of dairy every day.  · 1 cup is equal to:  ? 1 cup milk, yogurt, cottage cheese, or soy milk (soy beverage).  ? 2 oz processed cheese.  ? 1½ oz natural cheese.  Fats, oils, salt, and sugars  · Only small amounts of oils are recommended.  · Avoid foods that are high in calories and low in nutritional value (empty calories), like foods high in fat or added sugars.  · Choose foods that are low in salt (sodium). Choose foods that have less than 140 milligrams (mg) of sodium per serving.  · Drink water instead of sugary drinks. Drink enough water each day to keep your urine pale yellow.  Where to find support  · Work with your health care provider or a nutrition specialist (dietitian) to develop a customized eating plan that is right for you.  · Download an she (mobile application) to help you track your daily food intake.  Where to find more information  · Go to ChooseMyPlate.gov for more information.  Summary  · MyPlate is a general guideline for healthy eating from the USDA. It is based on the 2010 Dietary Guidelines for Americans.  · In general, fruits and vegetables should take up ½ of your plate, grains should take up ¼ of your plate, and protein should take up ¼ of your plate.  This information is not intended to replace advice given to you by your health care provider. Make sure you discuss any questions you have with your health care provider.  Document Revised: 05/21/2020 Document Reviewed: 03/19/2018  Elsevier Patient Education © 2020 Elsevier Inc.  BMI for Adults  What is BMI?  Body mass index (BMI) is a number that is calculated from a person's weight and height. BMI can help estimate how much of a person's weight is composed of fat. BMI does not measure body fat directly. Rather, it is an alternative to procedures that  "directly measure body fat, which can be difficult and expensive.  BMI can help identify people who may be at higher risk for certain medical problems.  What are BMI measurements used for?  BMI is used as a screening tool to identify possible weight problems. It helps determine whether a person is obese, overweight, a healthy weight, or underweight.  BMI is useful for:  · Identifying a weight problem that may be related to a medical condition or may increase the risk for medical problems.  · Promoting changes, such as changes in diet and exercise, to help reach a healthy weight. BMI screening can be repeated to see if these changes are working.  How is BMI calculated?  BMI involves measuring your weight in relation to your height. Both height and weight are measured, and the BMI is calculated from those numbers. This can be done either in English (U[place] or metric measurements. Note that charts and online BMI calculators are available to help you find your BMI quickly and easily without having to do these calculations yourself.  To calculate your BMI in English (U.S[place] measurements:    1. Measure your weight in pounds (lb).  2. Multiply the number of pounds by 703.  ? For example, for a person who weighs 180 lb, multiply that number by 703, which equals 126,540.  3. Measure your height in inches. Then multiply that number by itself to get a measurement called \"inches squared.\"  ? For example, for a person who is 70 inches tall, the \"inches squared\" measurement is 70 inches x 70 inches, which equals 4,900 inches squared.  4. Divide the total from step 2 (number of lb x 703) by the total from step 3 (inches squared): 126,540 ÷ 4,900 = 25.8. This is your BMI.  To calculate your BMI in metric measurements:  1. Measure your weight in kilograms (kg).  2. Measure your height in meters (m). Then multiply that number by itself to get a measurement called \"meters squared.\"  ? For example, for a person who is 1.75 m tall, the " "\"meters squared\" measurement is 1.75 m x 1.75 m, which is equal to 3.1 meters squared.  3. Divide the number of kilograms (your weight) by the meters squared number. In this example: 70 ÷ 3.1 = 22.6. This is your BMI.  What do the results mean?  BMI charts are used to identify whether you are underweight, normal weight, overweight, or obese. The following guidelines will be used:  · Underweight: BMI less than 18.5.  · Normal weight: BMI between 18.5 and 24.9.  · Overweight: BMI between 25 and 29.9.  · Obese: BMI of 30 or above.  Keep these notes in mind:  · Weight includes both fat and muscle, so someone with a muscular build, such as an athlete, may have a BMI that is higher than 24.9. In cases like these, BMI is not an accurate measure of body fat.  · To determine if excess body fat is the cause of a BMI of 25 or higher, further assessments may need to be done by a health care provider.  · BMI is usually interpreted in the same way for men and women.  Where to find more information  For more information about BMI, including tools to quickly calculate your BMI, go to these websites:  · Centers for Disease Control and Prevention: www.cdc.gov  · American Heart Association: www.heart.org  · National Heart, Lung, and Blood [place]: www.nhlbi.nih.gov  Summary  · Body mass index (BMI) is a number that is calculated from a person's weight and height.  · BMI may help estimate how much of a person's weight is composed of fat. BMI can help identify those who may be at higher risk for certain medical problems.  · BMI can be measured using English measurements or metric measurements.  · BMI charts are used to identify whether you are underweight, normal weight, overweight, or obese.  This information is not intended to replace advice given to you by your health care provider. Make sure you discuss any questions you have with your health care provider.  Document Revised: 09/09/2020 Document Reviewed: 07/17/2020  [employer]itin " Patient Education © 2020 Elsevier Inc.

## 2021-03-12 RX ORDER — PLECANATIDE 3 MG/1
1 TABLET ORAL DAILY
Qty: 30 TABLET | Refills: 5 | Status: SHIPPED | OUTPATIENT
Start: 2021-03-12

## 2021-03-12 NOTE — TELEPHONE ENCOUNTER
03/12/2021, 0921 - Patient telephoned per this staff member [phone].  Zero answer.  Zero option to submit voice message.    03/12/2021, 0922 - Patient's son, Teddy Webber, telephoned per this staff member [phone].  Zero answer.  Voice message submitted with request to relay message to patient she is in need of contacting office of Dr. Kendrick Pretty.    Rationale for speaking with patient - Notification Trulance 3 MG Tablets has received Prior Authorization approval effective 03/04/2021 - 09/04/2021.  Prescription medication submitted to [facility].

## 2021-03-12 NOTE — TELEPHONE ENCOUNTER
03/12/20212, 1120 - Patient telephone call returned per this staff member [phone] with notification Trulance 3 MG Tablets has received Prior Authorization approval effective 03/04/2021 - 09/04/2021 and [facility]  Patient stated she does not utilize prescription medication and has transferred Gastroenterology care to alternate Gastroenterologist.

## 2021-07-16 RX ORDER — SUCRALFATE 1 G/1
1 TABLET ORAL 4 TIMES DAILY
Qty: 120 TABLET | Refills: 5 | Status: SHIPPED | OUTPATIENT
Start: 2021-07-16

## 2021-07-21 ENCOUNTER — TELEPHONE (OUTPATIENT)
Dept: GASTROENTEROLOGY | Facility: CLINIC | Age: 61
End: 2021-07-21

## 2021-07-21 NOTE — TELEPHONE ENCOUNTER
07/21/2021, 1534 - Patient telephoned per this staff member [phone] with notification facsimile received per [facility] requesting prescription medication renewals for Sucralfate 1 GM Tablets.  Patient made aware of need to schedule clinical appointment with Dr. Kendrick Pretty M.D. as prior clinical appointment was on 01/26/2021 and patient noted to have cancelled clinical appointment on 02/16/2021 and 02/26/2021.  Patient stated she is currently under the care of an alternate Gastroenterologist.    Note - Patient made aware Sucralfate 1 GM Tablets, 1 tablet by mouth 4 times per day, #120, 5 renewals submitted to [facility].

## (undated) DEVICE — RESERVOIR,SUCTION,100CC,SILICONE: Brand: MEDLINE

## (undated) DEVICE — PK SPINE CERV ANT 30

## (undated) DEVICE — GLV SURG SENSICARE W/ALOE PF LF 7.5 STRL

## (undated) DEVICE — PK TURNOVER RM ADV

## (undated) DEVICE — APPL CHLORAPREP W/TINT 26ML ORNG

## (undated) DEVICE — CANN SMPL SOFTECH BIFLO ETCO2 A/M 7FT

## (undated) DEVICE — 3.0MM PRECISION NEURO (MATCH HEAD)

## (undated) DEVICE — GLV SURG SENSICARE W/ALOE PF LF 8 STRL

## (undated) DEVICE — SPNG DISSCT CHRRY 3/8IN STRL PK/5

## (undated) DEVICE — GLV SURG BIOGEL LTX PF 7 1/2

## (undated) DEVICE — TOTAL TRAY, 16FR 10ML SIL FOLEY, URN: Brand: MEDLINE

## (undated) DEVICE — GLV SURG TRIUMPH GREEN W/ALOE PF LTX 8 STRL

## (undated) DEVICE — PACK,UNIVERSAL,NO GOWNS: Brand: MEDLINE

## (undated) DEVICE — GLV SURG BIOGEL LTX PF 6 1/2

## (undated) DEVICE — PIN DISTRACT TI 14MM STRL

## (undated) DEVICE — ANTIBACTERIAL UNDYED BRAIDED (POLYGLACTIN 910), SYNTHETIC ABSORBABLE SUTURE: Brand: COATED VICRYL

## (undated) DEVICE — HALTR TRACT HD CERV STD UNIV

## (undated) DEVICE — SINGLE-USE BIOPSY FORCEPS: Brand: RADIAL JAW 4

## (undated) DEVICE — GLV SURG TRIUMPH GREEN W/ALOE PF LTX 7 STRL

## (undated) DEVICE — DRAPE,UTILITY,TAPE,15X26,STERILE: Brand: MEDLINE

## (undated) DEVICE — Device

## (undated) DEVICE — ELECTRD NDL EDGE/INSUL/PFTE.787MM 2.84IN

## (undated) DEVICE — TP SILK DURAPORE 3IN

## (undated) DEVICE — BITEBLOCK ENDO W/STRAP 60F A/ LF DISP

## (undated) DEVICE — 4-PORT MANIFOLD: Brand: NEPTUNE 2